# Patient Record
Sex: FEMALE | Race: WHITE | NOT HISPANIC OR LATINO
[De-identification: names, ages, dates, MRNs, and addresses within clinical notes are randomized per-mention and may not be internally consistent; named-entity substitution may affect disease eponyms.]

---

## 2017-07-07 PROBLEM — Z00.00 ENCOUNTER FOR PREVENTIVE HEALTH EXAMINATION: Status: ACTIVE | Noted: 2017-07-07

## 2017-07-10 ENCOUNTER — RX RENEWAL (OUTPATIENT)
Age: 67
End: 2017-07-10

## 2017-10-16 ENCOUNTER — RX RENEWAL (OUTPATIENT)
Age: 67
End: 2017-10-16

## 2017-10-18 ENCOUNTER — RX RENEWAL (OUTPATIENT)
Age: 67
End: 2017-10-18

## 2017-12-04 ENCOUNTER — APPOINTMENT (OUTPATIENT)
Dept: GASTROENTEROLOGY | Facility: CLINIC | Age: 67
End: 2017-12-04
Payer: MEDICARE

## 2017-12-04 VITALS
WEIGHT: 266 LBS | SYSTOLIC BLOOD PRESSURE: 110 MMHG | BODY MASS INDEX: 40.32 KG/M2 | DIASTOLIC BLOOD PRESSURE: 70 MMHG | HEIGHT: 68 IN | TEMPERATURE: 98.2 F

## 2017-12-04 DIAGNOSIS — Z86.79 PERSONAL HISTORY OF OTHER DISEASES OF THE CIRCULATORY SYSTEM: ICD-10-CM

## 2017-12-04 DIAGNOSIS — Z80.8 FAMILY HISTORY OF MALIGNANT NEOPLASM OF OTHER ORGANS OR SYSTEMS: ICD-10-CM

## 2017-12-04 DIAGNOSIS — Z82.49 FAMILY HISTORY OF ISCHEMIC HEART DISEASE AND OTHER DISEASES OF THE CIRCULATORY SYSTEM: ICD-10-CM

## 2017-12-04 DIAGNOSIS — Z80.0 FAMILY HISTORY OF MALIGNANT NEOPLASM OF DIGESTIVE ORGANS: ICD-10-CM

## 2017-12-04 DIAGNOSIS — Z86.39 PERSONAL HISTORY OF OTHER ENDOCRINE, NUTRITIONAL AND METABOLIC DISEASE: ICD-10-CM

## 2017-12-04 DIAGNOSIS — Z83.3 FAMILY HISTORY OF DIABETES MELLITUS: ICD-10-CM

## 2017-12-04 DIAGNOSIS — Z83.511 FAMILY HISTORY OF GLAUCOMA: ICD-10-CM

## 2017-12-04 DIAGNOSIS — Z87.891 PERSONAL HISTORY OF NICOTINE DEPENDENCE: ICD-10-CM

## 2017-12-04 DIAGNOSIS — R10.32 LEFT LOWER QUADRANT PAIN: ICD-10-CM

## 2017-12-04 PROCEDURE — 99214 OFFICE O/P EST MOD 30 MIN: CPT

## 2017-12-04 RX ORDER — DEXLANSOPRAZOLE 60 MG/1
60 CAPSULE, DELAYED RELEASE ORAL
Qty: 90 | Refills: 1 | Status: ACTIVE | COMMUNITY
Start: 2017-06-16

## 2017-12-04 RX ORDER — VIT A/VIT C/VIT E/ZINC/COPPER 4296-226
CAPSULE ORAL
Refills: 0 | Status: ACTIVE | COMMUNITY

## 2017-12-04 RX ORDER — LISINOPRIL 30 MG/1
30 TABLET ORAL
Qty: 90 | Refills: 0 | Status: ACTIVE | COMMUNITY
Start: 2017-08-14

## 2017-12-04 RX ORDER — DICLOFENAC POTASSIUM 50 MG/1
50 TABLET, COATED ORAL
Qty: 63 | Refills: 0 | Status: DISCONTINUED | COMMUNITY
Start: 2017-11-02 | End: 2017-12-04

## 2017-12-04 RX ORDER — TOLTERODINE TARTRATE 4 MG/1
4 CAPSULE, EXTENDED RELEASE ORAL
Qty: 90 | Refills: 0 | Status: ACTIVE | COMMUNITY
Start: 2017-07-18

## 2017-12-04 RX ORDER — HYDROCHLOROTHIAZIDE 12.5 MG/1
12.5 CAPSULE ORAL
Qty: 90 | Refills: 0 | Status: ACTIVE | COMMUNITY
Start: 2017-08-13

## 2017-12-04 RX ORDER — MULTIVIT-MIN/IRON/FOLIC ACID/K 18-600-40
CAPSULE ORAL
Refills: 0 | Status: ACTIVE | COMMUNITY

## 2017-12-04 RX ORDER — CALCIUM CARBONATE-CHOLECALCIFEROL TAB 250 MG-125 UNIT 250-125 MG-UNIT
TAB ORAL
Refills: 0 | Status: ACTIVE | COMMUNITY

## 2017-12-04 RX ORDER — CHLORDIAZEPOXIDE HYDROCHLORIDE AND CLIDINIUM BROMIDE 5; 2.5 MG/1; MG/1
5-2.5 CAPSULE, GELATIN COATED ORAL
Qty: 270 | Refills: 0 | Status: DISCONTINUED | COMMUNITY
Start: 2017-06-16 | End: 2017-12-04

## 2017-12-04 RX ORDER — PSYLLIUM HUSK 0.4 G
CAPSULE ORAL
Refills: 0 | Status: ACTIVE | COMMUNITY

## 2017-12-04 RX ORDER — LYSINE 600 MG
TABLET ORAL
Refills: 0 | Status: ACTIVE | COMMUNITY

## 2017-12-04 RX ORDER — TAPENTADOL HYDROCHLORIDE 75 MG/1
75 TABLET, FILM COATED ORAL
Qty: 90 | Refills: 0 | Status: DISCONTINUED | COMMUNITY
Start: 2017-09-21 | End: 2017-12-04

## 2017-12-06 ENCOUNTER — TRANSCRIPTION ENCOUNTER (OUTPATIENT)
Age: 67
End: 2017-12-06

## 2017-12-07 ENCOUNTER — MESSAGE (OUTPATIENT)
Age: 67
End: 2017-12-07

## 2017-12-10 ENCOUNTER — FORM ENCOUNTER (OUTPATIENT)
Age: 67
End: 2017-12-10

## 2017-12-11 ENCOUNTER — OUTPATIENT (OUTPATIENT)
Dept: OUTPATIENT SERVICES | Facility: HOSPITAL | Age: 67
LOS: 1 days | End: 2017-12-11
Payer: MEDICARE

## 2017-12-11 ENCOUNTER — APPOINTMENT (OUTPATIENT)
Dept: CT IMAGING | Facility: IMAGING CENTER | Age: 67
End: 2017-12-11
Payer: MEDICARE

## 2017-12-11 DIAGNOSIS — R10.32 LEFT LOWER QUADRANT PAIN: ICD-10-CM

## 2017-12-11 PROCEDURE — 74176 CT ABD & PELVIS W/O CONTRAST: CPT | Mod: 26

## 2017-12-11 PROCEDURE — 74176 CT ABD & PELVIS W/O CONTRAST: CPT

## 2018-01-15 ENCOUNTER — RX RENEWAL (OUTPATIENT)
Age: 68
End: 2018-01-15

## 2018-03-05 ENCOUNTER — APPOINTMENT (OUTPATIENT)
Dept: VASCULAR SURGERY | Facility: CLINIC | Age: 68
End: 2018-03-05
Payer: MEDICARE

## 2018-03-05 VITALS
HEART RATE: 82 BPM | BODY MASS INDEX: 40.16 KG/M2 | SYSTOLIC BLOOD PRESSURE: 136 MMHG | WEIGHT: 265 LBS | HEIGHT: 68 IN | TEMPERATURE: 98.7 F | DIASTOLIC BLOOD PRESSURE: 82 MMHG

## 2018-03-05 DIAGNOSIS — M79.604 PAIN IN RIGHT LEG: ICD-10-CM

## 2018-03-05 DIAGNOSIS — M54.5 LOW BACK PAIN: ICD-10-CM

## 2018-03-05 DIAGNOSIS — G89.29 LOW BACK PAIN: ICD-10-CM

## 2018-03-05 DIAGNOSIS — M79.605 PAIN IN RIGHT LEG: ICD-10-CM

## 2018-03-05 PROCEDURE — 93971 EXTREMITY STUDY: CPT

## 2018-03-05 PROCEDURE — 99204 OFFICE O/P NEW MOD 45 MIN: CPT

## 2018-03-06 PROBLEM — M54.5 CHRONIC BILATERAL LOW BACK PAIN, WITH SCIATICA PRESENCE UNSPECIFIED: Status: ACTIVE | Noted: 2018-03-06

## 2018-03-06 PROBLEM — M79.604 PAIN IN BOTH LOWER EXTREMITIES: Status: ACTIVE | Noted: 2018-03-06

## 2018-03-06 RX ORDER — GABAPENTIN 600 MG/1
600 TABLET, COATED ORAL
Qty: 90 | Refills: 0 | Status: ACTIVE | COMMUNITY
Start: 2017-12-28

## 2018-06-19 ENCOUNTER — APPOINTMENT (OUTPATIENT)
Dept: VASCULAR SURGERY | Facility: CLINIC | Age: 68
End: 2018-06-19
Payer: MEDICARE

## 2018-06-19 VITALS
DIASTOLIC BLOOD PRESSURE: 80 MMHG | BODY MASS INDEX: 40.16 KG/M2 | SYSTOLIC BLOOD PRESSURE: 153 MMHG | HEART RATE: 89 BPM | WEIGHT: 265 LBS | HEIGHT: 68 IN | TEMPERATURE: 98.7 F

## 2018-06-19 DIAGNOSIS — G57.91 UNSPECIFIED MONONEUROPATHY OF RIGHT LOWER LIMB: ICD-10-CM

## 2018-06-19 DIAGNOSIS — G57.92 UNSPECIFIED MONONEUROPATHY OF LEFT LOWER LIMB: ICD-10-CM

## 2018-06-19 PROCEDURE — 99214 OFFICE O/P EST MOD 30 MIN: CPT

## 2018-07-28 PROBLEM — Z80.8 FAMILY HISTORY OF SKIN CANCER: Status: ACTIVE | Noted: 2017-12-04

## 2018-07-28 PROBLEM — Z80.0 FAMILY HISTORY OF COLON CANCER: Status: ACTIVE | Noted: 2017-12-04

## 2018-08-05 ENCOUNTER — RX RENEWAL (OUTPATIENT)
Age: 68
End: 2018-08-05

## 2019-01-08 ENCOUNTER — APPOINTMENT (OUTPATIENT)
Dept: VASCULAR SURGERY | Facility: CLINIC | Age: 69
End: 2019-01-08
Payer: MEDICARE

## 2019-01-08 VITALS
BODY MASS INDEX: 40.16 KG/M2 | HEIGHT: 68 IN | DIASTOLIC BLOOD PRESSURE: 77 MMHG | HEART RATE: 85 BPM | SYSTOLIC BLOOD PRESSURE: 137 MMHG | TEMPERATURE: 98.1 F | WEIGHT: 265 LBS

## 2019-01-08 DIAGNOSIS — M54.30 SCIATICA, UNSPECIFIED SIDE: ICD-10-CM

## 2019-01-08 PROCEDURE — 99214 OFFICE O/P EST MOD 30 MIN: CPT

## 2019-01-08 NOTE — ASSESSMENT
[Arterial/Venous Disease] : arterial/venous disease [Other: _____] : [unfilled] [Foot care/Footwear] : foot care/footwear [FreeTextEntry1] : Impression le c/o from combo of neurogenic and venous insuff the neurogenic component if the majority contributor\par \par [facility] management   leg elevation, knee high comp stockings 20-30mm Hg\par ov 6-8 mo to re eval \par \par letter faxed to Dr JAEL Ryder DPM \par \par Varicose veins are enlarged, twisted veins. Varicose veins are caused by increased blood pressure in the veins.  The blood moves towards the heart by 1-way valves in the veins. When the valves become weakened or damaged, blood can collect in the veins and pool in your lower legs (ankles). This causes the veins to become enlarged and incompetent with reflux. Sitting or standing for long periods can cause blood to pool in the leg veins, increasing the pressure within the veins. \par Risk factors for varicose veins or venous disease may include:  obesity, older age, standing or sitting for prolonged periods of time for several years, being female, pregnancy, taking oral contraceptive pills or hormone replacement, being inactive, and/or smoking. \par The most common symptoms of varicose veins are sensations in the legs, such as a heavy feeling, burning, and/or aching. However, each individual may experience symptoms differently.  Other symptoms may include:  color changes in the skin, sores on the legs, or rash.  Severe varicose veins or venous disease may eventually produce long-term mild swelling that can result in more serious skin and tissue problems, such as ulcers and non-healing sores.\par Varicose veins and venous disease are diagnosed by a complete medical history, physical examination, and diagnostic studies for varicose veins including duplex ultrasound and color-flow imaging.  \par Medical treatment for varicose veins and venous disease include:  compression stockings, sclerotherapy, endovenous ablation and/or surgical treatment with microphlebectomy.  \par \par

## 2019-01-08 NOTE — DATA REVIEWED
[FreeTextEntry1] : 3/5/2018 venous Doppler  LLE no acute dvt svt,  LLE sig GSV insuff  in prox calf only

## 2019-01-08 NOTE — HISTORY OF PRESENT ILLNESS
[FreeTextEntry1] : pt c/o ongoing remi le  jesus left leg pain from lower back radiation  to the calf \par overall unchanged\par pt is compliant w comp stockings \par pt c/o mild le swelling

## 2019-01-08 NOTE — PHYSICAL EXAM
[Normal Breath Sounds] : Normal breath sounds [1+] : left 1+ [2+] : left 2+ [Ankle Swelling (On Exam)] : present [Ankle Swelling Bilaterally] : bilaterally  [Varicose Veins Of Lower Extremities] : bilaterally [] : bilaterally [Ankle Swelling On The Right] : mild [No HSM] : no hepatosplenomegaly [No Rash or Lesion] : No rash or lesion [Alert] : alert [Oriented to Person] : oriented to person [Oriented to Place] : oriented to place [Oriented to Time] : oriented to time [Calm] : calm [JVD] : no jugular venous distention  [Abdomen Masses] : No abdominal masses [Tender] : was nontender [Stool Sample Taken] : No stool obtained  on rectal exam [de-identified] : nad [de-identified] : wnl [FreeTextEntry1] : mild venous insuff  w mild st dermatitis [de-identified] : wnl [de-identified] : wnl [de-identified] : cn 2-12 remi grossly intact [de-identified] : cooperative

## 2019-03-04 ENCOUNTER — RX RENEWAL (OUTPATIENT)
Age: 69
End: 2019-03-04

## 2019-03-04 RX ORDER — LUBIPROSTONE 8 UG/1
8 CAPSULE, GELATIN COATED ORAL
Qty: 180 | Refills: 0 | Status: ACTIVE | COMMUNITY
Start: 2017-12-04 | End: 1900-01-01

## 2019-07-12 ENCOUNTER — RX RENEWAL (OUTPATIENT)
Age: 69
End: 2019-07-12

## 2019-07-12 RX ORDER — RANITIDINE 150 MG/1
150 TABLET ORAL DAILY
Qty: 90 | Refills: 0 | Status: ACTIVE | COMMUNITY
Start: 2017-10-18 | End: 1900-01-01

## 2019-08-05 PROBLEM — R10.32 LEFT LOWER QUADRANT PAIN: Status: ACTIVE | Noted: 2017-12-04

## 2019-09-10 ENCOUNTER — APPOINTMENT (OUTPATIENT)
Dept: VASCULAR SURGERY | Facility: CLINIC | Age: 69
End: 2019-09-10
Payer: MEDICARE

## 2019-09-10 VITALS
WEIGHT: 260 LBS | TEMPERATURE: 98.1 F | HEIGHT: 68 IN | DIASTOLIC BLOOD PRESSURE: 75 MMHG | SYSTOLIC BLOOD PRESSURE: 128 MMHG | BODY MASS INDEX: 39.4 KG/M2 | HEART RATE: 80 BPM

## 2019-09-10 DIAGNOSIS — I83.899 VARICOSE VEINS OF UNSPECIFIED LOWER EXTREMITY WITH OTHER COMPLICATIONS: ICD-10-CM

## 2019-09-10 PROCEDURE — 99214 OFFICE O/P EST MOD 30 MIN: CPT

## 2019-09-10 NOTE — HISTORY OF PRESENT ILLNESS
[FreeTextEntry1] : pt c/o ongoing remi le  jesus left leg pain from lower back radiation  to the calf \par overall unchanged\par pt is compliant w comp stockings \par pt c/o mild le swelling  [de-identified] : pt is compliant w comp stockings\par pt states less swelling and discomfort since last ov when she wears the comp stockings\par intensity mild \par

## 2019-09-10 NOTE — PHYSICAL EXAM
[Normal Breath Sounds] : Normal breath sounds [1+] : left 1+ [Ankle Swelling (On Exam)] : present [2+] : left 2+ [Ankle Swelling Bilaterally] : bilaterally  [Varicose Veins Of Lower Extremities] : bilaterally [] : bilaterally [Ankle Swelling On The Right] : mild [No HSM] : no hepatosplenomegaly [No Rash or Lesion] : No rash or lesion [Alert] : alert [Oriented to Person] : oriented to person [Oriented to Place] : oriented to place [Oriented to Time] : oriented to time [Calm] : calm [JVD] : no jugular venous distention  [Abdomen Masses] : No abdominal masses [Tender] : was nontender [Stool Sample Taken] : No stool obtained  on rectal exam [de-identified] : nad [de-identified] : wnl [FreeTextEntry1] : mild venous insuff  w mild st dermatitis\par mild to mod edema\par remi le multiple small v veins and spider v  remi ant thigh , calf and shins  [de-identified] : wnl [de-identified] : wnl [de-identified] : cn 2-12 remi grossly intact [de-identified] : cooperative

## 2019-09-10 NOTE — ASSESSMENT
[Arterial/Venous Disease] : arterial/venous disease [Other: _____] : [unfilled] [Foot care/Footwear] : foot care/footwear [FreeTextEntry1] : Impression le c/o from combo of neurogenic and venous insuff the neurogenic component if the majority contributor, venous insuff component clinically improving \par \par [facility] management   leg elevation, knee high comp stockings 20-30mm Hg, wt loss, diet control \par ov 7 mo to re eval  april 2020 \par rto if any changes \par \par letter faxed to Dr JAEL Ryder DPM \par \par Varicose veins are enlarged, twisted veins. Varicose veins are caused by increased blood pressure in the veins.  The blood moves towards the heart by 1-way valves in the veins. When the valves become weakened or damaged, blood can collect in the veins and pool in your lower legs (ankles). This causes the veins to become enlarged and incompetent with reflux. Sitting or standing for long periods can cause blood to pool in the leg veins, increasing the pressure within the veins. \par Risk factors for varicose veins or venous disease may include:  obesity, older age, standing or sitting for prolonged periods of time for several years, being female, pregnancy, taking oral contraceptive pills or hormone replacement, being inactive, and/or smoking. \par The most common symptoms of varicose veins are sensations in the legs, such as a heavy feeling, burning, and/or aching. However, each individual may experience symptoms differently.  Other symptoms may include:  color changes in the skin, sores on the legs, or rash.  Severe varicose veins or venous disease may eventually produce long-term mild swelling that can result in more serious skin and tissue problems, such as ulcers and non-healing sores.\par Varicose veins and venous disease are diagnosed by a complete medical history, physical examination, and diagnostic studies for varicose veins including duplex ultrasound and color-flow imaging.  \par Medical treatment for varicose veins and venous disease include:  compression stockings, sclerotherapy, endovenous ablation and/or surgical treatment with microphlebectomy.  \par \par

## 2019-10-07 ENCOUNTER — RX RENEWAL (OUTPATIENT)
Age: 69
End: 2019-10-07

## 2019-10-10 ENCOUNTER — APPOINTMENT (OUTPATIENT)
Dept: GASTROENTEROLOGY | Facility: CLINIC | Age: 69
End: 2019-10-10
Payer: MEDICARE

## 2019-10-10 VITALS
BODY MASS INDEX: 40.32 KG/M2 | TEMPERATURE: 98.3 F | DIASTOLIC BLOOD PRESSURE: 70 MMHG | WEIGHT: 266 LBS | HEIGHT: 68 IN | OXYGEN SATURATION: 97 % | SYSTOLIC BLOOD PRESSURE: 140 MMHG | HEART RATE: 83 BPM

## 2019-10-10 DIAGNOSIS — K31.84 GASTROPARESIS: ICD-10-CM

## 2019-10-10 DIAGNOSIS — K59.00 CONSTIPATION, UNSPECIFIED: ICD-10-CM

## 2019-10-10 DIAGNOSIS — K21.9 GASTRO-ESOPHAGEAL REFLUX DISEASE W/OUT ESOPHAGITIS: ICD-10-CM

## 2019-10-10 PROCEDURE — 99214 OFFICE O/P EST MOD 30 MIN: CPT

## 2019-10-10 RX ORDER — OSELTAMIVIR PHOSPHATE 75 MG/1
75 CAPSULE ORAL
Qty: 10 | Refills: 0 | Status: DISCONTINUED | COMMUNITY
Start: 2018-01-23 | End: 2019-10-10

## 2019-10-10 RX ORDER — LUBIPROSTONE 8 UG/1
8 CAPSULE, GELATIN COATED ORAL DAILY
Refills: 0 | Status: DISCONTINUED | COMMUNITY
End: 2019-10-10

## 2019-10-10 RX ORDER — PRAVASTATIN SODIUM 10 MG/1
10 TABLET ORAL
Qty: 90 | Refills: 0 | Status: DISCONTINUED | COMMUNITY
Start: 2017-07-07 | End: 2019-10-10

## 2019-10-10 RX ORDER — PRAVASTATIN SODIUM 20 MG/1
20 TABLET ORAL
Qty: 90 | Refills: 0 | Status: DISCONTINUED | COMMUNITY
Start: 2018-03-23 | End: 2019-10-10

## 2019-10-10 RX ORDER — METHYLSULFONYLMETHANE 900 MG
CAPSULE ORAL
Refills: 0 | Status: DISCONTINUED | COMMUNITY
End: 2019-10-10

## 2019-10-10 RX ORDER — GABAPENTIN 600 MG/1
600 TABLET, COATED ORAL
Qty: 90 | Refills: 0 | Status: DISCONTINUED | COMMUNITY
Start: 2017-12-28 | End: 2019-10-10

## 2019-10-10 RX ORDER — RANITIDINE HYDROCHLORIDE 150 MG/1
150 CAPSULE ORAL
Qty: 90 | Refills: 0 | Status: DISCONTINUED | COMMUNITY
Start: 2017-07-10 | End: 2019-10-10

## 2019-10-10 RX ORDER — CHLORDIAZEPOXIDE HYDROCHLORIDE AND CLIDINIUM BROMIDE 5; 2.5 MG/1; MG/1
5-2.5 CAPSULE, GELATIN COATED ORAL DAILY
Refills: 0 | Status: DISCONTINUED | COMMUNITY
End: 2019-10-10

## 2019-10-10 RX ORDER — AZITHROMYCIN 250 MG/1
250 TABLET, FILM COATED ORAL
Qty: 6 | Refills: 0 | Status: DISCONTINUED | COMMUNITY
Start: 2018-03-26 | End: 2019-10-10

## 2019-10-10 RX ORDER — GABAPENTIN 300 MG/1
300 CAPSULE ORAL
Qty: 90 | Refills: 0 | Status: DISCONTINUED | COMMUNITY
Start: 2017-11-30 | End: 2019-10-10

## 2019-10-10 RX ORDER — PRAVASTATIN SODIUM 40 MG/1
40 TABLET ORAL
Refills: 0 | Status: ACTIVE | COMMUNITY

## 2019-10-11 PROBLEM — K59.00 CONSTIPATION: Status: ACTIVE | Noted: 2017-12-04

## 2019-10-11 PROBLEM — K31.84 GASTROPARESIS: Status: ACTIVE | Noted: 2017-12-04

## 2019-10-11 PROBLEM — K21.9 GASTROESOPHAGEAL REFLUX DISEASE WITHOUT ESOPHAGITIS: Status: ACTIVE | Noted: 2017-07-10

## 2019-10-12 NOTE — ASSESSMENT
[FreeTextEntry1] : Patient with chronic constipation, reflux, and gastroparesis.  Her constipation is controlled with Amitiza 8 mcg once a day.  Her reflux is largely controlled with Dexilant 60 mg a day.  The H2 blocker in the evening did not seem to provide any additional benefit in fact she feels better since stopping it.\par \par Patient was advised to continue Amitiza and Dexilant.  She was advised to stop the H2 blocker as it is not helped her.\par \par \par Plan from 12/4/2017 - Patient with chronic constipation, gastroparesis, and reflux. She is doing relatively well on Amitiza 8 mcg q.d., Dexilant 60 mg q.d., and ranitidine 150 mg q.h.s. She does get intermittent left lower quadrant pain related to her known IBS.\par \par Patient was sent for a CT scan of the abdomen and pelvis to rule out any other etiology of her left lower quadrant pain including anything related to the postoperative seroma that had been previously seen.\par \par Patient was advised to continue her current medications. Additionally, the patient was counseled regarding the importance of weight loss.\par \par Patient will discuss with Dr. Ames about stopping Librax as a standing order and using it p.r.n.\par \par

## 2019-10-12 NOTE — PHYSICAL EXAM
[General Appearance - In No Acute Distress] : in no acute distress [General Appearance - Alert] : alert [Neck Appearance] : the appearance of the neck was normal [Neck Cervical Mass (___cm)] : no neck mass was observed [Jugular Venous Distention Increased] : there was no jugular-venous distention [Thyroid Nodule] : there were no palpable thyroid nodules [Thyroid Diffuse Enlargement] : the thyroid was not enlarged [Auscultation Breath Sounds / Voice Sounds] : lungs were clear to auscultation bilaterally [Heart Rate And Rhythm] : heart rate was normal and rhythm regular [Heart Sounds] : normal S1 and S2 [Heart Sounds Gallop] : no gallops [Murmurs] : no murmurs [Heart Sounds Pericardial Friction Rub] : no pericardial rub [Edema] : there was no peripheral edema [Bowel Sounds] : normal bowel sounds [Abdomen Soft] : soft [] : no hepato-splenomegaly [Abdomen Mass (___ Cm)] : no abdominal mass palpated [No CVA Tenderness] : no ~M costovertebral angle tenderness [No Spinal Tenderness] : no spinal tenderness [Oriented To Time, Place, And Person] : oriented to person, place, and time [Impaired Insight] : insight and judgment were intact [Affect] : the affect was normal [FreeTextEntry1] : obese, mild LLQ tenderness, no rebound/guarding

## 2019-10-12 NOTE — HISTORY OF PRESENT ILLNESS
[FreeTextEntry1] : The patient has chronic constipation for which she takes Amitiza 8 mcg once a day.  On the medication, she goes every other day.  She denies melena or bright red blood per rectum.  She has chronic reflux for which she takes Dexilant 60 mg in the morning and ranitidine 150 mg at bedtime which she stopped a couple of weeks ago due to the recent problems with ranitidine.  The patient had been having burning in the morning with nausea and vomiting although this stopped over the past 2 weeks since the patient has been off of ranitidine.  She denies any other heartburn or abdominal pain.  She denies dysphasia.  The patient's weight is stable.  Patient has a history of gastroparesis.  She undergoes colonoscopies by Dr. Ames.  She informs me that she has an abnormal stress test and catheterization is being considered although she is allergic to dye.  She has not been hospitalized in the past year.\par \par \par Note from 12/4/2017 - The patient has chronic constipation, gastroparesis, and reflux. She takes Amitiza 8 mcg once a day as we have found that this is the most effective dose. She is also on Dexilant in the morning and ranitidine 150 mg at bedtime. Previous CT scan has shown a postoperative seroma along the left pelvic sidewall related to her prior hysterectomy.\par \par The patient had vomiting over the summer but adjusted her diet with smaller more frequent meals. She now feels better. She gets intermittent left lower quadrant pain which occurs every couple of weeks and last one to 2 days. She has one solid bowel movement every other day which is sometimes soft. She denies melena or bright red blood per rectum. She gets occasional heartburn on the combination of Dexilant and Zantac. This occurs about one to 2 times a month. She denies vomiting or dysphagia. Her weight is stable.\par \par The patient undergoes colonoscopies by Dr. Ames.  The patient has not been hospitalized in the past year and denies any cardiac issues.

## 2019-10-12 NOTE — CONSULT LETTER
[FreeTextEntry1] : Dear Dr. Adam Salgado,\par \par I had the pleasure of seeing your patient TI RYAN in the office today.  My office note is attached.\par \par Thank you very much for allowing me to participate in the care of your patient.\par \par Sincerely,\par \par Alphonso Garcia M.D., FAC, FACP\par Director, Celiac Program at [facility]\par  of Medicine\par [employer] at [facility] Practice Director,\par [facility] - Gastroenterology/Internal Medicine at [place]\par [address]\[place] [address]\par Tel: [phone]\par Email: [email or web address] \par \par The attached note has been created using a voice recognition system (Dragon).  There may be some misspellings and typos.  Please call my office if you have any issues or questions. \par

## 2019-12-21 ENCOUNTER — INPATIENT (INPATIENT)
Facility: HOSPITAL | Age: 69
LOS: 2 days | Discharge: NURSING, REHAB OR LONG-TERM CARE | End: 2019-12-24
Attending: HOSPITALIST | Admitting: HOSPITALIST
Payer: MEDICARE

## 2019-12-21 VITALS
SYSTOLIC BLOOD PRESSURE: 138 MMHG | DIASTOLIC BLOOD PRESSURE: 69 MMHG | TEMPERATURE: 98 F | RESPIRATION RATE: 18 BRPM | OXYGEN SATURATION: 100 % | HEART RATE: 85 BPM

## 2019-12-21 DIAGNOSIS — Z02.9 ENCOUNTER FOR ADMINISTRATIVE EXAMINATIONS, UNSPECIFIED: ICD-10-CM

## 2019-12-21 DIAGNOSIS — Z90.49 ACQUIRED ABSENCE OF OTHER SPECIFIED PARTS OF DIGESTIVE TRACT: Chronic | ICD-10-CM

## 2019-12-21 DIAGNOSIS — Z29.9 ENCOUNTER FOR PROPHYLACTIC MEASURES, UNSPECIFIED: ICD-10-CM

## 2019-12-21 DIAGNOSIS — Z98.890 OTHER SPECIFIED POSTPROCEDURAL STATES: Chronic | ICD-10-CM

## 2019-12-21 DIAGNOSIS — E78.5 HYPERLIPIDEMIA, UNSPECIFIED: ICD-10-CM

## 2019-12-21 DIAGNOSIS — Z98.891 HISTORY OF UTERINE SCAR FROM PREVIOUS SURGERY: Chronic | ICD-10-CM

## 2019-12-21 DIAGNOSIS — I25.10 ATHEROSCLEROTIC HEART DISEASE OF NATIVE CORONARY ARTERY WITHOUT ANGINA PECTORIS: ICD-10-CM

## 2019-12-21 DIAGNOSIS — E11.42 TYPE 2 DIABETES MELLITUS WITH DIABETIC POLYNEUROPATHY: ICD-10-CM

## 2019-12-21 DIAGNOSIS — I10 ESSENTIAL (PRIMARY) HYPERTENSION: ICD-10-CM

## 2019-12-21 DIAGNOSIS — R32 UNSPECIFIED URINARY INCONTINENCE: ICD-10-CM

## 2019-12-21 DIAGNOSIS — R26.2 DIFFICULTY IN WALKING, NOT ELSEWHERE CLASSIFIED: ICD-10-CM

## 2019-12-21 DIAGNOSIS — M25.562 PAIN IN LEFT KNEE: ICD-10-CM

## 2019-12-21 LAB
ALBUMIN SERPL ELPH-MCNC: 4.1 G/DL — SIGNIFICANT CHANGE UP (ref 3.3–5)
ALP SERPL-CCNC: 55 U/L — SIGNIFICANT CHANGE UP (ref 40–120)
ALT FLD-CCNC: 18 U/L — SIGNIFICANT CHANGE UP (ref 4–33)
ANION GAP SERPL CALC-SCNC: 15 MMO/L — HIGH (ref 7–14)
AST SERPL-CCNC: 16 U/L — SIGNIFICANT CHANGE UP (ref 4–32)
BASOPHILS # BLD AUTO: 0.04 K/UL — SIGNIFICANT CHANGE UP (ref 0–0.2)
BASOPHILS NFR BLD AUTO: 0.5 % — SIGNIFICANT CHANGE UP (ref 0–2)
BILIRUB SERPL-MCNC: 0.3 MG/DL — SIGNIFICANT CHANGE UP (ref 0.2–1.2)
BUN SERPL-MCNC: 23 MG/DL — SIGNIFICANT CHANGE UP (ref 7–23)
CALCIUM SERPL-MCNC: 9.7 MG/DL — SIGNIFICANT CHANGE UP (ref 8.4–10.5)
CHLORIDE SERPL-SCNC: 102 MMOL/L — SIGNIFICANT CHANGE UP (ref 98–107)
CO2 SERPL-SCNC: 24 MMOL/L — SIGNIFICANT CHANGE UP (ref 22–31)
CREAT SERPL-MCNC: 0.91 MG/DL — SIGNIFICANT CHANGE UP (ref 0.5–1.3)
EOSINOPHIL # BLD AUTO: 0.15 K/UL — SIGNIFICANT CHANGE UP (ref 0–0.5)
EOSINOPHIL NFR BLD AUTO: 1.7 % — SIGNIFICANT CHANGE UP (ref 0–6)
GLUCOSE SERPL-MCNC: 169 MG/DL — HIGH (ref 70–99)
HBA1C BLD-MCNC: 9 % — HIGH (ref 4–5.6)
HCT VFR BLD CALC: 39.3 % — SIGNIFICANT CHANGE UP (ref 34.5–45)
HGB BLD-MCNC: 12.5 G/DL — SIGNIFICANT CHANGE UP (ref 11.5–15.5)
IMM GRANULOCYTES NFR BLD AUTO: 0.2 % — SIGNIFICANT CHANGE UP (ref 0–1.5)
LYMPHOCYTES # BLD AUTO: 2.27 K/UL — SIGNIFICANT CHANGE UP (ref 1–3.3)
LYMPHOCYTES # BLD AUTO: 25.7 % — SIGNIFICANT CHANGE UP (ref 13–44)
MCHC RBC-ENTMCNC: 30.8 PG — SIGNIFICANT CHANGE UP (ref 27–34)
MCHC RBC-ENTMCNC: 31.8 % — LOW (ref 32–36)
MCV RBC AUTO: 96.8 FL — SIGNIFICANT CHANGE UP (ref 80–100)
MONOCYTES # BLD AUTO: 0.58 K/UL — SIGNIFICANT CHANGE UP (ref 0–0.9)
MONOCYTES NFR BLD AUTO: 6.6 % — SIGNIFICANT CHANGE UP (ref 2–14)
NEUTROPHILS # BLD AUTO: 5.77 K/UL — SIGNIFICANT CHANGE UP (ref 1.8–7.4)
NEUTROPHILS NFR BLD AUTO: 65.3 % — SIGNIFICANT CHANGE UP (ref 43–77)
NRBC # FLD: 0 K/UL — SIGNIFICANT CHANGE UP (ref 0–0)
PLATELET # BLD AUTO: 296 K/UL — SIGNIFICANT CHANGE UP (ref 150–400)
PMV BLD: 10.1 FL — SIGNIFICANT CHANGE UP (ref 7–13)
POTASSIUM SERPL-MCNC: 4.3 MMOL/L — SIGNIFICANT CHANGE UP (ref 3.5–5.3)
POTASSIUM SERPL-SCNC: 4.3 MMOL/L — SIGNIFICANT CHANGE UP (ref 3.5–5.3)
PROT SERPL-MCNC: 6.9 G/DL — SIGNIFICANT CHANGE UP (ref 6–8.3)
RBC # BLD: 4.06 M/UL — SIGNIFICANT CHANGE UP (ref 3.8–5.2)
RBC # FLD: 12.7 % — SIGNIFICANT CHANGE UP (ref 10.3–14.5)
SODIUM SERPL-SCNC: 141 MMOL/L — SIGNIFICANT CHANGE UP (ref 135–145)
URATE SERPL-MCNC: 6.4 MG/DL — SIGNIFICANT CHANGE UP (ref 2.5–7)
WBC # BLD: 8.83 K/UL — SIGNIFICANT CHANGE UP (ref 3.8–10.5)
WBC # FLD AUTO: 8.83 K/UL — SIGNIFICANT CHANGE UP (ref 3.8–10.5)

## 2019-12-21 PROCEDURE — 73502 X-RAY EXAM HIP UNI 2-3 VIEWS: CPT | Mod: 26,LT

## 2019-12-21 PROCEDURE — 99223 1ST HOSP IP/OBS HIGH 75: CPT

## 2019-12-21 PROCEDURE — 93971 EXTREMITY STUDY: CPT | Mod: 26,LT

## 2019-12-21 PROCEDURE — 73562 X-RAY EXAM OF KNEE 3: CPT | Mod: 26,LT

## 2019-12-21 RX ORDER — DEXTROSE 50 % IN WATER 50 %
25 SYRINGE (ML) INTRAVENOUS ONCE
Refills: 0 | Status: DISCONTINUED | OUTPATIENT
Start: 2019-12-21 | End: 2019-12-24

## 2019-12-21 RX ORDER — INSULIN LISPRO 100/ML
VIAL (ML) SUBCUTANEOUS
Refills: 0 | Status: DISCONTINUED | OUTPATIENT
Start: 2019-12-21 | End: 2019-12-24

## 2019-12-21 RX ORDER — LIDOCAINE 4 G/100G
1 CREAM TOPICAL
Refills: 0 | Status: DISCONTINUED | OUTPATIENT
Start: 2019-12-21 | End: 2019-12-24

## 2019-12-21 RX ORDER — ASPIRIN/CALCIUM CARB/MAGNESIUM 324 MG
81 TABLET ORAL DAILY
Refills: 0 | Status: DISCONTINUED | OUTPATIENT
Start: 2019-12-21 | End: 2019-12-24

## 2019-12-21 RX ORDER — ATORVASTATIN CALCIUM 80 MG/1
10 TABLET, FILM COATED ORAL AT BEDTIME
Refills: 0 | Status: DISCONTINUED | OUTPATIENT
Start: 2019-12-21 | End: 2019-12-24

## 2019-12-21 RX ORDER — DEXTROSE 50 % IN WATER 50 %
12.5 SYRINGE (ML) INTRAVENOUS ONCE
Refills: 0 | Status: DISCONTINUED | OUTPATIENT
Start: 2019-12-21 | End: 2019-12-24

## 2019-12-21 RX ORDER — INSULIN LISPRO 100/ML
VIAL (ML) SUBCUTANEOUS AT BEDTIME
Refills: 0 | Status: DISCONTINUED | OUTPATIENT
Start: 2019-12-21 | End: 2019-12-24

## 2019-12-21 RX ORDER — SODIUM CHLORIDE 9 MG/ML
1000 INJECTION, SOLUTION INTRAVENOUS
Refills: 0 | Status: DISCONTINUED | OUTPATIENT
Start: 2019-12-21 | End: 2019-12-24

## 2019-12-21 RX ORDER — OXYBUTYNIN CHLORIDE 5 MG
10 TABLET ORAL
Refills: 0 | Status: DISCONTINUED | OUTPATIENT
Start: 2019-12-21 | End: 2019-12-24

## 2019-12-21 RX ORDER — OXYCODONE HYDROCHLORIDE 5 MG/1
5 TABLET ORAL EVERY 4 HOURS
Refills: 0 | Status: DISCONTINUED | OUTPATIENT
Start: 2019-12-21 | End: 2019-12-24

## 2019-12-21 RX ORDER — ACETAMINOPHEN 500 MG
650 TABLET ORAL EVERY 6 HOURS
Refills: 0 | Status: DISCONTINUED | OUTPATIENT
Start: 2019-12-21 | End: 2019-12-22

## 2019-12-21 RX ORDER — AMLODIPINE BESYLATE 2.5 MG/1
10 TABLET ORAL DAILY
Refills: 0 | Status: DISCONTINUED | OUTPATIENT
Start: 2019-12-21 | End: 2019-12-24

## 2019-12-21 RX ORDER — ASCORBIC ACID 60 MG
500 TABLET,CHEWABLE ORAL DAILY
Refills: 0 | Status: DISCONTINUED | OUTPATIENT
Start: 2019-12-21 | End: 2019-12-24

## 2019-12-21 RX ORDER — OXYCODONE AND ACETAMINOPHEN 5; 325 MG/1; MG/1
1 TABLET ORAL ONCE
Refills: 0 | Status: DISCONTINUED | OUTPATIENT
Start: 2019-12-21 | End: 2019-12-21

## 2019-12-21 RX ORDER — DEXTROSE 50 % IN WATER 50 %
15 SYRINGE (ML) INTRAVENOUS ONCE
Refills: 0 | Status: DISCONTINUED | OUTPATIENT
Start: 2019-12-21 | End: 2019-12-24

## 2019-12-21 RX ORDER — ENOXAPARIN SODIUM 100 MG/ML
40 INJECTION SUBCUTANEOUS DAILY
Refills: 0 | Status: DISCONTINUED | OUTPATIENT
Start: 2019-12-21 | End: 2019-12-24

## 2019-12-21 RX ORDER — CLOPIDOGREL BISULFATE 75 MG/1
75 TABLET, FILM COATED ORAL DAILY
Refills: 0 | Status: DISCONTINUED | OUTPATIENT
Start: 2019-12-21 | End: 2019-12-24

## 2019-12-21 RX ORDER — PANTOPRAZOLE SODIUM 20 MG/1
40 TABLET, DELAYED RELEASE ORAL
Refills: 0 | Status: DISCONTINUED | OUTPATIENT
Start: 2019-12-21 | End: 2019-12-24

## 2019-12-21 RX ORDER — ACETAMINOPHEN 500 MG
650 TABLET ORAL ONCE
Refills: 0 | Status: COMPLETED | OUTPATIENT
Start: 2019-12-21 | End: 2019-12-21

## 2019-12-21 RX ORDER — OMEGA-3 ACID ETHYL ESTERS 1 G
2 CAPSULE ORAL DAILY
Refills: 0 | Status: DISCONTINUED | OUTPATIENT
Start: 2019-12-21 | End: 2019-12-24

## 2019-12-21 RX ORDER — GABAPENTIN 400 MG/1
600 CAPSULE ORAL
Refills: 0 | Status: DISCONTINUED | OUTPATIENT
Start: 2019-12-21 | End: 2019-12-24

## 2019-12-21 RX ORDER — HYDROCHLOROTHIAZIDE 25 MG
12.5 TABLET ORAL DAILY
Refills: 0 | Status: DISCONTINUED | OUTPATIENT
Start: 2019-12-21 | End: 2019-12-24

## 2019-12-21 RX ORDER — GLUCAGON INJECTION, SOLUTION 0.5 MG/.1ML
1 INJECTION, SOLUTION SUBCUTANEOUS ONCE
Refills: 0 | Status: DISCONTINUED | OUTPATIENT
Start: 2019-12-21 | End: 2019-12-24

## 2019-12-21 RX ADMIN — OXYCODONE AND ACETAMINOPHEN 1 TABLET(S): 5; 325 TABLET ORAL at 18:11

## 2019-12-21 RX ADMIN — OXYCODONE AND ACETAMINOPHEN 1 TABLET(S): 5; 325 TABLET ORAL at 14:29

## 2019-12-21 RX ADMIN — ATORVASTATIN CALCIUM 10 MILLIGRAM(S): 80 TABLET, FILM COATED ORAL at 22:25

## 2019-12-21 RX ADMIN — Medication 650 MILLIGRAM(S): at 19:56

## 2019-12-21 RX ADMIN — Medication 650 MILLIGRAM(S): at 18:11

## 2019-12-21 RX ADMIN — GABAPENTIN 600 MILLIGRAM(S): 400 CAPSULE ORAL at 22:25

## 2019-12-21 NOTE — ED ADULT NURSE REASSESSMENT NOTE - NS ED NURSE REASSESS COMMENT FT1
pt alert,oriented x 3. states pain l knee since yesterday   awaits admission   as per pa. pt states unable to ambulate since yesterday. will continue to monitor

## 2019-12-21 NOTE — ED ADULT NURSE NOTE - NSFALLRSKHMRSKTYOTFT_ED_ALL_ED
Patient:   ELIDA GALVAN            MRN: [id number]            FIN: [id number]              Age:   87 years     Sex:  MALE     :  32   Associated Diagnoses:   None   Author:   DEVIN, MAO COLVIN     **Required when the History and Physical has been performed prior to Registration**  (within a 30 day period, if it's over 30 days then a new and complete History and Physicial needs to be completed)  **An update to the history and phsycial must be completed prior to the start of the surgery or procedure requiring anesthesia services.**    Prior to the procedure the History and Physicial from date 19 _ has been reviewed and I have examined the patient.  Based upon my physicial assessment and interview of the patient:    Check and/or complete:    [X] No significant changes have occurred in the patient's condition since the History and Physical was completed.      OR    [_] Changes to History and Physical (see below):     _   pt unable to ambulate

## 2019-12-21 NOTE — H&P ADULT - NSICDXPASTSURGICALHX_GEN_ALL_CORE_FT
PAST SURGICAL HISTORY:  H/O:      History of hysteroscopy     S/P appendectomy     S/P cholecystectomy     S/P rotator cuff surgery

## 2019-12-21 NOTE — H&P ADULT - NSICDXPASTMEDICALHX_GEN_ALL_CORE_FT
PAST MEDICAL HISTORY:  CAD S/P percutaneous coronary angioplasty     Diabetes     Hyperlipemia     Hypertension     Osteoarthritis     Urinary incontinence in female

## 2019-12-21 NOTE — H&P ADULT - NSHPLABSRESULTS_GEN_ALL_CORE
(12-21 @ 18:10)                      12.5  8.83 )-----------( 296                 39.3    Neutrophils = 5.77 (65.3%)  Lymphocytes = 2.27 (25.7%)  Eosinophils = 0.15 (1.7%)  Basophils = 0.04 (0.5%)  Monocytes = 0.58 (6.6%)  Bands = --%    12-21    141  |  102  |  23  ----------------------------<  169<H>  4.3   |  24  |  0.91    Ca    9.7      21 Dec 2019 18:10    TPro  6.9  /  Alb  4.1  /  TBili  0.3  /  DBili  x   /  AST  16  /  ALT  18  /  AlkPhos  55  12-21      Labs reviewed  Imaging reviewed    < from: Xray Hip 2-3 Views, Left (12.21.19 @ 15:25) >    INTERPRETATION:  no emergent findings.    follow up official report in AM    < end of copied text >    < from: Xray Knee w/Patella 3 View, Left (12.21.19 @ 15:25) >    INTERPRETATION:  severe tricompartmental knee arthrosis.     no acute fracture or dislocation. small joint effusion.     soft tissue swelling.       follow up official report in AM    < end of copied text >    < from: US Duplex Venous Lower Ext Ltd, Left (12.21.19 @ 16:19) >      IMPRESSION:     No evidence of left lower extremity deep venous thrombosis.        < end of copied text >

## 2019-12-21 NOTE — ED PROVIDER NOTE - OBJECTIVE STATEMENT
70 Y/O F PMH HTN HLD DM states that she has had pain behind the L knee when she got up yesterday. Pt states she has had this pain in the past but has it has improved with lidocaine in the past. Pt states the pain is worse when she tries to extend her knee. Pt denies back pain, numbness or weakness. Pt states the pain feels sharp. Pt states the pain is currently 7/10. Pt states she has been taking tylenol with little improvement, cannot take NSAIDS due to recently stent on ASA and Plavix. Denies leg swelling or redness, recent travel or H/O  blood clots personally or in the family. No CP, SOB ABD PN N V D DIzz or any other sx or complaints. PSH Hysterectomy appendectomy C section.Pt states she cannot bear weight from the pain, states she had to call an ambulance. 68 Y/O F PMH HTN HLD DM states that she has had pain behind the L knee when she got up yesterday. Pt states she has had this pain in the past but has it has improved with lidocaine in the past. Pt states the pain is worse when she tries to extend her knee. Pt denies back pain, numbness or weakness. Pt states the pain feels sharp. Pt states the pain is currently 7/10. Pt states she has been taking tylenol with little improvement, cannot take NSAIDS due to recently stent on ASA and Plavix. Denies leg swelling or redness, recent travel or H/O  blood clots personally or in the family. No CP, SOB ABD PN N V D DIzz or any other sx or complaints. PSH Hysterectomy appendectomy C section.Pt states she cannot bear weight from the pain, states she had to call an ambulance.    Attending;  68yo female presents with left knee pain since today.  pain when she bears weight on the leg.  no pain when she is not standing or putting weight on the knee.  no redness.

## 2019-12-21 NOTE — H&P ADULT - PROBLEM SELECTOR PLAN 1
-Pt presents with acute on chronic left knee pain. No recent trauma. No joint erythema on exam, point tenderness in posterior knee. No joint laxity. No signs of infection  -Xray reviewed. Small joint effusion, no bony pathology  -US results reviewed, negative for DVT  -Suspect acute pain likely 2/2 to baker's cysts vs tendon/ligament strain  -Pt's recent stent placement prohibits nsaid use. As such, continue with tylenol and oxycodone for pain control. Topical lidocaine  -If no improvement, consider repeat steroid injections   -PT eval

## 2019-12-21 NOTE — ED PROVIDER NOTE - PROGRESS NOTE DETAILS
SAM Linton: Pt reassessed remains unable to ambulate after Bulky Rodriguez dressing and cane. Additional tylenol ordered as pt took tylenol earlier today. Prednisone not a good option as pt is diabetic, cannot have NSAIDS due to recent stents limited improvement after Oxycodone. SAM Linton: Called Pt's PMD Dr. Adam Salgado , awaiting callback, discussed case with SW, pt will likely require admission due to inability to ambulate. Crutches are not an option as pt has back issues and has had multiple rotator cuff repairs. SAM Linton: Discussed case with pt's PMD Dr Salgado agrees with plan of admission, pt accepted to Dr. Mccall pending IV access, will accept with labs pending.

## 2019-12-21 NOTE — H&P ADULT - HISTORY OF PRESENT ILLNESS
70 Y/O F PMH CAD s/p stent 11/26, HTN, HLD, DM2 states p/w with L knee pain. Patient reports the pain started in the back of the knee acutely at 5:30pm yesterday while getting up from a chair. Denies recent trauma. Pt states she has had this pain in the past but has it has improved with lidocaine in the hyaluronic acid and hydrocortisone injections. Pt states the pain is worse when she tries to extend her knee. Pt denies knee being more swollen than usual, back pain, numbness or weakness. Pt states the pain feels sharp was 7/10. Pt states she has been taking tylenol with little improvement, cannot take NSAIDS due to recently stent on ASA and Plavix. Denies leg swelling or redness, recent travel or H/O  blood clots personally or in the family. No fevers, chills, CP, SOB ABD pain, N/V/D or any other sx or complaints. Pt states she cannot bear weight from the pain, states she had to call an ambulance.

## 2019-12-21 NOTE — ED PROVIDER NOTE - CLINICAL SUMMARY MEDICAL DECISION MAKING FREE TEXT BOX
70 Y/O F PMH HTN HLD DM states that she has had pain behind the L knee when she got up yesterday. Will U/S to R/O DVT and eval for baker's cyst. WIll X ray hip and knee to R/O FX. Pt with palpable dorsal pulses bilaterally. Will pain control with Percocet. Pt has not been able to ambulate due to pain, will reassess after oxycodone.

## 2019-12-21 NOTE — ED ADULT TRIAGE NOTE - CHIEF COMPLAINT QUOTE
Pt c/o atraumatic pain to behind Lt leg that started yesterday, pt denies sob, no swelling noted to Lt leg, pt on plavix and aspirin for hx of DVT in the past.

## 2019-12-21 NOTE — H&P ADULT - PROBLEM SELECTOR PLAN 3
-continue with low ISS  -pt due for bydureon injection which she will bring from home  -Consistent carbs diet  -c/w gabapentin  -f/u a1c

## 2019-12-21 NOTE — H&P ADULT - PROBLEM SELECTOR PLAN 8
1.  Name of PCP: Dr. Adam David  2.  PCP Contacted on Admission: [ ] Y    [ X] N    3.  PCP contacted at Discharge: [ ] Y    [ ] N    [ ] N/A  4.  Post-Discharge Appointment Date and Location:  5.  Summary of Handoff given to PCP:

## 2019-12-21 NOTE — H&P ADULT - NSHPREVIEWOFSYSTEMS_GEN_ALL_CORE
CONSTITUTIONAL:  No weight loss, fever, chills, weakness or fatigue.  HEENT:  Eyes:  No visual loss, blurred vision, double vision or yellow sclerae. Ears, Nose, Throat:  No hearing loss, sneezing, congestion, runny nose or sore throat.  SKIN:  No rash or itching.  CARDIOVASCULAR:  No chest pain, chest pressure or chest discomfort. No palpitations or edema.  RESPIRATORY:  No shortness of breath, cough or sputum.  GASTROINTESTINAL:  No anorexia, nausea, vomiting or diarrhea. No abdominal pain or blood.  GENITOURINARY:  Denies hematuria, dysuria.   NEUROLOGICAL:  No headache, dizziness, syncope, paralysis, ataxia, numbness or tingling in the extremities. No change in bowel or bladder control.  MUSCULOSKELETAL:  No muscle, back pain, joint pain or stiffness.  HEMATOLOGIC:  No anemia, bleeding or bruising.  LYMPHATICS:  No enlarged nodes. No history of splenectomy.  PSYCHIATRIC:  No history of depression or anxiety.  ENDOCRINOLOGIC:  No reports of sweating, cold or heat intolerance. No polyuria or polydipsia.  ALLERGIES:  No history of asthma, hives, eczema or rhinitis. CONSTITUTIONAL:  No weight loss, fever, chills, weakness or fatigue.  HEENT:  Eyes:  No visual loss, blurred vision, double vision or yellow sclerae. Ears, Nose, Throat:  No hearing loss, sneezing, congestion, runny nose or sore throat.  SKIN:  No rash or itching.  CARDIOVASCULAR:  No chest pain, chest pressure or chest discomfort. No palpitations or edema.  RESPIRATORY:  No shortness of breath, cough or sputum.  GASTROINTESTINAL:  No anorexia, nausea, vomiting or diarrhea. No abdominal pain or blood.  GENITOURINARY:  Denies hematuria, dysuria.   NEUROLOGICAL:  No headache, dizziness, syncope, paralysis, ataxia, numbness or tingling in the extremities. No change in bowel or bladder control.  MUSCULOSKELETAL:  +knee pain and stiffness with flexion and on ambulation No muscle, back pain  HEMATOLOGIC:  No anemia, bleeding or bruising.  LYMPHATICS:  No enlarged nodes. No history of splenectomy.  PSYCHIATRIC:  No history of depression or anxiety.  ENDOCRINOLOGIC:  No reports of sweating, cold or heat intolerance. No polyuria or polydipsia.  ALLERGIES:  No history of asthma, hives, eczema or rhinitis.

## 2019-12-21 NOTE — ED PROVIDER NOTE - PHYSICAL EXAMINATION
MSK: L knee with tenderness in posterior aspect. No joint effusion. ROM is limited by pain. No erythema or warmth, no palpable kimberly abnormality/contusion or traumatic abnormality. No calf tenderness bilaterally. No spinous process, SI or paraspinal muscle tenderness to palpation. Palpable dorsal pulses bilaterally.

## 2019-12-21 NOTE — H&P ADULT - ASSESSMENT
68 Y/O F PMH CAD s/p stent 11/26, HTN, HLD, DM2 states p/w with acute on chronic L knee pain likely 2/2 to baker's cyst vs osteoarthritis 68 Y/O F PMH CAD s/p stent 11/26, HTN, HLD, DM2 states p/w with acute on chronic L knee pain likely 2/2 to baker's cyst vs tendon/ligamental injury

## 2019-12-22 ENCOUNTER — TRANSCRIPTION ENCOUNTER (OUTPATIENT)
Age: 69
End: 2019-12-22

## 2019-12-22 PROCEDURE — 99232 SBSQ HOSP IP/OBS MODERATE 35: CPT

## 2019-12-22 RX ORDER — ACETAMINOPHEN 500 MG
1000 TABLET ORAL EVERY 8 HOURS
Refills: 0 | Status: DISCONTINUED | OUTPATIENT
Start: 2019-12-22 | End: 2019-12-24

## 2019-12-22 RX ORDER — LIDOCAINE 4 G/100G
1 CREAM TOPICAL DAILY
Refills: 0 | Status: DISCONTINUED | OUTPATIENT
Start: 2019-12-22 | End: 2019-12-24

## 2019-12-22 RX ADMIN — Medication 12.5 MILLIGRAM(S): at 05:21

## 2019-12-22 RX ADMIN — LIDOCAINE 1 PATCH: 4 CREAM TOPICAL at 19:43

## 2019-12-22 RX ADMIN — GABAPENTIN 600 MILLIGRAM(S): 400 CAPSULE ORAL at 05:21

## 2019-12-22 RX ADMIN — Medication 81 MILLIGRAM(S): at 11:43

## 2019-12-22 RX ADMIN — Medication 10 MILLIGRAM(S): at 17:34

## 2019-12-22 RX ADMIN — Medication 10 MILLIGRAM(S): at 05:21

## 2019-12-22 RX ADMIN — Medication 1000 MILLIGRAM(S): at 16:35

## 2019-12-22 RX ADMIN — LIDOCAINE 1 APPLICATION(S): 4 CREAM TOPICAL at 17:26

## 2019-12-22 RX ADMIN — GABAPENTIN 600 MILLIGRAM(S): 400 CAPSULE ORAL at 17:25

## 2019-12-22 RX ADMIN — AMLODIPINE BESYLATE 10 MILLIGRAM(S): 2.5 TABLET ORAL at 05:21

## 2019-12-22 RX ADMIN — ATORVASTATIN CALCIUM 10 MILLIGRAM(S): 80 TABLET, FILM COATED ORAL at 21:03

## 2019-12-22 RX ADMIN — Medication 500 MILLIGRAM(S): at 11:43

## 2019-12-22 RX ADMIN — Medication 1: at 08:29

## 2019-12-22 RX ADMIN — Medication 2: at 12:21

## 2019-12-22 RX ADMIN — OXYCODONE HYDROCHLORIDE 5 MILLIGRAM(S): 5 TABLET ORAL at 06:50

## 2019-12-22 RX ADMIN — LIDOCAINE 1 APPLICATION(S): 4 CREAM TOPICAL at 05:21

## 2019-12-22 RX ADMIN — OXYCODONE HYDROCHLORIDE 5 MILLIGRAM(S): 5 TABLET ORAL at 06:11

## 2019-12-22 RX ADMIN — LIDOCAINE 1 PATCH: 4 CREAM TOPICAL at 16:35

## 2019-12-22 RX ADMIN — Medication 1000 MILLIGRAM(S): at 21:03

## 2019-12-22 RX ADMIN — PANTOPRAZOLE SODIUM 40 MILLIGRAM(S): 20 TABLET, DELAYED RELEASE ORAL at 05:22

## 2019-12-22 RX ADMIN — CLOPIDOGREL BISULFATE 75 MILLIGRAM(S): 75 TABLET, FILM COATED ORAL at 11:44

## 2019-12-22 RX ADMIN — Medication 2 GRAM(S): at 11:44

## 2019-12-22 RX ADMIN — Medication 2: at 17:26

## 2019-12-22 NOTE — PHYSICAL THERAPY INITIAL EVALUATION ADULT - DIAGNOSIS, PT EVAL
Pt admitted for Left Knee pain; X-ray of Left knee and Left Hip (-)for fx or dislocation; pt presents with decreased strength, decreased balance, and antalgic gait. Pt admitted for Left Knee pain; X-ray of Left knee and Left Hip (-)for fx or dislocation; US duplex (-) for DVT; pt presents with decreased strength, decreased balance, and antalgic gait.

## 2019-12-22 NOTE — DISCHARGE NOTE PROVIDER - HOSPITAL COURSE
70 yo F with morbid obesity, HTN, HLD, DM2, CAD s/p stent 11/26 who reports acute on chronic L knee pain, found to have moderate L knee effusion in the setting of moderate osteoarthritis     No signs of infection. Due to underlying OA. X-ray showed Small joint effusion, no bony pathology. US results reviewed, negative for DVT. Recent stent placement prohibits NSAID use. therefore pain control with standing tylenol 1000mg q8h, oxycodone prn; cannot tolerate tramadol due to severe nausea/vomiting, trial of duloxetine 30mg daily which pt found helpful, If tolerable, recommend increasing to 60mg daily     CAD S/P percutaneous coronary angioplasty- c/w asa and plavix give PCI placement in Nov 2019    Type 2 diabetes mellitus with diabetic polyneuropathy, HbA1c 9.0%. Pt started on     - lantus 8 qhs and Sliding scale, Consistent carbs diet c/w gabapentin    Hypertension c/w amlodipine and HCTZ.         Hyperlipemia -c/w atorvastatin.         Urinary incontinence in female -c/w oxybutynin.

## 2019-12-22 NOTE — DISCHARGE NOTE PROVIDER - NSDCMRMEDTOKEN_GEN_ALL_CORE_FT
Amitiza 8 mcg oral capsule: 1 cap(s) orally once a day  amLODIPine 10 mg oral tablet: 1 tab(s) orally once a day  aspirin 81 mg oral tablet: 1 tab(s) orally once a day  Bydureon Kit 2 mg subcutaneous injection, extended release: 2 milligram(s) subcutaneous once a week (saturdays)  Detrol LA 4 mg oral capsule, extended release: 1 cap(s) orally once a day  Dexilant 60 mg oral delayed release capsule: 1 cap(s) orally once a day  Fish Oil oral capsule:   gabapentin 600 mg oral tablet: 1 tab(s) orally 2 times a day  hydroCHLOROthiazide 12.5 mg oral tablet: 1 tab(s) orally once a day  lysine 500 mg oral tablet: 1 tab(s) orally once a day  metFORMIN 1000 mg oral tablet: 1 tab(s) orally 2 times a day  Plavix 75 mg oral tablet: 1 tab(s) orally once a day  pravastatin 40 mg oral tablet: 1 tab(s) orally once a day  Vitamin C 500 mg oral tablet: 1 tab(s) orally once a day acetaminophen 500 mg oral tablet: 2 tab(s) orally every 8 hours  Amitiza 8 mcg oral capsule: 1 cap(s) orally once a day  amLODIPine 10 mg oral tablet: 1 tab(s) orally once a day  aspirin 81 mg oral tablet: 1 tab(s) orally once a day  Bydureon Kit 2 mg subcutaneous injection, extended release: 2 milligram(s) subcutaneous once a week (saturdays)  Detrol LA 4 mg oral capsule, extended release: 1 cap(s) orally once a day  DULoxetine 30 mg oral delayed release capsule: 1 cap(s) orally once a day  Fish Oil oral capsule:   gabapentin 600 mg oral tablet: 1 tab(s) orally 2 times a day  hydroCHLOROthiazide 12.5 mg oral tablet: 1 tab(s) orally once a day  insulin glargine: 8 unit(s) subcutaneous once a day (at bedtime)  insulin lispro (concentrated) 200 units/mL subcutaneous solution: 1 Unit(s) if Glucose 151 - 200  2 Unit(s) if Glucose 201 - 250  3 Unit(s) if Glucose 251 - 300  4 Unit(s) if Glucose 301 - 350  5 Unit(s) if Glucose 351 - 400  6 Unit(s) if Glucose Greater Than 400  lysine 500 mg oral tablet: 1 tab(s) orally once a day  metFORMIN 1000 mg oral tablet: 1 tab(s) orally 2 times a day  oxyCODONE 5 mg oral tablet: 1 tab(s) orally every 4 hours, As needed, Moderate Pain (4 - 6) and severe pain (7-10)  pantoprazole 40 mg oral delayed release tablet: 1 tab(s) orally once a day (before a meal)  Plavix 75 mg oral tablet: 1 tab(s) orally once a day  pravastatin 40 mg oral tablet: 1 tab(s) orally once a day  Vitamin C 500 mg oral tablet: 1 tab(s) orally once a day

## 2019-12-22 NOTE — PROGRESS NOTE ADULT - PROBLEM SELECTOR PLAN 1
-Pt presents with acute on chronic left knee pain. No recent trauma. No joint erythema on exam, point tenderness in posterior knee. No joint laxity. No signs of infection  -Xray reviewed. Small joint effusion, no bony pathology  -US results reviewed, negative for DVT  -Suspect acute pain likely 2/2 to baker's cysts vs tendon/ligament strain  -Pt's recent stent placement prohibits nsaid use. As such, continue with tylenol and oxycodone for pain control. Topical lidocaine  -If no improvement, consider repeat steroid injections   -PT eval -Pt presents with acute on chronic left knee pain. No recent trauma. No joint erythema on exam, point tenderness in posterior knee. No joint laxity. No signs of infection. Due to underlying OA. Xray reviewed. Small joint effusion, no bony pathology. US results reviewed, negative for DVT  -Pt's recent stent placement prohibits NSAID use. therefore pain control with standing tylenol 1000mg q8h  - d/c oxycodone since pt with gastroparesis at baseline and suffers from constipation. Pt with adverse GI tolerance for tramadol  - trial of duloxetine 30mg daily x7 days. If tolerable, recommend increasing to 60mg daily  -If no improvement, consider repeat steroid injections, which can be done as an outpatient  -PT eval complete --> pt deemed candidate for home PT. However, will need to be able to walk up stairs before d/c

## 2019-12-22 NOTE — PROGRESS NOTE ADULT - PROBLEM SELECTOR PLAN 3
-continue with low ISS  -pt due for bydureon injection which she will bring from home  -Consistent carbs diet  -c/w gabapentin  -f/u a1c HbA1c 9.0% indicating suboptimal glycemic control  -continue with low ISS  -pt due for bydureon injection which she will bring from home. Will need to verify med with pharmacy before administering  -Consistent carbs diet  -c/w gabapentin

## 2019-12-22 NOTE — PHYSICAL THERAPY INITIAL EVALUATION ADULT - PATIENT PROFILE REVIEW, REHAB EVAL
No Formal Activity Order in the Computer; spoke with RN Marco Bermudez prior to PT evaluation--> Pt OK for PT consult/OOB activity/yes

## 2019-12-22 NOTE — PHYSICAL THERAPY INITIAL EVALUATION ADULT - ADDITIONAL COMMENTS
Pt reports that she lives in a private house with her  and daughter with ~6 steps to enter; (+)bilateral handrails far apart; and a flight of stair to negotiate to bedroom; (+)1 handrail. There is also a stair lift inside for her . Prior to hospital admission pt was completely independent and used a quad cane PRN. Pt also has a rolling walker in her basement if she needs. Pt's last fall was >4 months ago.    Pt left comfortable in seated @ edge of bed, NAD, all lines intact, all precautions maintained, with call bell in reach, and RN aware of PT evaluation.

## 2019-12-22 NOTE — PROGRESS NOTE ADULT - PROBLEM SELECTOR PLAN 2
-continue with aspirin and plavix  -continue with statin -continue with aspirin and plavix given recent stent placement in Nov 2019  -continue with statin

## 2019-12-22 NOTE — DISCHARGE NOTE PROVIDER - NSDCCPCAREPLAN_GEN_ALL_CORE_FT
PRINCIPAL DISCHARGE DIAGNOSIS  Diagnosis: Inability to ambulate due to left knee  Assessment and Plan of Treatment: found to have moderate L knee effusion in the setting of moderate osteoarthritis. X-ray showed Small joint effusion, no bony pathology. Ultrasound of lower extremities were negative for DVT.   DO NOT TAKE NSAID such as Motrin/Advil or ALeve  continue w/ tylenol, oxycodone  as needed  Trial of duloxetine 30mg daily , disuss with a medical provider at rehab to consider increasing the dose if needed        SECONDARY DISCHARGE DIAGNOSES  Diagnosis: Type 2 diabetes mellitus with diabetic polyneuropathy, without long-term current use of insulin  Assessment and Plan of Treatment: HbA1c 9.0% goal below 7%  You were started on Pt lantus 8 Units at bed time, follow  Consistent carbs diet and use Novolog as per sliding scale   Continue  gabapentin      Diagnosis: Hyperlipemia  Assessment and Plan of Treatment: continue atorvastatin.    Diagnosis: CAD S/P percutaneous coronary angioplasty  Assessment and Plan of Treatment: Continue Aspirin and Plavix, follow up with your cardiologist as per routine scheduled       Diagnosis: Hypertension  Assessment and Plan of Treatment: Continue low salt diet, amlodipine and HCTZ.    Diagnosis: Arthritis  Assessment and Plan of Treatment: pain control

## 2019-12-22 NOTE — PROGRESS NOTE ADULT - SUBJECTIVE AND OBJECTIVE BOX
Patient is a 69y old  Female who presents with a chief complaint of knee pain (21 Dec 2019 21:58)        SUBJECTIVE / OVERNIGHT EVENTS:      MEDICATIONS  (STANDING):  amLODIPine   Tablet 10 milliGRAM(s) Oral daily  ascorbic acid 500 milliGRAM(s) Oral daily  aspirin  chewable 81 milliGRAM(s) Oral daily  atorvastatin 10 milliGRAM(s) Oral at bedtime  clopidogrel Tablet 75 milliGRAM(s) Oral daily  dextrose 5%. 1000 milliLiter(s) (50 mL/Hr) IV Continuous <Continuous>  dextrose 50% Injectable 12.5 Gram(s) IV Push once  dextrose 50% Injectable 25 Gram(s) IV Push once  dextrose 50% Injectable 25 Gram(s) IV Push once  enoxaparin Injectable 40 milliGRAM(s) SubCutaneous daily  gabapentin 600 milliGRAM(s) Oral two times a day  hydrochlorothiazide 12.5 milliGRAM(s) Oral daily  insulin lispro (HumaLOG) corrective regimen sliding scale   SubCutaneous three times a day before meals  insulin lispro (HumaLOG) corrective regimen sliding scale   SubCutaneous at bedtime  lidocaine 4% Topical Solution 1 Application(s) Topical two times a day  omega-3-Acid Ethyl Esters 2 Gram(s) Oral daily  oxybutynin 10 milliGRAM(s) Oral two times a day  pantoprazole    Tablet 40 milliGRAM(s) Oral before breakfast    MEDICATIONS  (PRN):  acetaminophen   Tablet .. 650 milliGRAM(s) Oral every 6 hours PRN Mild Pain (1 - 3)  dextrose 40% Gel 15 Gram(s) Oral once PRN Blood Glucose LESS THAN 70 milliGRAM(s)/deciliter  glucagon  Injectable 1 milliGRAM(s) IntraMuscular once PRN Glucose LESS THAN 70 milligrams/deciliter  oxyCODONE    IR 5 milliGRAM(s) Oral every 4 hours PRN Moderate Pain (4 - 6) and severe pain (7-10)      Vital Signs Last 24 Hrs  T(C): 36.6 (22 Dec 2019 05:19), Max: 36.8 (21 Dec 2019 15:49)  T(F): 97.9 (22 Dec 2019 05:19), Max: 98.3 (21 Dec 2019 15:49)  HR: 82 (22 Dec 2019 05:19) (81 - 90)  BP: 134/66 (22 Dec 2019 05:19) (132/51 - 162/74)  BP(mean): --  RR: 18 (22 Dec 2019 05:19) (15 - 19)  SpO2: 95% (22 Dec 2019 05:19) (95% - 100%)  CAPILLARY BLOOD GLUCOSE      POCT Blood Glucose.: 156 mg/dL (22 Dec 2019 08:24)  POCT Blood Glucose.: 162 mg/dL (21 Dec 2019 22:41)    I&O's Summary        PHYSICAL EXAM  GENERAL: NAD, well-developed  HEAD:  Atraumatic, Normocephalic  EYES: EOMI, PERRLA, conjunctiva and sclera clear  NECK: Supple, No JVD  CHEST/LUNG: Clear to auscultation bilaterally; No wheeze  HEART: Regular rate and rhythm; No murmurs, rubs, or gallops  ABDOMEN: Soft, Nontender, Nondistended; Bowel sounds present  EXTREMITIES:  2+ Peripheral Pulses, No clubbing, cyanosis, or edema  PSYCH: AAOx3  SKIN: No rashes or lesions    LABS:                        12.5   8.83  )-----------( 296      ( 21 Dec 2019 18:10 )             39.3     12-21    141  |  102  |  23  ----------------------------<  169<H>  4.3   |  24  |  0.91    Ca    9.7      21 Dec 2019 18:10    TPro  6.9  /  Alb  4.1  /  TBili  0.3  /  DBili  x   /  AST  16  /  ALT  18  /  AlkPhos  55  12-21              RADIOLOGY & ADDITIONAL TESTS:    Imaging Personally Reviewed:  Consultant(s) Notes Reviewed:    Care Discussed with Consultants/Other Providers: Patient is a 69y old  Female who presents with a chief complaint of knee pain (21 Dec 2019 21:58)    SUBJECTIVE / OVERNIGHT EVENTS:  Patient seen and examined in the afternoon. Patient with pain in left knee. She states oxycodone helped her pain a little but made her sleep. Patient having difficulty climbing up stairs and states she has 6 stairs to get to her front door. She has swelling in her knee but no focal LE weakness or numbness. Knee does not buckle. No fever, chills, SOB, n/v.    MEDICATIONS  (STANDING):  amLODIPine   Tablet 10 milliGRAM(s) Oral daily  ascorbic acid 500 milliGRAM(s) Oral daily  aspirin  chewable 81 milliGRAM(s) Oral daily  atorvastatin 10 milliGRAM(s) Oral at bedtime  clopidogrel Tablet 75 milliGRAM(s) Oral daily  dextrose 5%. 1000 milliLiter(s) (50 mL/Hr) IV Continuous <Continuous>  dextrose 50% Injectable 12.5 Gram(s) IV Push once  dextrose 50% Injectable 25 Gram(s) IV Push once  dextrose 50% Injectable 25 Gram(s) IV Push once  enoxaparin Injectable 40 milliGRAM(s) SubCutaneous daily  gabapentin 600 milliGRAM(s) Oral two times a day  hydrochlorothiazide 12.5 milliGRAM(s) Oral daily  insulin lispro (HumaLOG) corrective regimen sliding scale   SubCutaneous three times a day before meals  insulin lispro (HumaLOG) corrective regimen sliding scale   SubCutaneous at bedtime  lidocaine 4% Topical Solution 1 Application(s) Topical two times a day  omega-3-Acid Ethyl Esters 2 Gram(s) Oral daily  oxybutynin 10 milliGRAM(s) Oral two times a day  pantoprazole    Tablet 40 milliGRAM(s) Oral before breakfast    MEDICATIONS  (PRN):  acetaminophen   Tablet .. 650 milliGRAM(s) Oral every 6 hours PRN Mild Pain (1 - 3)  dextrose 40% Gel 15 Gram(s) Oral once PRN Blood Glucose LESS THAN 70 milliGRAM(s)/deciliter  glucagon  Injectable 1 milliGRAM(s) IntraMuscular once PRN Glucose LESS THAN 70 milligrams/deciliter  oxyCODONE    IR 5 milliGRAM(s) Oral every 4 hours PRN Moderate Pain (4 - 6) and severe pain (7-10)      Vital Signs Last 24 Hrs  T(C): 36.6 (22 Dec 2019 05:19), Max: 36.8 (21 Dec 2019 15:49)  T(F): 97.9 (22 Dec 2019 05:19), Max: 98.3 (21 Dec 2019 15:49)  HR: 82 (22 Dec 2019 05:19) (81 - 90)  BP: 134/66 (22 Dec 2019 05:19) (132/51 - 162/74)  BP(mean): --  RR: 18 (22 Dec 2019 05:19) (15 - 19)  SpO2: 95% (22 Dec 2019 05:19) (95% - 100%)  CAPILLARY BLOOD GLUCOSE      POCT Blood Glucose.: 156 mg/dL (22 Dec 2019 08:24)  POCT Blood Glucose.: 162 mg/dL (21 Dec 2019 22:41)    I&O's Summary        PHYSICAL EXAM  GENERAL: NAD, morbidly obese  CHEST/LUNG: Clear to auscultation bilaterally; No wheeze  HEART: Regular rate and rhythm; Decreased heart sounds due to body habitus  ABDOMEN: Soft, Nontender, Nondistended; Bowel sounds present  EXTREMITIES:  2+ Peripheral Pulses, No clubbing, cyanosis, or edema. Left knee with effusion but no erythema or warmth to touch.  PSYCH: AAOx3      LABS:                        12.5   8.83  )-----------( 296      ( 21 Dec 2019 18:10 )             39.3     12-21    141  |  102  |  23  ----------------------------<  169<H>  4.3   |  24  |  0.91    Ca    9.7      21 Dec 2019 18:10    TPro  6.9  /  Alb  4.1  /  TBili  0.3  /  DBili  x   /  AST  16  /  ALT  18  /  AlkPhos  55  12-21              RADIOLOGY & ADDITIONAL TESTS:    Imaging Personally Reviewed:  Consultant(s) Notes Reviewed:    Care Discussed with Consultants/Other Providers:

## 2019-12-22 NOTE — PHYSICAL THERAPY INITIAL EVALUATION ADULT - ACTIVE RANGE OF MOTION EXAMINATION, REHAB EVAL
bilateral upper extremity Active ROM was WFL (within functional limits)/bilateral  lower extremity Active ROM was WFL (within functional limits)/except left knee lacking ~5 degrees of extension

## 2019-12-22 NOTE — PHYSICAL THERAPY INITIAL EVALUATION ADULT - GAIT DEVIATIONS NOTED, PT EVAL
decreased stride length/pt unable to fully weight bear through left LE; pt performed toe touch weight bearing/decreased weight-shifting ability/decreased step length/decreased bean

## 2019-12-22 NOTE — PROGRESS NOTE ADULT - ASSESSMENT
Ms. Carter is a 68 yo woman with PMH notable for morbid obesity, CAD s/p stent 11/26, HTN, HLD, DM2 states p/w with acute on chronic L knee pain, found to have moderate R knee effusion in the setting of moderate osteoarthritis on X-ray imaging. Ms. Carter is a 68 yo woman with PMH notable for morbid obesity, CAD s/p stent 11/26, HTN, HLD, DM2 states p/w with acute on chronic L knee pain, found to have moderate R knee effusion in the setting of moderate osteoarthritis on X-ray imaging. Patient currently admitted for adequate pain control.

## 2019-12-22 NOTE — PHYSICAL THERAPY INITIAL EVALUATION ADULT - PERTINENT HX OF CURRENT PROBLEM, REHAB EVAL
68 Y/O F PMH CAD s/p stent 11/26, HTN, HLD, DM2 states p/w with acute on chronic Left knee pain likely 2/2 to baker's cyst vs tendon/ligamental injury 70 Y/O F PMH CAD s/p stent 11/26, HTN, HLD, DM2 states p/w with acute or chronic Left knee pain likely 2/2 to baker's cyst vs tendon/ligamental injury

## 2019-12-23 PROCEDURE — 99233 SBSQ HOSP IP/OBS HIGH 50: CPT

## 2019-12-23 RX ORDER — DULOXETINE HYDROCHLORIDE 30 MG/1
30 CAPSULE, DELAYED RELEASE ORAL DAILY
Refills: 0 | Status: DISCONTINUED | OUTPATIENT
Start: 2019-12-23 | End: 2019-12-24

## 2019-12-23 RX ORDER — INSULIN GLARGINE 100 [IU]/ML
8 INJECTION, SOLUTION SUBCUTANEOUS AT BEDTIME
Refills: 0 | Status: DISCONTINUED | OUTPATIENT
Start: 2019-12-23 | End: 2019-12-24

## 2019-12-23 RX ADMIN — LIDOCAINE 1 APPLICATION(S): 4 CREAM TOPICAL at 17:37

## 2019-12-23 RX ADMIN — Medication 10 MILLIGRAM(S): at 05:19

## 2019-12-23 RX ADMIN — LIDOCAINE 1 PATCH: 4 CREAM TOPICAL at 12:33

## 2019-12-23 RX ADMIN — LIDOCAINE 1 PATCH: 4 CREAM TOPICAL at 04:20

## 2019-12-23 RX ADMIN — PANTOPRAZOLE SODIUM 40 MILLIGRAM(S): 20 TABLET, DELAYED RELEASE ORAL at 05:20

## 2019-12-23 RX ADMIN — Medication 2: at 12:36

## 2019-12-23 RX ADMIN — ATORVASTATIN CALCIUM 10 MILLIGRAM(S): 80 TABLET, FILM COATED ORAL at 21:07

## 2019-12-23 RX ADMIN — Medication 500 MILLIGRAM(S): at 12:31

## 2019-12-23 RX ADMIN — GABAPENTIN 600 MILLIGRAM(S): 400 CAPSULE ORAL at 17:36

## 2019-12-23 RX ADMIN — INSULIN GLARGINE 8 UNIT(S): 100 INJECTION, SOLUTION SUBCUTANEOUS at 22:31

## 2019-12-23 RX ADMIN — CLOPIDOGREL BISULFATE 75 MILLIGRAM(S): 75 TABLET, FILM COATED ORAL at 12:33

## 2019-12-23 RX ADMIN — Medication 2 GRAM(S): at 12:32

## 2019-12-23 RX ADMIN — DULOXETINE HYDROCHLORIDE 30 MILLIGRAM(S): 30 CAPSULE, DELAYED RELEASE ORAL at 14:12

## 2019-12-23 RX ADMIN — LIDOCAINE 1 APPLICATION(S): 4 CREAM TOPICAL at 05:20

## 2019-12-23 RX ADMIN — AMLODIPINE BESYLATE 10 MILLIGRAM(S): 2.5 TABLET ORAL at 05:20

## 2019-12-23 RX ADMIN — Medication 1: at 17:35

## 2019-12-23 RX ADMIN — Medication 1000 MILLIGRAM(S): at 14:13

## 2019-12-23 RX ADMIN — Medication 1000 MILLIGRAM(S): at 21:08

## 2019-12-23 RX ADMIN — Medication 1000 MILLIGRAM(S): at 05:20

## 2019-12-23 RX ADMIN — Medication 12.5 MILLIGRAM(S): at 05:19

## 2019-12-23 RX ADMIN — Medication 10 MILLIGRAM(S): at 17:36

## 2019-12-23 RX ADMIN — LIDOCAINE 1 PATCH: 4 CREAM TOPICAL at 19:12

## 2019-12-23 RX ADMIN — Medication 1: at 08:37

## 2019-12-23 RX ADMIN — Medication 81 MILLIGRAM(S): at 12:32

## 2019-12-23 RX ADMIN — GABAPENTIN 600 MILLIGRAM(S): 400 CAPSULE ORAL at 05:19

## 2019-12-23 NOTE — PROGRESS NOTE ADULT - PROBLEM SELECTOR PLAN 1
Pt presents with acute on chronic left knee pain. No recent trauma. No joint erythema on exam, point tenderness in posterior knee. No joint laxity. No signs of infection. Due to underlying OA. Xray reviewed. Small joint effusion, no bony pathology. US results reviewed, negative for DVT  - recent stent placement prohibits NSAID use. therefore pain control with standing tylenol 1000mg q8h  - oxycodone prn; cannot tolerate tramadol due to severe nausea/vomiting  - trial of duloxetine 30mg daily x7 days. If tolerable, recommend increasing to 60mg daily  -If no improvement, consider repeat steroid injections, which can be done as an outpatient  -PT eval complete --> pt deemed candidate for home PT, unable to do stairs on 12/23, PT rec [facility]  - OP ortho f/u

## 2019-12-23 NOTE — PROGRESS NOTE ADULT - SUBJECTIVE AND OBJECTIVE BOX
Patient is a 69y old  Female who presents with a chief complaint of knee pain    SUBJECTIVE / OVERNIGHT EVENTS:    No CP, SOB, f/c/n/v  Reports unable to climb stairs and has 6 to get in, inside has a stair chair that is her husbands  reports knee issue is longstanding, reports has ortho for other reason but he doesn't do knees  reports cannot have knee surgery as recent stent and on plavix   reports severe nausea/vomiting with tramadol, doesn't want to take narcotics but only thing that takes the edge off  at home has lido patches and Voltaren gel     MEDICATIONS  (STANDING):  acetaminophen  Tablet 1000 milliGRAM(s) Oral every 8 hours  amLODIPine   Tablet 10 milliGRAM(s) Oral daily  ascorbic acid 500 milliGRAM(s) Oral daily  aspirin  chewable 81 milliGRAM(s) Oral daily  atorvastatin 10 milliGRAM(s) Oral at bedtime  clopidogrel Tablet 75 milliGRAM(s) Oral daily  DULoxetine 30 milliGRAM(s) Oral daily  enoxaparin Injectable 40 milliGRAM(s) SubCutaneous daily  gabapentin 600 milliGRAM(s) Oral two times a day  hydrochlorothiazide 12.5 milliGRAM(s) Oral daily  insulin lispro (HumaLOG) corrective regimen sliding scale   SubCutaneous three times a day before meals  insulin lispro (HumaLOG) corrective regimen sliding scale   SubCutaneous at bedtime  lidocaine   Patch 1 Patch Transdermal daily  lidocaine 4% Topical Solution 1 Application(s) Topical two times a day  omega-3-Acid Ethyl Esters 2 Gram(s) Oral daily  oxybutynin 10 milliGRAM(s) Oral two times a day  pantoprazole    Tablet 40 milliGRAM(s) Oral before breakfast    MEDICATIONS  (PRN):  dextrose 40% Gel 15 Gram(s) Oral once PRN Blood Glucose LESS THAN 70 milliGRAM(s)/deciliter  glucagon  Injectable 1 milliGRAM(s) IntraMuscular once PRN Glucose LESS THAN 70 milligrams/deciliter  oxyCODONE    IR 5 milliGRAM(s) Oral every 4 hours PRN Moderate Pain (4 - 6) and severe pain (7-10)    T(C): 36.6 (12-23-19 @ 13:22), Max: 36.9 (12-22-19 @ 21:49)  HR: 78 (12-23-19 @ 13:22) (72 - 78)  BP: 141/69 (12-23-19 @ 13:22) (133/66 - 150/58)  RR: 18 (12-23-19 @ 13:22) (17 - 18)  SpO2: 97% (12-23-19 @ 13:22) (96% - 98%)    CAPILLARY BLOOD GLUCOSE  POCT Blood Glucose.: 232 mg/dL (23 Dec 2019 11:58)  POCT Blood Glucose.: 195 mg/dL (23 Dec 2019 08:05)  POCT Blood Glucose.: 218 mg/dL (22 Dec 2019 22:06)  POCT Blood Glucose.: 223 mg/dL (22 Dec 2019 17:04)    PHYSICAL EXAM:  GENERAL: NAD, obese   CHEST/LUNG: Clear to auscultation bilaterally; No wheeze  HEART: Regular rate and rhythm; No murmurs, rubs, or gallops  ABDOMEN: Soft, Nontender, Nondistended; Bowel sounds present  EXTREMITIES:   warm and well perfused, No clubbing, cyanosis, or edema  PSYCH: AAOx3  NEUROLOGY: non-focal  SKIN: No rashes or lesions    LABS:                        12.5   8.83  )-----------( 296      ( 21 Dec 2019 18:10 )             39.3     12-21    141  |  102  |  23  ----------------------------<  169<H>  4.3   |  24  |  0.91    Ca    9.7      21 Dec 2019 18:10    TPro  6.9  /  Alb  4.1  /  TBili  0.3  /  DBili  x   /  AST  16  /  ALT  18  /  AlkPhos  55  12-21    Consultant(s) Notes Reviewed:    Care Discussed with Consultants/Other Providers:

## 2019-12-23 NOTE — PROGRESS NOTE ADULT - PROBLEM SELECTOR PLAN 3
HbA1c 9.0% indicating suboptimal glycemic control  - continue with low ISS  - pt due for bydureon injection which she will bring from home. Will need to verify med with pharmacy before administering  - lantus 8 qhs  - Consistent carbs diet  - c/w gabapentin  - nutrition eval  - needs OP f/u for medication management given poor control

## 2019-12-23 NOTE — PROGRESS NOTE ADULT - PROBLEM SELECTOR PLAN 8
1.  Name of PCP: Dr. Adam David  2.  PCP Contacted on Admission: [ ] Y    [ X] N    3.  PCP contacted at Discharge: [ X] Y    [ ] N    [ ] N/A called 12/23   4.  Post-Discharge Appointment Date and Location: possibly going to [facility]  5.  Summary of Handoff given to PCP: called 12/23 advised of reason for admission and possible dc to [facility]

## 2019-12-23 NOTE — PROGRESS NOTE ADULT - ASSESSMENT
68 yo F with morbid obesity, HTN, HLD, DM2, CAD s/p stent 11/26 who reports acute on chronic L knee pain, found to have moderate L knee effusion in the setting of moderate osteoarthritis admitted as cannot ambulate up her stairs due to pain.

## 2019-12-24 ENCOUNTER — TRANSCRIPTION ENCOUNTER (OUTPATIENT)
Age: 69
End: 2019-12-24

## 2019-12-24 VITALS
TEMPERATURE: 99 F | OXYGEN SATURATION: 95 % | RESPIRATION RATE: 18 BRPM | DIASTOLIC BLOOD PRESSURE: 71 MMHG | SYSTOLIC BLOOD PRESSURE: 146 MMHG | HEART RATE: 77 BPM

## 2019-12-24 DIAGNOSIS — M19.90 UNSPECIFIED OSTEOARTHRITIS, UNSPECIFIED SITE: ICD-10-CM

## 2019-12-24 PROBLEM — E11.9 TYPE 2 DIABETES MELLITUS WITHOUT COMPLICATIONS: Chronic | Status: ACTIVE | Noted: 2019-12-21

## 2019-12-24 PROBLEM — I25.10 ATHEROSCLEROTIC HEART DISEASE OF NATIVE CORONARY ARTERY WITHOUT ANGINA PECTORIS: Chronic | Status: ACTIVE | Noted: 2019-12-21

## 2019-12-24 PROBLEM — E78.5 HYPERLIPIDEMIA, UNSPECIFIED: Chronic | Status: ACTIVE | Noted: 2019-12-21

## 2019-12-24 PROBLEM — R32 UNSPECIFIED URINARY INCONTINENCE: Chronic | Status: ACTIVE | Noted: 2019-12-21

## 2019-12-24 PROBLEM — I10 ESSENTIAL (PRIMARY) HYPERTENSION: Chronic | Status: ACTIVE | Noted: 2019-12-21

## 2019-12-24 LAB
GLUCOSE BLDC GLUCOMTR-MCNC: 178 MG/DL — HIGH (ref 70–99)
GLUCOSE BLDC GLUCOMTR-MCNC: 203 MG/DL — HIGH (ref 70–99)

## 2019-12-24 PROCEDURE — 99239 HOSP IP/OBS DSCHRG MGMT >30: CPT

## 2019-12-24 RX ORDER — DULOXETINE HYDROCHLORIDE 30 MG/1
1 CAPSULE, DELAYED RELEASE ORAL
Qty: 0 | Refills: 0 | DISCHARGE
Start: 2019-12-24

## 2019-12-24 RX ORDER — DEXLANSOPRAZOLE 30 MG/1
1 CAPSULE, DELAYED RELEASE ORAL
Qty: 0 | Refills: 0 | DISCHARGE

## 2019-12-24 RX ORDER — INSULIN LISPRO 100/ML
1 VIAL (ML) SUBCUTANEOUS
Qty: 1 | Refills: 0
Start: 2019-12-24 | End: 2020-01-22

## 2019-12-24 RX ORDER — ACETAMINOPHEN 500 MG
2 TABLET ORAL
Qty: 0 | Refills: 0 | DISCHARGE
Start: 2019-12-24

## 2019-12-24 RX ORDER — PANTOPRAZOLE SODIUM 20 MG/1
1 TABLET, DELAYED RELEASE ORAL
Qty: 0 | Refills: 0 | DISCHARGE
Start: 2019-12-24

## 2019-12-24 RX ORDER — INSULIN GLARGINE 100 [IU]/ML
8 INJECTION, SOLUTION SUBCUTANEOUS
Qty: 0 | Refills: 0 | DISCHARGE
Start: 2019-12-24

## 2019-12-24 RX ORDER — OXYCODONE HYDROCHLORIDE 5 MG/1
1 TABLET ORAL
Qty: 0 | Refills: 0 | DISCHARGE
Start: 2019-12-24

## 2019-12-24 RX ADMIN — Medication 1000 MILLIGRAM(S): at 13:01

## 2019-12-24 RX ADMIN — Medication 81 MILLIGRAM(S): at 12:42

## 2019-12-24 RX ADMIN — CLOPIDOGREL BISULFATE 75 MILLIGRAM(S): 75 TABLET, FILM COATED ORAL at 12:41

## 2019-12-24 RX ADMIN — AMLODIPINE BESYLATE 10 MILLIGRAM(S): 2.5 TABLET ORAL at 05:42

## 2019-12-24 RX ADMIN — ENOXAPARIN SODIUM 40 MILLIGRAM(S): 100 INJECTION SUBCUTANEOUS at 12:42

## 2019-12-24 RX ADMIN — Medication 2: at 12:43

## 2019-12-24 RX ADMIN — PANTOPRAZOLE SODIUM 40 MILLIGRAM(S): 20 TABLET, DELAYED RELEASE ORAL at 05:42

## 2019-12-24 RX ADMIN — Medication 10 MILLIGRAM(S): at 05:42

## 2019-12-24 RX ADMIN — GABAPENTIN 600 MILLIGRAM(S): 400 CAPSULE ORAL at 05:42

## 2019-12-24 RX ADMIN — LIDOCAINE 1 PATCH: 4 CREAM TOPICAL at 00:21

## 2019-12-24 RX ADMIN — Medication 1: at 08:30

## 2019-12-24 RX ADMIN — DULOXETINE HYDROCHLORIDE 30 MILLIGRAM(S): 30 CAPSULE, DELAYED RELEASE ORAL at 12:42

## 2019-12-24 RX ADMIN — Medication 2 GRAM(S): at 12:41

## 2019-12-24 RX ADMIN — Medication 1000 MILLIGRAM(S): at 05:42

## 2019-12-24 RX ADMIN — Medication 12.5 MILLIGRAM(S): at 05:42

## 2019-12-24 RX ADMIN — Medication 1000 MILLIGRAM(S): at 12:40

## 2019-12-24 RX ADMIN — LIDOCAINE 1 APPLICATION(S): 4 CREAM TOPICAL at 05:42

## 2019-12-24 RX ADMIN — Medication 500 MILLIGRAM(S): at 12:41

## 2019-12-24 NOTE — PROGRESS NOTE ADULT - SUBJECTIVE AND OBJECTIVE BOX
Patient is a 69y old  Female who presents with a chief complaint of L knee pain, OA (23 Dec 2019 14:03)      SUBJECTIVE / OVERNIGHT EVENTS: patient seen and examined by bedside, knee pain controlled with current regimen , denies headache, dizziness, SOB, CP, Palpitations , N/V/D, abdominal pain        MEDICATIONS  (STANDING):  acetaminophen   Tablet .. 1000 milliGRAM(s) Oral every 8 hours  amLODIPine   Tablet 10 milliGRAM(s) Oral daily  ascorbic acid 500 milliGRAM(s) Oral daily  aspirin  chewable 81 milliGRAM(s) Oral daily  atorvastatin 10 milliGRAM(s) Oral at bedtime  clopidogrel Tablet 75 milliGRAM(s) Oral daily  dextrose 5%. 1000 milliLiter(s) (50 mL/Hr) IV Continuous <Continuous>  dextrose 50% Injectable 12.5 Gram(s) IV Push once  dextrose 50% Injectable 25 Gram(s) IV Push once  dextrose 50% Injectable 25 Gram(s) IV Push once  DULoxetine 30 milliGRAM(s) Oral daily  enoxaparin Injectable 40 milliGRAM(s) SubCutaneous daily  gabapentin 600 milliGRAM(s) Oral two times a day  hydrochlorothiazide 12.5 milliGRAM(s) Oral daily  insulin glargine Injectable (LANTUS) 8 Unit(s) SubCutaneous at bedtime  insulin lispro (HumaLOG) corrective regimen sliding scale   SubCutaneous three times a day before meals  insulin lispro (HumaLOG) corrective regimen sliding scale   SubCutaneous at bedtime  lidocaine   Patch 1 Patch Transdermal daily  lidocaine 4% Topical Solution 1 Application(s) Topical two times a day  omega-3-Acid Ethyl Esters 2 Gram(s) Oral daily  oxybutynin 10 milliGRAM(s) Oral two times a day  pantoprazole    Tablet 40 milliGRAM(s) Oral before breakfast    MEDICATIONS  (PRN):  dextrose 40% Gel 15 Gram(s) Oral once PRN Blood Glucose LESS THAN 70 milliGRAM(s)/deciliter  glucagon  Injectable 1 milliGRAM(s) IntraMuscular once PRN Glucose LESS THAN 70 milligrams/deciliter  oxyCODONE    IR 5 milliGRAM(s) Oral every 4 hours PRN Moderate Pain (4 - 6) and severe pain (7-10)      Vital Signs Last 24 Hrs  T(C): 37.2 (24 Dec 2019 12:58), Max: 37.2 (24 Dec 2019 12:58)  T(F): 98.9 (24 Dec 2019 12:58), Max: 98.9 (24 Dec 2019 12:58)  HR: 77 (24 Dec 2019 12:58) (71 - 78)  BP: 146/71 (24 Dec 2019 12:58) (128/57 - 147/65)  BP(mean): --  RR: 18 (24 Dec 2019 12:58) (17 - 19)  SpO2: 95% (24 Dec 2019 12:58) (95% - 97%)  CAPILLARY BLOOD GLUCOSE      POCT Blood Glucose.: 203 mg/dL (24 Dec 2019 12:19)  POCT Blood Glucose.: 178 mg/dL (24 Dec 2019 08:21)  POCT Blood Glucose.: 218 mg/dL (23 Dec 2019 22:21)  POCT Blood Glucose.: 183 mg/dL (23 Dec 2019 17:19)    I&O's Summary    PHYSICAL EXAM:  GENERAL: NAD, obese   CHEST/LUNG: Clear to auscultation bilaterally; No wheeze  HEART: Regular rate and rhythm; No murmurs, rubs, or gallops  ABDOMEN: Soft, Nontender, Nondistended; Bowel sounds present  EXTREMITIES:   warm and well perfused, No clubbing, cyanosis, or edema  PSYCH: AAOx3  NEUROLOGY: non-focal  SKIN: No rashes or lesions        LABS:        no new labs             RADIOLOGY & ADDITIONAL TESTS:    Imaging Personally Reviewed:    Consultant(s) Notes Reviewed:      Care Discussed with Consultants/Other Providers:

## 2019-12-24 NOTE — PROGRESS NOTE ADULT - PROBLEM SELECTOR PLAN 1
Pt presents with acute on chronic left knee pain. No recent trauma. No joint erythema on exam, point tenderness in posterior knee. No joint laxity. No signs of infection. Due to underlying OA. Xray reviewed. Small joint effusion, no bony pathology. US results reviewed, negative for DVT  - recent stent placement prohibits NSAID use. therefore pain control with standing tylenol 1000mg q8h  - oxycodone prn; cannot tolerate tramadol due to severe nausea/vomiting  - trial of duloxetine 30mg daily x7 days. If tolerable, recommend increasing to 60mg daily  -If no improvement, consider repeat steroid injections, which can be done as an outpatient  -PT eval complete --> pt deemed candidate for home PT but  unable to do stairs on 12/23, PT rec [facility]  - OP ortho f/u

## 2019-12-24 NOTE — PROGRESS NOTE ADULT - ASSESSMENT
70 yo F with morbid obesity, HTN, HLD, DM2, CAD s/p stent 11/26 who reports acute on chronic L knee pain, found to have moderate L knee effusion in the setting of moderate osteoarthritis admitted as cannot ambulate up her stairs due to pain.

## 2019-12-24 NOTE — PROGRESS NOTE ADULT - REASON FOR ADMISSION
Discharge: 
 
Patient will discharge home today (2/10/2019). Patient's home health will be restarted with [facility]. Patient's transport has been arranged through [facility] for a 4PM  time. There are no other care manager concerns regarding this discharge. This writer will continue to closely monitor for discharge planning until patient has officially discharged home from [facility]. Lamonte Garcia. MS Care Manager Pager#: [phone]
L knee pain, OA
knee pain
knee pain

## 2019-12-24 NOTE — DISCHARGE NOTE NURSING/CASE MANAGEMENT/SOCIAL WORK - PATIENT PORTAL LINK FT
You can access the FollowMyHealth Patient Portal offered by [facility] by registering at the following website: [email or web address]. By joining [facility]’s FollowMyHealth portal, you will also be able to view your health information using other applications (apps) compatible with our system.

## 2019-12-24 NOTE — PROGRESS NOTE ADULT - PROBLEM SELECTOR PLAN 8
1.  Name of PCP: Dr. Adam David  2.  PCP Contacted on Admission: [ ] Y    [ X] N    3.  PCP contacted at Discharge: [ X] Y    [ ] N    [ ] N/A called 12/23 .message left with office staff again on 12/24 with call back number   4.  Post-Discharge Appointment Date and Location:  going to [facility]  5.  Summary of Handoff given to PCP: called 12/23 advised of reason for admission and possible dc to [facility] 1.  Name of PCP: Dr. Adam David  2.  PCP Contacted on Admission: [ ] Y    [ X] N    3.  PCP contacted at Discharge: [ X] Y    [ ] N    [ ] N/A, spoke to PMD DR David   4.  Post-Discharge Appointment Date and Location:  going to [facility]  5.  Summary of Handoff given to PCP: called 12/24 advised of reason for admission and possible dc to [facility]

## 2020-11-10 NOTE — PATIENT PROFILE ADULT - LIVING ENVIRONMENT
Called patient no answer left voice mail to have patient return call to discuss test results and treatment plan.   no

## 2020-12-15 ENCOUNTER — APPOINTMENT (OUTPATIENT)
Dept: VASCULAR SURGERY | Facility: CLINIC | Age: 70
End: 2020-12-15
Payer: MEDICARE

## 2020-12-15 ENCOUNTER — APPOINTMENT (OUTPATIENT)
Dept: VASCULAR SURGERY | Facility: CLINIC | Age: 70
End: 2020-12-15

## 2020-12-15 VITALS
HEIGHT: 68 IN | BODY MASS INDEX: 38.65 KG/M2 | DIASTOLIC BLOOD PRESSURE: 83 MMHG | TEMPERATURE: 96.4 F | WEIGHT: 255 LBS | HEART RATE: 70 BPM | SYSTOLIC BLOOD PRESSURE: 142 MMHG

## 2020-12-15 DIAGNOSIS — I83.893 VARICOSE VEINS OF BILATERAL LOWER EXTREMITIES WITH OTHER COMPLICATIONS: ICD-10-CM

## 2020-12-15 DIAGNOSIS — I87.2 VENOUS INSUFFICIENCY (CHRONIC) (PERIPHERAL): ICD-10-CM

## 2020-12-15 PROCEDURE — 93970 EXTREMITY STUDY: CPT

## 2020-12-15 PROCEDURE — 99214 OFFICE O/P EST MOD 30 MIN: CPT

## 2020-12-15 NOTE — ASSESSMENT
[Arterial/Venous Disease] : arterial/venous disease [Other: _____] : [unfilled] [Foot care/Footwear] : foot care/footwear [FreeTextEntry1] : Impression venous insuff  w sx \par \par [facility] management   leg elevation, knee high comp stockings 20-30mm Hg, wt loss, diet control \par ov 12mo dec 2021 to re eval \par \par \par letter faxed to Dr JAEL Ryder DPM \par \par Varicose veins are enlarged, twisted veins. Varicose veins are caused by increased blood pressure in the veins.  The blood moves towards the heart by 1-way valves in the veins. When the valves become weakened or damaged, blood can collect in the veins and pool in your lower legs (ankles). This causes the veins to become enlarged and incompetent with reflux. Sitting or standing for long periods can cause blood to pool in the leg veins, increasing the pressure within the veins. \par Risk factors for varicose veins or venous disease may include:  obesity, older age, standing or sitting for prolonged periods of time for several years, being female, pregnancy, taking oral contraceptive pills or hormone replacement, being inactive, and/or smoking. \par The most common symptoms of varicose veins are sensations in the legs, such as a heavy feeling, burning, and/or aching. However, each individual may experience symptoms differently.  Other symptoms may include:  color changes in the skin, sores on the legs, or rash.  Severe varicose veins or venous disease may eventually produce long-term mild swelling that can result in more serious skin and tissue problems, such as ulcers and non-healing sores.\par Varicose veins and venous disease are diagnosed by a complete medical history, physical examination, and diagnostic studies for varicose veins including duplex ultrasound and color-flow imaging.  \par Medical treatment for varicose veins and venous disease include:  compression stockings, sclerotherapy, endovenous ablation and/or surgical treatment with microphlebectomy.  \par \par

## 2020-12-15 NOTE — PHYSICAL EXAM
[Normal Breath Sounds] : Normal breath sounds [1+] : left 1+ [2+] : left 2+ [Ankle Swelling (On Exam)] : present [Ankle Swelling Bilaterally] : bilaterally  [Varicose Veins Of Lower Extremities] : bilaterally [] : bilaterally [Ankle Swelling On The Right] : mild [No HSM] : no hepatosplenomegaly [No Rash or Lesion] : No rash or lesion [Alert] : alert [Oriented to Person] : oriented to person [Oriented to Place] : oriented to place [Oriented to Time] : oriented to time [Calm] : calm [Ankle Swelling On The Left] : moderate [JVD] : no jugular venous distention  [Abdomen Masses] : No abdominal masses [Tender] : was nontender [Stool Sample Taken] : No stool obtained  on rectal exam [de-identified] : nad [de-identified] : wnl [FreeTextEntry1] : mild venous insuff  w mild st dermatitis\par mod edema\par remi le multiple small v veins and spider v  remi ant thigh , calf and shins 1-2mm  [de-identified] : wnl [de-identified] : wnl [de-identified] : cn 2-12 remi grossly intact [de-identified] : cooperative

## 2020-12-15 NOTE — DATA REVIEWED
[FreeTextEntry1] : 3/5/2018 venous Doppler  LLE no acute dvt svt,  LLE sig GSV insuff  in prox calf only \par \par 12/15/2020 Venous Doppler Cl LE  no acute dvt svt \par                            RLE  no sono evidence of reflux\par                            LLE GSV insuff proximal calf level only w insuff collaterals\par \par

## 2021-09-15 ENCOUNTER — APPOINTMENT (OUTPATIENT)
Dept: ORTHOPEDIC SURGERY | Facility: CLINIC | Age: 71
End: 2021-09-15
Payer: MEDICARE

## 2021-09-15 VITALS — HEIGHT: 68 IN | BODY MASS INDEX: 36.37 KG/M2 | WEIGHT: 240 LBS

## 2021-09-15 DIAGNOSIS — M47.817 SPONDYLOSIS W/OUT MYELOPATHY OR RADICULOPATHY, LUMBOSACRAL REGION: ICD-10-CM

## 2021-09-15 DIAGNOSIS — M51.36 OTHER INTERVERTEBRAL DISC DEGENERATION, LUMBAR REGION: ICD-10-CM

## 2021-09-15 DIAGNOSIS — M43.16 SPONDYLOLISTHESIS, LUMBAR REGION: ICD-10-CM

## 2021-09-15 PROCEDURE — 99204 OFFICE O/P NEW MOD 45 MIN: CPT

## 2021-09-15 PROCEDURE — 72100 X-RAY EXAM L-S SPINE 2/3 VWS: CPT

## 2021-12-21 ENCOUNTER — APPOINTMENT (OUTPATIENT)
Dept: VASCULAR SURGERY | Facility: CLINIC | Age: 71
End: 2021-12-21

## 2022-02-08 NOTE — PATIENT PROFILE ADULT - DO YOU FEEL LIKE HURTING YOURSELF OR OTHERS?
CM reviewed clinical record. Pt to be discharged to [facility] 2/9 if medically stable. CM will continue to follow to assist and implement identified discharge needs. no

## 2023-06-04 ENCOUNTER — INPATIENT (INPATIENT)
Facility: HOSPITAL | Age: 73
LOS: 2 days | Discharge: HOME | End: 2023-06-07
Attending: INTERNAL MEDICINE | Admitting: INTERNAL MEDICINE
Payer: MEDICARE

## 2023-06-04 VITALS
DIASTOLIC BLOOD PRESSURE: 72 MMHG | TEMPERATURE: 98 F | HEART RATE: 94 BPM | OXYGEN SATURATION: 100 % | SYSTOLIC BLOOD PRESSURE: 156 MMHG | RESPIRATION RATE: 20 BRPM

## 2023-06-04 DIAGNOSIS — Z90.49 ACQUIRED ABSENCE OF OTHER SPECIFIED PARTS OF DIGESTIVE TRACT: Chronic | ICD-10-CM

## 2023-06-04 DIAGNOSIS — Z98.890 OTHER SPECIFIED POSTPROCEDURAL STATES: Chronic | ICD-10-CM

## 2023-06-04 DIAGNOSIS — Z98.891 HISTORY OF UTERINE SCAR FROM PREVIOUS SURGERY: Chronic | ICD-10-CM

## 2023-06-04 LAB
ALBUMIN SERPL ELPH-MCNC: 4.2 G/DL — SIGNIFICANT CHANGE UP (ref 3.3–5)
ALP SERPL-CCNC: 102 U/L — SIGNIFICANT CHANGE UP (ref 40–120)
ALT FLD-CCNC: 24 U/L — SIGNIFICANT CHANGE UP (ref 4–33)
ANION GAP SERPL CALC-SCNC: 16 MMOL/L — HIGH (ref 7–14)
APPEARANCE UR: CLEAR — SIGNIFICANT CHANGE UP
APTT BLD: 27.2 SEC — SIGNIFICANT CHANGE UP (ref 27–36.3)
APTT BLD: >200 SEC — CRITICAL HIGH (ref 27–36.3)
AST SERPL-CCNC: 24 U/L — SIGNIFICANT CHANGE UP (ref 4–32)
B PERT DNA SPEC QL NAA+PROBE: SIGNIFICANT CHANGE UP
B PERT+PARAPERT DNA PNL SPEC NAA+PROBE: SIGNIFICANT CHANGE UP
B-OH-BUTYR SERPL-SCNC: 0.3 MMOL/L — SIGNIFICANT CHANGE UP (ref 0–0.4)
BACTERIA # UR AUTO: NEGATIVE — SIGNIFICANT CHANGE UP
BASE EXCESS BLDV CALC-SCNC: 3.9 MMOL/L — HIGH (ref -2–3)
BASOPHILS # BLD AUTO: 0.05 K/UL — SIGNIFICANT CHANGE UP (ref 0–0.2)
BASOPHILS NFR BLD AUTO: 0.5 % — SIGNIFICANT CHANGE UP (ref 0–2)
BILIRUB SERPL-MCNC: 0.4 MG/DL — SIGNIFICANT CHANGE UP (ref 0.2–1.2)
BILIRUB UR-MCNC: NEGATIVE — SIGNIFICANT CHANGE UP
BLOOD GAS VENOUS COMPREHENSIVE RESULT: SIGNIFICANT CHANGE UP
BORDETELLA PARAPERTUSSIS (RAPRVP): SIGNIFICANT CHANGE UP
BUN SERPL-MCNC: 23 MG/DL — SIGNIFICANT CHANGE UP (ref 7–23)
C PNEUM DNA SPEC QL NAA+PROBE: SIGNIFICANT CHANGE UP
CALCIUM SERPL-MCNC: 9.8 MG/DL — SIGNIFICANT CHANGE UP (ref 8.4–10.5)
CHLORIDE BLDV-SCNC: 90 MMOL/L — LOW (ref 96–108)
CHLORIDE SERPL-SCNC: 87 MMOL/L — LOW (ref 98–107)
CO2 BLDV-SCNC: 30.6 MMOL/L — HIGH (ref 22–26)
CO2 SERPL-SCNC: 26 MMOL/L — SIGNIFICANT CHANGE UP (ref 22–31)
COLOR SPEC: SIGNIFICANT CHANGE UP
CREAT SERPL-MCNC: 1.04 MG/DL — SIGNIFICANT CHANGE UP (ref 0.5–1.3)
DIFF PNL FLD: NEGATIVE — SIGNIFICANT CHANGE UP
EGFR: 57 ML/MIN/1.73M2 — LOW
EOSINOPHIL # BLD AUTO: 0.16 K/UL — SIGNIFICANT CHANGE UP (ref 0–0.5)
EOSINOPHIL NFR BLD AUTO: 1.7 % — SIGNIFICANT CHANGE UP (ref 0–6)
EPI CELLS # UR: 1 /HPF — SIGNIFICANT CHANGE UP (ref 0–5)
FLUAV SUBTYP SPEC NAA+PROBE: SIGNIFICANT CHANGE UP
FLUBV RNA SPEC QL NAA+PROBE: SIGNIFICANT CHANGE UP
GAS PNL BLDV: 126 MMOL/L — LOW (ref 136–145)
GAS PNL BLDV: SIGNIFICANT CHANGE UP
GLUCOSE BLDV-MCNC: 568 MG/DL — CRITICAL HIGH (ref 70–99)
GLUCOSE SERPL-MCNC: 574 MG/DL — CRITICAL HIGH (ref 70–99)
GLUCOSE UR QL: ABNORMAL
HADV DNA SPEC QL NAA+PROBE: SIGNIFICANT CHANGE UP
HCO3 BLDV-SCNC: 29 MMOL/L — SIGNIFICANT CHANGE UP (ref 22–29)
HCOV 229E RNA SPEC QL NAA+PROBE: SIGNIFICANT CHANGE UP
HCOV HKU1 RNA SPEC QL NAA+PROBE: SIGNIFICANT CHANGE UP
HCOV NL63 RNA SPEC QL NAA+PROBE: SIGNIFICANT CHANGE UP
HCOV OC43 RNA SPEC QL NAA+PROBE: SIGNIFICANT CHANGE UP
HCT VFR BLD CALC: 43.7 % — SIGNIFICANT CHANGE UP (ref 34.5–45)
HCT VFR BLDA CALC: 45 % — SIGNIFICANT CHANGE UP (ref 34.5–46.5)
HGB BLD CALC-MCNC: 15.1 G/DL — SIGNIFICANT CHANGE UP (ref 11.7–16.1)
HGB BLD-MCNC: 14.7 G/DL — SIGNIFICANT CHANGE UP (ref 11.5–15.5)
HMPV RNA SPEC QL NAA+PROBE: SIGNIFICANT CHANGE UP
HPIV1 RNA SPEC QL NAA+PROBE: SIGNIFICANT CHANGE UP
HPIV2 RNA SPEC QL NAA+PROBE: SIGNIFICANT CHANGE UP
HPIV3 RNA SPEC QL NAA+PROBE: SIGNIFICANT CHANGE UP
HPIV4 RNA SPEC QL NAA+PROBE: SIGNIFICANT CHANGE UP
HYALINE CASTS # UR AUTO: 0 /LPF — SIGNIFICANT CHANGE UP (ref 0–7)
IANC: 8.38 K/UL — HIGH (ref 1.8–7.4)
IMM GRANULOCYTES NFR BLD AUTO: 0.5 % — SIGNIFICANT CHANGE UP (ref 0–0.9)
INR BLD: 0.98 RATIO — SIGNIFICANT CHANGE UP (ref 0.88–1.16)
INR BLD: 1.47 RATIO — HIGH (ref 0.88–1.16)
KETONES UR-MCNC: ABNORMAL
LACTATE BLDV-MCNC: 2.8 MMOL/L — HIGH (ref 0.5–2)
LACTATE SERPL-SCNC: 1.4 MMOL/L — SIGNIFICANT CHANGE UP (ref 0.5–2)
LACTATE SERPL-SCNC: 2.2 MMOL/L — HIGH (ref 0.5–2)
LEUKOCYTE ESTERASE UR-ACNC: NEGATIVE — SIGNIFICANT CHANGE UP
LYMPHOCYTES # BLD AUTO: 0.51 K/UL — LOW (ref 1–3.3)
LYMPHOCYTES # BLD AUTO: 5.4 % — LOW (ref 13–44)
M PNEUMO DNA SPEC QL NAA+PROBE: SIGNIFICANT CHANGE UP
MAGNESIUM SERPL-MCNC: 1.7 MG/DL — SIGNIFICANT CHANGE UP (ref 1.6–2.6)
MCHC RBC-ENTMCNC: 29.2 PG — SIGNIFICANT CHANGE UP (ref 27–34)
MCHC RBC-ENTMCNC: 33.6 GM/DL — SIGNIFICANT CHANGE UP (ref 32–36)
MCV RBC AUTO: 86.7 FL — SIGNIFICANT CHANGE UP (ref 80–100)
MONOCYTES # BLD AUTO: 0.37 K/UL — SIGNIFICANT CHANGE UP (ref 0–0.9)
MONOCYTES NFR BLD AUTO: 3.9 % — SIGNIFICANT CHANGE UP (ref 2–14)
NEUTROPHILS # BLD AUTO: 8.38 K/UL — HIGH (ref 1.8–7.4)
NEUTROPHILS NFR BLD AUTO: 88 % — HIGH (ref 43–77)
NITRITE UR-MCNC: NEGATIVE — SIGNIFICANT CHANGE UP
NRBC # BLD: 0 /100 WBCS — SIGNIFICANT CHANGE UP (ref 0–0)
NRBC # FLD: 0 K/UL — SIGNIFICANT CHANGE UP (ref 0–0)
PCO2 BLDV: 45 MMHG — SIGNIFICANT CHANGE UP (ref 39–52)
PH BLDV: 7.42 — SIGNIFICANT CHANGE UP (ref 7.32–7.43)
PH UR: 6 — SIGNIFICANT CHANGE UP (ref 5–8)
PLATELET # BLD AUTO: 220 K/UL — SIGNIFICANT CHANGE UP (ref 150–400)
PO2 BLDV: 32 MMHG — SIGNIFICANT CHANGE UP (ref 25–45)
POTASSIUM BLDV-SCNC: 4.2 MMOL/L — SIGNIFICANT CHANGE UP (ref 3.5–5.1)
POTASSIUM SERPL-MCNC: 4.2 MMOL/L — SIGNIFICANT CHANGE UP (ref 3.5–5.3)
POTASSIUM SERPL-SCNC: 4.2 MMOL/L — SIGNIFICANT CHANGE UP (ref 3.5–5.3)
PROCALCITONIN SERPL-MCNC: 0.35 NG/ML — HIGH (ref 0.02–0.1)
PROT SERPL-MCNC: 7.2 G/DL — SIGNIFICANT CHANGE UP (ref 6–8.3)
PROT UR-MCNC: ABNORMAL
PROTHROM AB SERPL-ACNC: 11.4 SEC — SIGNIFICANT CHANGE UP (ref 10.5–13.4)
PROTHROM AB SERPL-ACNC: 17.1 SEC — HIGH (ref 10.5–13.4)
RAPID RVP RESULT: SIGNIFICANT CHANGE UP
RBC # BLD: 5.04 M/UL — SIGNIFICANT CHANGE UP (ref 3.8–5.2)
RBC # FLD: 12.9 % — SIGNIFICANT CHANGE UP (ref 10.3–14.5)
RBC CASTS # UR COMP ASSIST: 2 /HPF — SIGNIFICANT CHANGE UP (ref 0–4)
RSV RNA SPEC QL NAA+PROBE: SIGNIFICANT CHANGE UP
RV+EV RNA SPEC QL NAA+PROBE: SIGNIFICANT CHANGE UP
SAO2 % BLDV: 54.4 % — LOW (ref 67–88)
SARS-COV-2 RNA SPEC QL NAA+PROBE: SIGNIFICANT CHANGE UP
SODIUM SERPL-SCNC: 129 MMOL/L — LOW (ref 135–145)
SP GR SPEC: 1.04 — SIGNIFICANT CHANGE UP (ref 1.01–1.05)
UROBILINOGEN FLD QL: SIGNIFICANT CHANGE UP
WBC # BLD: 9.52 K/UL — SIGNIFICANT CHANGE UP (ref 3.8–10.5)
WBC # FLD AUTO: 9.52 K/UL — SIGNIFICANT CHANGE UP (ref 3.8–10.5)
WBC UR QL: 9 /HPF — HIGH (ref 0–5)

## 2023-06-04 PROCEDURE — 62270 DX LMBR SPI PNXR: CPT

## 2023-06-04 PROCEDURE — 71045 X-RAY EXAM CHEST 1 VIEW: CPT | Mod: 26

## 2023-06-04 PROCEDURE — 99285 EMERGENCY DEPT VISIT HI MDM: CPT | Mod: 25,GC

## 2023-06-04 PROCEDURE — 70450 CT HEAD/BRAIN W/O DYE: CPT | Mod: 26,MA

## 2023-06-04 RX ORDER — INSULIN LISPRO 100/ML
5 VIAL (ML) SUBCUTANEOUS ONCE
Refills: 0 | Status: COMPLETED | OUTPATIENT
Start: 2023-06-04 | End: 2023-06-04

## 2023-06-04 RX ORDER — SODIUM CHLORIDE 9 MG/ML
1000 INJECTION, SOLUTION INTRAVENOUS ONCE
Refills: 0 | Status: COMPLETED | OUTPATIENT
Start: 2023-06-04 | End: 2023-06-04

## 2023-06-04 RX ORDER — ACETAMINOPHEN 500 MG
1000 TABLET ORAL ONCE
Refills: 0 | Status: COMPLETED | OUTPATIENT
Start: 2023-06-04 | End: 2023-06-04

## 2023-06-04 RX ADMIN — Medication 5 UNIT(S): at 21:26

## 2023-06-04 RX ADMIN — SODIUM CHLORIDE 1000 MILLILITER(S): 9 INJECTION, SOLUTION INTRAVENOUS at 18:26

## 2023-06-04 RX ADMIN — Medication 400 MILLIGRAM(S): at 22:02

## 2023-06-04 RX ADMIN — Medication 1000 MILLIGRAM(S): at 22:13

## 2023-06-04 RX ADMIN — SODIUM CHLORIDE 1000 MILLILITER(S): 9 INJECTION, SOLUTION INTRAVENOUS at 16:23

## 2023-06-04 NOTE — ED PROVIDER NOTE - CLINICAL SUMMARY MEDICAL DECISION MAKING FREE TEXT BOX
71 yo F PMHx of HTN, HLD, DM, coronary artery disease with stents, presenting to the emergency department for altered mental status since 9:30 AM today.  Daughter at bedside who lives with patient.  States that overnight patient was becoming increasingly confused.  Patient recently evaluated by her primary care doctor and found to have abnormal blood work per daughter.  After that visit patient was started on Macrobid for potential UTI.  Patient took 2 doses yesterday.  Patient currently alternating only able to provide her own name reliably.  She follows commands appropriately.  Daughter says that at baseline patient is able to ambulate without assistance and do not is alert and oriented x3.  Patient currently without complaints and daughter states that patient has not been complaining of anything at home.  Daughter states that patient has been incontinent of urine.  No changes in bowel movements, no nausea or vomiting, no fevers, cough or congestion, no chest pain or shortness of breath, no swelling in legs, skin.  Daughter denies history of similar episodes.  Daughter unable to provide patient's current home medications, and past medical history is limited. Exam as above. Presentation largely consistent with metabolic encephalopathy given lack of neurological deficits.  Consider electrolyte abnormality, hyperglycemia, HHS, hypovolemia/dehydration, JHON, occult infection, UTI, pyelo, pna, viral illness, medication side effect, anemia. Labs, imaging, IVF, will reassess. Will require admission for ongoing management.

## 2023-06-04 NOTE — ED ADULT TRIAGE NOTE - CHIEF COMPLAINT QUOTE
Pt arrives from home. Pt with altered mental status today. Pt recently started on Macrobid yesterday for a UTI. Pt also had her diabetes medication readjusted recently. Pt arrives from home. Pt with altered mental status today. Pt recently started on Macrobid yesterday for a UTI. Pt also had her diabetes medication readjusted recently. FS in the 500's.

## 2023-06-04 NOTE — ED PROVIDER NOTE - OBJECTIVE STATEMENT
71 yo F PMHx of HTN, HLD, DM, coronary artery disease with stents, presenting to the emergency department for altered mental status since 9:30 AM today.  Daughter at bedside who lives with patient.  States that overnight patient was becoming increasingly confused.  Patient recently evaluated by her primary care doctor and found to have abnormal blood work per daughter.  After that visit patient was started on Macrobid for potential UTI.  Patient took 2 doses yesterday.  Patient currently alternating only able to provide her own name reliably.  She follows commands appropriately.  Daughter says that at baseline patient is able to ambulate without assistance and do not is alert and oriented x3.  Patient currently without complaints and daughter states that patient has not been complaining of anything at home.  Daughter states that patient has been incontinent of urine.  No changes in bowel movements, no nausea or vomiting, no fevers, cough or congestion, no chest pain or shortness of breath, no swelling in legs, skin.  Daughter denies history of similar episodes.  Daughter unable to provide patient's current home medications, and past medical history is limited.

## 2023-06-04 NOTE — ED PROVIDER NOTE - PROGRESS NOTE DETAILS
HANSEN:  CT and infectious work-up negative including UA.  Patient hyperglycemic but not consistent with DKA, given subcutaneous insulin.  Patient rectally febrile.  Remains alert but confused, oriented to name only.  We will pursue lumbar puncture to evaluate for meningeal encephalitis.  Informed consent discussed with adult daughter at bedside who identifies as healthcare proxy. Jeramie, PGY3: LP unsuccessful likely from habitus. Will treat empirically for meningitis. Discussed w/ hospitalist; will admit to medicine

## 2023-06-04 NOTE — ED PROVIDER NOTE - PHYSICAL EXAMINATION
Gen: Alert. Ox1. NAD.   HEENT: Atraumatic. Mucous membranes dry.   CV: RRR. No significant LE edema.   Resp: Unlabored-respirations. CTAB.  GI: Abdomen non tender to palpation, soft.  Skin/MSK: No open wounds appreciated. No meningismus.   Neuro: EOMI. Pupils ERRL. Following commands, although slow to respond. Moving extremities x 4 spontaneously and sensation intact throughout.   Psych: Confused.

## 2023-06-04 NOTE — ED ADULT NURSE NOTE - OBJECTIVE STATEMENT
AMS that began this morning with lethargy. PT unable to take part in assessment due to AMS. Daughter at bedside to provide Hx. Breathing equal and unlabored. IV to left a/c#20, labs sent

## 2023-06-04 NOTE — ED ADULT NURSE NOTE - CHIEF COMPLAINT QUOTE
Pt arrives from home. Pt with altered mental status today. Pt recently started on Macrobid yesterday for a UTI. Pt also had her diabetes medication readjusted recently. FS in the 500's.

## 2023-06-04 NOTE — ED PROVIDER NOTE - ATTENDING CONTRIBUTION TO CARE
Brief HPI:  72-year-old female past medical history of hypertension, diabetes, hyperlipidemia, CAD with stents presents for altered mental status since 930 today.  Patient provides limited history, adult daughter at bedside provided significant history.  Per daughter, patient was recently having urinary frequency, she saw her PCP 1 day ago who prescribed her Macrobid for presumed UTI however no testing was performed.  The patient's baseline mental status is ANO x3 and she is usually able to ambulate without assistance, however currently she is more confused and having decreased alertness.  Daughter denies patient having fever, chills, nausea, vomiting, headache, abdominal pain, trauma.  Denies alcohol or illicit drug use.    Vitals:   Reviewed    Exam:    GEN:  Decreased alertness, speaking short sentences, responsive to verbal stimuli  HEENT:  NCAT, neck supple, EOMI, PERRLA, sclera anicteric, no conjunctival pallor or injection, no stridor, normal voice, no tonsillar exudate, uvula midline  CV:  regular rhythm and rate, s1/s2 audible, no murmurs, rubs or gallops, peripheral pulses 2+ and symmetric  PULM:  non-labored respirations, lungs clear to auscultation bilaterally, no wheezes, crackles or rales  ABD:  non distended, non-tender, no rebound, no guarding, negative Schmidt's sign, bowel sounds normal, no cvat  MSK:  no gross deformity, non-tender extremities and joints, range of motion grossly normal appearing, no extremity edema, extremities warm and well perfused   NEURO:  CN II-XII intact, motor 5/5 in upper and lower extremities bilaterally, sensation grossly intact in extremities and trunk, finger to nose testing wnl, no nystagmus, negative Romberg, no pronator drift  SKIN:  warm, dry, no rash or vesicles     A/P:  72-year-old female past medical history of hypertension, diabetes, hyperlipidemia, CAD with stents presents for altered mental status since 930 today.  Hyperglycemic.  Vital signs stable, afebrile.  Exam with decreased alertness but patient is following commands and protecting airway.  No focal findings of bacterial infection on exam.  Neuro exam is nonfocal.  Possible metabolic derangement, DKA, UTI.  Will send labs, head CT, supportive care.  Disposition pending.

## 2023-06-05 DIAGNOSIS — Z29.9 ENCOUNTER FOR PROPHYLACTIC MEASURES, UNSPECIFIED: ICD-10-CM

## 2023-06-05 DIAGNOSIS — G93.41 METABOLIC ENCEPHALOPATHY: ICD-10-CM

## 2023-06-05 DIAGNOSIS — R41.82 ALTERED MENTAL STATUS, UNSPECIFIED: ICD-10-CM

## 2023-06-05 DIAGNOSIS — E78.5 HYPERLIPIDEMIA, UNSPECIFIED: ICD-10-CM

## 2023-06-05 DIAGNOSIS — E11.9 TYPE 2 DIABETES MELLITUS WITHOUT COMPLICATIONS: ICD-10-CM

## 2023-06-05 DIAGNOSIS — I10 ESSENTIAL (PRIMARY) HYPERTENSION: ICD-10-CM

## 2023-06-05 LAB
A1C WITH ESTIMATED AVERAGE GLUCOSE RESULT: 13.7 % — HIGH (ref 4–5.6)
ALBUMIN SERPL ELPH-MCNC: 3.5 G/DL — SIGNIFICANT CHANGE UP (ref 3.3–5)
ALP SERPL-CCNC: 82 U/L — SIGNIFICANT CHANGE UP (ref 40–120)
ALT FLD-CCNC: 26 U/L — SIGNIFICANT CHANGE UP (ref 4–33)
AMPHET UR-MCNC: NEGATIVE — SIGNIFICANT CHANGE UP
ANION GAP SERPL CALC-SCNC: 13 MMOL/L — SIGNIFICANT CHANGE UP (ref 7–14)
AST SERPL-CCNC: 44 U/L — HIGH (ref 4–32)
BARBITURATES UR SCN-MCNC: NEGATIVE — SIGNIFICANT CHANGE UP
BASOPHILS # BLD AUTO: 0.05 K/UL — SIGNIFICANT CHANGE UP (ref 0–0.2)
BASOPHILS NFR BLD AUTO: 0.7 % — SIGNIFICANT CHANGE UP (ref 0–2)
BENZODIAZ UR-MCNC: NEGATIVE — SIGNIFICANT CHANGE UP
BILIRUB SERPL-MCNC: 0.2 MG/DL — SIGNIFICANT CHANGE UP (ref 0.2–1.2)
BUN SERPL-MCNC: 20 MG/DL — SIGNIFICANT CHANGE UP (ref 7–23)
CALCIUM SERPL-MCNC: 8.6 MG/DL — SIGNIFICANT CHANGE UP (ref 8.4–10.5)
CHLORIDE SERPL-SCNC: 95 MMOL/L — LOW (ref 98–107)
CHOLEST SERPL-MCNC: 133 MG/DL — SIGNIFICANT CHANGE UP
CK MB BLD-MCNC: 0.9 % — SIGNIFICANT CHANGE UP (ref 0–2.5)
CK MB CFR SERPL CALC: 9.5 NG/ML — HIGH
CK SERPL-CCNC: 1032 U/L — HIGH (ref 25–170)
CO2 SERPL-SCNC: 26 MMOL/L — SIGNIFICANT CHANGE UP (ref 22–31)
COCAINE METAB.OTHER UR-MCNC: NEGATIVE — SIGNIFICANT CHANGE UP
CREAT SERPL-MCNC: 0.98 MG/DL — SIGNIFICANT CHANGE UP (ref 0.5–1.3)
CREATININE URINE RESULT, DAU: 51 MG/DL — SIGNIFICANT CHANGE UP
EGFR: 61 ML/MIN/1.73M2 — SIGNIFICANT CHANGE UP
EOSINOPHIL # BLD AUTO: 0.1 K/UL — SIGNIFICANT CHANGE UP (ref 0–0.5)
EOSINOPHIL NFR BLD AUTO: 1.3 % — SIGNIFICANT CHANGE UP (ref 0–6)
ESTIMATED AVERAGE GLUCOSE: 346 — SIGNIFICANT CHANGE UP
GLUCOSE BLDC GLUCOMTR-MCNC: 285 MG/DL — HIGH (ref 70–99)
GLUCOSE BLDC GLUCOMTR-MCNC: 305 MG/DL — HIGH (ref 70–99)
GLUCOSE BLDC GLUCOMTR-MCNC: 383 MG/DL — HIGH (ref 70–99)
GLUCOSE BLDC GLUCOMTR-MCNC: 399 MG/DL — HIGH (ref 70–99)
GLUCOSE BLDC GLUCOMTR-MCNC: 410 MG/DL — HIGH (ref 70–99)
GLUCOSE BLDC GLUCOMTR-MCNC: 444 MG/DL — HIGH (ref 70–99)
GLUCOSE BLDC GLUCOMTR-MCNC: 465 MG/DL — CRITICAL HIGH (ref 70–99)
GLUCOSE SERPL-MCNC: 406 MG/DL — HIGH (ref 70–99)
HCT VFR BLD CALC: 40.4 % — SIGNIFICANT CHANGE UP (ref 34.5–45)
HDLC SERPL-MCNC: 31 MG/DL — LOW
HGB BLD-MCNC: 13.6 G/DL — SIGNIFICANT CHANGE UP (ref 11.5–15.5)
IANC: 5.92 K/UL — SIGNIFICANT CHANGE UP (ref 1.8–7.4)
IMM GRANULOCYTES NFR BLD AUTO: 0.8 % — SIGNIFICANT CHANGE UP (ref 0–0.9)
LIPID PNL WITH DIRECT LDL SERPL: 48 MG/DL — SIGNIFICANT CHANGE UP
LYMPHOCYTES # BLD AUTO: 0.78 K/UL — LOW (ref 1–3.3)
LYMPHOCYTES # BLD AUTO: 10.4 % — LOW (ref 13–44)
MAGNESIUM SERPL-MCNC: 1.6 MG/DL — SIGNIFICANT CHANGE UP (ref 1.6–2.6)
MAGNESIUM SERPL-MCNC: 1.6 MG/DL — SIGNIFICANT CHANGE UP (ref 1.6–2.6)
MCHC RBC-ENTMCNC: 29.1 PG — SIGNIFICANT CHANGE UP (ref 27–34)
MCHC RBC-ENTMCNC: 33.7 GM/DL — SIGNIFICANT CHANGE UP (ref 32–36)
MCV RBC AUTO: 86.5 FL — SIGNIFICANT CHANGE UP (ref 80–100)
METHADONE UR-MCNC: NEGATIVE — SIGNIFICANT CHANGE UP
MONOCYTES # BLD AUTO: 0.6 K/UL — SIGNIFICANT CHANGE UP (ref 0–0.9)
MONOCYTES NFR BLD AUTO: 8 % — SIGNIFICANT CHANGE UP (ref 2–14)
NEUTROPHILS # BLD AUTO: 5.92 K/UL — SIGNIFICANT CHANGE UP (ref 1.8–7.4)
NEUTROPHILS NFR BLD AUTO: 78.8 % — HIGH (ref 43–77)
NON HDL CHOLESTEROL: 102 MG/DL — SIGNIFICANT CHANGE UP
NRBC # BLD: 0 /100 WBCS — SIGNIFICANT CHANGE UP (ref 0–0)
NRBC # FLD: 0 K/UL — SIGNIFICANT CHANGE UP (ref 0–0)
OPIATES UR-MCNC: NEGATIVE — SIGNIFICANT CHANGE UP
OXYCODONE UR-MCNC: NEGATIVE — SIGNIFICANT CHANGE UP
PCP SPEC-MCNC: SIGNIFICANT CHANGE UP
PCP UR-MCNC: NEGATIVE — SIGNIFICANT CHANGE UP
PHOSPHATE SERPL-MCNC: 2.7 MG/DL — SIGNIFICANT CHANGE UP (ref 2.5–4.5)
PHOSPHATE SERPL-MCNC: 2.9 MG/DL — SIGNIFICANT CHANGE UP (ref 2.5–4.5)
PLATELET # BLD AUTO: 193 K/UL — SIGNIFICANT CHANGE UP (ref 150–400)
POTASSIUM SERPL-MCNC: 3.5 MMOL/L — SIGNIFICANT CHANGE UP (ref 3.5–5.3)
POTASSIUM SERPL-SCNC: 3.5 MMOL/L — SIGNIFICANT CHANGE UP (ref 3.5–5.3)
PROCALCITONIN SERPL-MCNC: 0.36 NG/ML — HIGH (ref 0.02–0.1)
PROT SERPL-MCNC: 6 G/DL — SIGNIFICANT CHANGE UP (ref 6–8.3)
RBC # BLD: 4.67 M/UL — SIGNIFICANT CHANGE UP (ref 3.8–5.2)
RBC # FLD: 13.1 % — SIGNIFICANT CHANGE UP (ref 10.3–14.5)
SODIUM SERPL-SCNC: 134 MMOL/L — LOW (ref 135–145)
THC UR QL: NEGATIVE — SIGNIFICANT CHANGE UP
TRIGL SERPL-MCNC: 270 MG/DL — HIGH
TROPONIN T, HIGH SENSITIVITY RESULT: 15 NG/L — SIGNIFICANT CHANGE UP
WBC # BLD: 7.51 K/UL — SIGNIFICANT CHANGE UP (ref 3.8–10.5)
WBC # FLD AUTO: 7.51 K/UL — SIGNIFICANT CHANGE UP (ref 3.8–10.5)

## 2023-06-05 PROCEDURE — 99223 1ST HOSP IP/OBS HIGH 75: CPT

## 2023-06-05 PROCEDURE — 12345: CPT | Mod: NC,GC

## 2023-06-05 PROCEDURE — 74176 CT ABD & PELVIS W/O CONTRAST: CPT | Mod: 26

## 2023-06-05 RX ORDER — GLUCAGON INJECTION, SOLUTION 0.5 MG/.1ML
1 INJECTION, SOLUTION SUBCUTANEOUS ONCE
Refills: 0 | Status: DISCONTINUED | OUTPATIENT
Start: 2023-06-05 | End: 2023-06-07

## 2023-06-05 RX ORDER — SODIUM CHLORIDE 9 MG/ML
1000 INJECTION, SOLUTION INTRAVENOUS
Refills: 0 | Status: DISCONTINUED | OUTPATIENT
Start: 2023-06-05 | End: 2023-06-06

## 2023-06-05 RX ORDER — ACETAMINOPHEN 500 MG
650 TABLET ORAL EVERY 6 HOURS
Refills: 0 | Status: DISCONTINUED | OUTPATIENT
Start: 2023-06-05 | End: 2023-06-07

## 2023-06-05 RX ORDER — GLUCAGON INJECTION, SOLUTION 0.5 MG/.1ML
1 INJECTION, SOLUTION SUBCUTANEOUS ONCE
Refills: 0 | Status: DISCONTINUED | OUTPATIENT
Start: 2023-06-05 | End: 2023-06-06

## 2023-06-05 RX ORDER — DULOXETINE HYDROCHLORIDE 30 MG/1
30 CAPSULE, DELAYED RELEASE ORAL DAILY
Refills: 0 | Status: DISCONTINUED | OUTPATIENT
Start: 2023-06-05 | End: 2023-06-07

## 2023-06-05 RX ORDER — DEXTROSE 50 % IN WATER 50 %
25 SYRINGE (ML) INTRAVENOUS ONCE
Refills: 0 | Status: DISCONTINUED | OUTPATIENT
Start: 2023-06-05 | End: 2023-06-07

## 2023-06-05 RX ORDER — ATORVASTATIN CALCIUM 80 MG/1
10 TABLET, FILM COATED ORAL AT BEDTIME
Refills: 0 | Status: DISCONTINUED | OUTPATIENT
Start: 2023-06-05 | End: 2023-06-07

## 2023-06-05 RX ORDER — HYDROXYZINE HCL 10 MG
10 TABLET ORAL ONCE
Refills: 0 | Status: DISCONTINUED | OUTPATIENT
Start: 2023-06-05 | End: 2023-06-07

## 2023-06-05 RX ORDER — SODIUM CHLORIDE 9 MG/ML
1000 INJECTION, SOLUTION INTRAVENOUS
Refills: 0 | Status: DISCONTINUED | OUTPATIENT
Start: 2023-06-05 | End: 2023-06-07

## 2023-06-05 RX ORDER — AMPICILLIN TRIHYDRATE 250 MG
CAPSULE ORAL
Refills: 0 | Status: DISCONTINUED | OUTPATIENT
Start: 2023-06-05 | End: 2023-06-05

## 2023-06-05 RX ORDER — GABAPENTIN 400 MG/1
600 CAPSULE ORAL
Refills: 0 | Status: DISCONTINUED | OUTPATIENT
Start: 2023-06-05 | End: 2023-06-07

## 2023-06-05 RX ORDER — HEPARIN SODIUM 5000 [USP'U]/ML
5000 INJECTION INTRAVENOUS; SUBCUTANEOUS EVERY 12 HOURS
Refills: 0 | Status: DISCONTINUED | OUTPATIENT
Start: 2023-06-05 | End: 2023-06-05

## 2023-06-05 RX ORDER — DEXTROSE 50 % IN WATER 50 %
25 SYRINGE (ML) INTRAVENOUS ONCE
Refills: 0 | Status: DISCONTINUED | OUTPATIENT
Start: 2023-06-05 | End: 2023-06-06

## 2023-06-05 RX ORDER — ASPIRIN/CALCIUM CARB/MAGNESIUM 324 MG
81 TABLET ORAL DAILY
Refills: 0 | Status: DISCONTINUED | OUTPATIENT
Start: 2023-06-05 | End: 2023-06-07

## 2023-06-05 RX ORDER — INSULIN LISPRO 100/ML
VIAL (ML) SUBCUTANEOUS
Refills: 0 | Status: DISCONTINUED | OUTPATIENT
Start: 2023-06-05 | End: 2023-06-07

## 2023-06-05 RX ORDER — INSULIN LISPRO 100/ML
VIAL (ML) SUBCUTANEOUS
Refills: 0 | Status: DISCONTINUED | OUTPATIENT
Start: 2023-06-05 | End: 2023-06-05

## 2023-06-05 RX ORDER — INSULIN GLARGINE 100 [IU]/ML
20 INJECTION, SOLUTION SUBCUTANEOUS AT BEDTIME
Refills: 0 | Status: DISCONTINUED | OUTPATIENT
Start: 2023-06-05 | End: 2023-06-07

## 2023-06-05 RX ORDER — EXENATIDE 250 UG/ML
2 INJECTION SUBCUTANEOUS
Qty: 0 | Refills: 0 | DISCHARGE

## 2023-06-05 RX ORDER — LUBIPROSTONE 24 UG/1
1 CAPSULE, GELATIN COATED ORAL
Qty: 0 | Refills: 0 | DISCHARGE

## 2023-06-05 RX ORDER — TOLTERODINE TARTRATE 1 MG/1
1 TABLET, FILM COATED ORAL
Qty: 0 | Refills: 0 | DISCHARGE

## 2023-06-05 RX ORDER — DEXTROSE 50 % IN WATER 50 %
15 SYRINGE (ML) INTRAVENOUS ONCE
Refills: 0 | Status: DISCONTINUED | OUTPATIENT
Start: 2023-06-05 | End: 2023-06-06

## 2023-06-05 RX ORDER — INSULIN LISPRO 100/ML
VIAL (ML) SUBCUTANEOUS AT BEDTIME
Refills: 0 | Status: DISCONTINUED | OUTPATIENT
Start: 2023-06-05 | End: 2023-06-07

## 2023-06-05 RX ORDER — DEXTROSE 50 % IN WATER 50 %
15 SYRINGE (ML) INTRAVENOUS ONCE
Refills: 0 | Status: DISCONTINUED | OUTPATIENT
Start: 2023-06-05 | End: 2023-06-07

## 2023-06-05 RX ORDER — AMPICILLIN TRIHYDRATE 250 MG
2 CAPSULE ORAL ONCE
Refills: 0 | Status: COMPLETED | OUTPATIENT
Start: 2023-06-05 | End: 2023-06-05

## 2023-06-05 RX ORDER — CEFTRIAXONE 500 MG/1
2000 INJECTION, POWDER, FOR SOLUTION INTRAMUSCULAR; INTRAVENOUS ONCE
Refills: 0 | Status: COMPLETED | OUTPATIENT
Start: 2023-06-05 | End: 2023-06-05

## 2023-06-05 RX ORDER — CEFTRIAXONE 500 MG/1
2000 INJECTION, POWDER, FOR SOLUTION INTRAMUSCULAR; INTRAVENOUS EVERY 12 HOURS
Refills: 0 | Status: DISCONTINUED | OUTPATIENT
Start: 2023-06-05 | End: 2023-06-06

## 2023-06-05 RX ORDER — CLOPIDOGREL BISULFATE 75 MG/1
1 TABLET, FILM COATED ORAL
Qty: 0 | Refills: 0 | DISCHARGE

## 2023-06-05 RX ORDER — OXYBUTYNIN CHLORIDE 5 MG
5 TABLET ORAL
Refills: 0 | Status: DISCONTINUED | OUTPATIENT
Start: 2023-06-05 | End: 2023-06-07

## 2023-06-05 RX ORDER — INSULIN GLARGINE 100 [IU]/ML
10 INJECTION, SOLUTION SUBCUTANEOUS ONCE
Refills: 0 | Status: COMPLETED | OUTPATIENT
Start: 2023-06-05 | End: 2023-06-05

## 2023-06-05 RX ORDER — ASCORBIC ACID 60 MG
1 TABLET,CHEWABLE ORAL
Qty: 0 | Refills: 0 | DISCHARGE

## 2023-06-05 RX ORDER — INSULIN GLARGINE 100 [IU]/ML
10 INJECTION, SOLUTION SUBCUTANEOUS AT BEDTIME
Refills: 0 | Status: DISCONTINUED | OUTPATIENT
Start: 2023-06-05 | End: 2023-06-05

## 2023-06-05 RX ORDER — DEXTROSE 50 % IN WATER 50 %
12.5 SYRINGE (ML) INTRAVENOUS ONCE
Refills: 0 | Status: DISCONTINUED | OUTPATIENT
Start: 2023-06-05 | End: 2023-06-06

## 2023-06-05 RX ORDER — VANCOMYCIN HCL 1 G
1000 VIAL (EA) INTRAVENOUS ONCE
Refills: 0 | Status: COMPLETED | OUTPATIENT
Start: 2023-06-05 | End: 2023-06-05

## 2023-06-05 RX ORDER — ACYCLOVIR SODIUM 500 MG
1000 VIAL (EA) INTRAVENOUS ONCE
Refills: 0 | Status: COMPLETED | OUTPATIENT
Start: 2023-06-05 | End: 2023-06-05

## 2023-06-05 RX ORDER — DEXTROSE 50 % IN WATER 50 %
12.5 SYRINGE (ML) INTRAVENOUS ONCE
Refills: 0 | Status: DISCONTINUED | OUTPATIENT
Start: 2023-06-05 | End: 2023-06-07

## 2023-06-05 RX ORDER — PANTOPRAZOLE SODIUM 20 MG/1
40 TABLET, DELAYED RELEASE ORAL
Refills: 0 | Status: DISCONTINUED | OUTPATIENT
Start: 2023-06-05 | End: 2023-06-07

## 2023-06-05 RX ORDER — POTASSIUM CHLORIDE 20 MEQ
10 PACKET (EA) ORAL
Refills: 0 | Status: COMPLETED | OUTPATIENT
Start: 2023-06-05 | End: 2023-06-05

## 2023-06-05 RX ORDER — OMEGA-3 ACID ETHYL ESTERS 1 G
0 CAPSULE ORAL
Qty: 0 | Refills: 0 | DISCHARGE

## 2023-06-05 RX ORDER — INSULIN LISPRO 100/ML
VIAL (ML) SUBCUTANEOUS AT BEDTIME
Refills: 0 | Status: DISCONTINUED | OUTPATIENT
Start: 2023-06-05 | End: 2023-06-05

## 2023-06-05 RX ORDER — HYDROCHLOROTHIAZIDE 25 MG
1 TABLET ORAL
Refills: 0 | DISCHARGE

## 2023-06-05 RX ORDER — AMPICILLIN TRIHYDRATE 250 MG
2 CAPSULE ORAL EVERY 6 HOURS
Refills: 0 | Status: DISCONTINUED | OUTPATIENT
Start: 2023-06-05 | End: 2023-06-05

## 2023-06-05 RX ORDER — VANCOMYCIN HCL 1 G
1000 VIAL (EA) INTRAVENOUS EVERY 12 HOURS
Refills: 0 | Status: DISCONTINUED | OUTPATIENT
Start: 2023-06-05 | End: 2023-06-05

## 2023-06-05 RX ORDER — AMLODIPINE BESYLATE 2.5 MG/1
10 TABLET ORAL DAILY
Refills: 0 | Status: DISCONTINUED | OUTPATIENT
Start: 2023-06-05 | End: 2023-06-07

## 2023-06-05 RX ADMIN — GABAPENTIN 600 MILLIGRAM(S): 400 CAPSULE ORAL at 17:51

## 2023-06-05 RX ADMIN — Medication 5: at 11:50

## 2023-06-05 RX ADMIN — Medication 6: at 08:01

## 2023-06-05 RX ADMIN — CEFTRIAXONE 100 MILLIGRAM(S): 500 INJECTION, POWDER, FOR SOLUTION INTRAMUSCULAR; INTRAVENOUS at 04:29

## 2023-06-05 RX ADMIN — INSULIN GLARGINE 20 UNIT(S): 100 INJECTION, SOLUTION SUBCUTANEOUS at 22:04

## 2023-06-05 RX ADMIN — SODIUM CHLORIDE 150 MILLILITER(S): 9 INJECTION, SOLUTION INTRAVENOUS at 08:47

## 2023-06-05 RX ADMIN — Medication 1: at 22:08

## 2023-06-05 RX ADMIN — Medication 6: at 18:44

## 2023-06-05 RX ADMIN — Medication 5 MILLIGRAM(S): at 17:51

## 2023-06-05 RX ADMIN — CEFTRIAXONE 100 MILLIGRAM(S): 500 INJECTION, POWDER, FOR SOLUTION INTRAMUSCULAR; INTRAVENOUS at 15:26

## 2023-06-05 RX ADMIN — Medication 200 GRAM(S): at 05:43

## 2023-06-05 RX ADMIN — ATORVASTATIN CALCIUM 10 MILLIGRAM(S): 80 TABLET, FILM COATED ORAL at 21:52

## 2023-06-05 RX ADMIN — Medication 100 MILLIEQUIVALENT(S): at 11:09

## 2023-06-05 RX ADMIN — Medication 100 MILLIEQUIVALENT(S): at 09:52

## 2023-06-05 RX ADMIN — INSULIN GLARGINE 10 UNIT(S): 100 INJECTION, SOLUTION SUBCUTANEOUS at 15:45

## 2023-06-05 RX ADMIN — Medication 270 MILLIGRAM(S): at 05:51

## 2023-06-05 RX ADMIN — Medication 250 MILLIGRAM(S): at 02:30

## 2023-06-05 RX ADMIN — Medication 200 GRAM(S): at 13:29

## 2023-06-05 RX ADMIN — Medication 100 MILLIEQUIVALENT(S): at 08:47

## 2023-06-05 NOTE — H&P ADULT - PROBLEM SELECTOR PLAN 2
- Med recc complete  - A1C  - r/o HHS: pH 7.42, , bHb 0.3, +AMS    = serum osm    = fluids: d5 1/2 NS at 150cc/hr     = insulin:     = K+ repletion: 40meq / L fluids  - BMP q4h

## 2023-06-05 NOTE — PATIENT PROFILE ADULT - FALL HARM RISK - HARM RISK INTERVENTIONS
Assistance with ambulation/Assistance OOB with selected safe patient handling equipment/Communicate Risk of Fall with Harm to all staff/Discuss with provider need for PT consult/Monitor gait and stability/Provide patient with walking aids - walker, cane, crutches/Reinforce activity limits and safety measures with patient and family/Sit up slowly, dangle for a short time, stand at bedside before walking/Tailored Fall Risk Interventions/Visual Cue: Yellow wristband and red socks/Bed in lowest position, wheels locked, appropriate side rails in place/Call bell, personal items and telephone in reach/Instruct patient to call for assistance before getting out of bed or chair/Non-slip footwear when patient is out of bed/[place] to call system/Physically safe environment - no spills, clutter or unnecessary equipment/Purposeful Proactive Rounding/Room/bathroom lighting operational, light cord in reach

## 2023-06-05 NOTE — H&P ADULT - NSHPPHYSICALEXAM_GEN_ALL_CORE
VITALS:   T(C): 36.8 (06-05-23 @ 00:37), Max: 39.2 (06-04-23 @ 21:18)  HR: 85 (06-05-23 @ 00:37) (85 - 95)  BP: 122/67 (06-05-23 @ 00:37) (122/67 - 168/85)  RR: 18 (06-05-23 @ 00:37) (17 - 20)  SpO2: 96% (06-05-23 @ 00:37) (96% - 100%)    GENERAL: NAD, lying in bed but uncomfortable - moving constantly; A&Ox3, following instructions  HEAD:  Atraumatic, Normocephalic  EYES: EOMI, PERRLA, conjunctiva and sclera clear  ENT: Moist mucous membranes  NECK: Supple, No JVD  CHEST/LUNG: Clear to auscultation bilaterally; No rales, rhonchi, wheezing, or rubs. Unlabored respirations  HEART: Regular rate and rhythm; No murmurs, rubs, or gallops  ABDOMEN: BSx4; Soft, nontender; obese abdomen  EXTREMITIES:  2+ Peripheral Pulses, brisk capillary refill. No clubbing, cyanosis, or edema  NERVOUS SYSTEM:  A&Ox3, no focal deficits - moving all extremities  SKIN: No rashes or lesions  Psych: Normal speech, but uncomfortable - constantly moving, yawning

## 2023-06-05 NOTE — PROGRESS NOTE ADULT - PROBLEM SELECTOR PLAN 5
- DVT PPx: HSQ  - Diet: NPO >> advance when mentation improves    Full Code. - DVT PPx: HSQ  - Diet: CC  - Dispo: pending clinical improvement  Full Code.

## 2023-06-05 NOTE — ED ADULT NURSE REASSESSMENT NOTE - NS ED NURSE REASSESS COMMENT FT1
Pt in room 3. A&Ox1 current mental status. Vitally stable except for elevated oral temp admitting doctor aware and at bedside. Safety maintained. Due to acuity of illness pt unable to state any change in symptoms. Per admitting team orders, do not run K runs until morning labs are back. If pt morning labs show hypokalemia then to start K runs. Pt currently receiving IV abx at this time. Safety maintained.
received report from day shift rn. Pt A&Ox1 to name only, unable to answer other orientation questions. vitally stable. pt does not appear in any distress, but is unable to answer any questions relating to symptoms of illness. family at bedside and states this has been her orientation status since 930am this morning 6/4. Safety maintained

## 2023-06-05 NOTE — PROGRESS NOTE ADULT - PROBLEM SELECTOR PLAN 2
- Med recc complete  - A1C  - r/o HHS: pH 7.42, , bHb 0.3, +AMS    = serum osm    = fluids: d5 1/2 NS at 150cc/hr     = insulin:     = K+ repletion: 40meq / L fluids  - BMP q4h home meds: basaglar 20, metformin 1000 bid, januvia 100  - A1C: 13.7  - VBG: pH 7.42, , bHb 0.3, +AMS  - fluids: d5 1/2 NS at 150cc/hr   - insulin 20 units   - K+ repletion: 40meq / L fluids  - BMP q4h

## 2023-06-05 NOTE — PROGRESS NOTE ADULT - SUBJECTIVE AND OBJECTIVE BOX
INTERVAL HPI/OVERNIGHT EVENTS:  Pt seen and examined at bedside. No acute overnight events or complaints.    VITAL SIGNS:  T(F): 99.5 (23 @ 05:51)  HR: 82 (23 @ 05:51)  BP: 146/60 (23 @ 05:51)  RR: 18 (23 @ 05:51)  SpO2: 98% (23 @ 05:51)  Wt(kg): --    PHYSICAL EXAM:    GENERAL: NAD, lying in bed but uncomfortable  HEAD:  Atraumatic, Normocephalic  EYES: EOMI, PERRLA, conjunctiva and sclera clear  ENT: Moist mucous membranes  NECK: Supple, No JVD  CHEST/LUNG: Clear to auscultation bilaterally; No rales, rhonchi, wheezing, or rubs. Unlabored respirations  HEART: Regular rate and rhythm; No murmurs, rubs, or gallops  ABDOMEN: BSx4; Soft, nontender; obese abdomen  EXTREMITIES:  2+ Peripheral Pulses, brisk capillary refill. No clubbing, cyanosis, or edema  NERVOUS SYSTEM:  A&Ox3, no focal deficits - moving all extremities, following instructions  SKIN: No rashes or lesions  Psych: Normal speech, but uncomfortable - constantly moving, yawning    MEDICATIONS  (STANDING):  ampicillin  IVPB 2 Gram(s) IV Intermittent every 6 hours  ampicillin  IVPB      cefTRIAXone   IVPB 2000 milliGRAM(s) IV Intermittent every 12 hours  dextrose 5%. 1000 milliLiter(s) (100 mL/Hr) IV Continuous <Continuous>  dextrose 5%. 1000 milliLiter(s) (50 mL/Hr) IV Continuous <Continuous>  dextrose 50% Injectable 25 Gram(s) IV Push once  dextrose 50% Injectable 25 Gram(s) IV Push once  dextrose 50% Injectable 12.5 Gram(s) IV Push once  glucagon  Injectable 1 milliGRAM(s) IntraMuscular once  heparin   Injectable 5000 Unit(s) SubCutaneous every 12 hours  insulin glargine Injectable (LANTUS) 10 Unit(s) SubCutaneous at bedtime  insulin lispro (ADMELOG) corrective regimen sliding scale   SubCutaneous three times a day before meals  insulin lispro (ADMELOG) corrective regimen sliding scale   SubCutaneous at bedtime  lactated ringers. 1000 milliLiter(s) (150 mL/Hr) IV Continuous <Continuous>  potassium chloride  10 mEq/100 mL IVPB 10 milliEquivalent(s) IV Intermittent every 1 hour  vancomycin  IVPB 1000 milliGRAM(s) IV Intermittent every 12 hours    MEDICATIONS  (PRN):  acetaminophen     Tablet .. 650 milliGRAM(s) Oral every 6 hours PRN Temp greater or equal to 38C (100.4F), Mild Pain (1 - 3)  dextrose Oral Gel 15 Gram(s) Oral once PRN Blood Glucose LESS THAN 70 milliGRAM(s)/deciliter      Allergies    No Known Allergies    Intolerances        LABS:                        13.6   7.51  )-----------( 193      ( 2023 02:40 )             40.4     06-04    129<L>  |  87<L>  |  23  ----------------------------<  574<HH>  4.2   |  26  |  1.04    Ca    9.8      2023 16:16  Phos  2.9     06-05  Mg     1.60     06-05    TPro  7.2  /  Alb  4.2  /  TBili  0.4  /  DBili  x   /  AST  24  /  ALT  24  /  AlkPhos  102  06-04    PT/INR - ( 2023 17:50 )   PT: 11.4 sec;   INR: 0.98 ratio         PTT - ( 2023 17:50 )  PTT:27.2 sec  Urinalysis Basic - ( 2023 19:15 )    Color: Light Yellow / Appearance: Clear / S.036 / pH: x  Gluc: x / Ketone: Trace  / Bili: Negative / Urobili: <2 mg/dL   Blood: x / Protein: 30 mg/dL / Nitrite: Negative   Leuk Esterase: Negative / RBC: 2 /HPF / WBC 9 /HPF   Sq Epi: x / Non Sq Epi: x / Bacteria: Negative        RADIOLOGY & ADDITIONAL TESTS:  Reviewed      ******************  Authored By: Marixa Del Rio MD PGY1  Internal Medicine  Pager: [phone] [facility]// [id number] [facility]  MS Teams Preferred  ******************   INTERVAL HPI/OVERNIGHT EVENTS:  Pt seen and examined at bedside. No acute overnight events or complaints.    VITAL SIGNS:  T(F): 99.5 (23 @ 05:51)  HR: 82 (23 @ 05:51)  BP: 146/60 (23 @ 05:51)  RR: 18 (23 @ 05:51)  SpO2: 98% (23 @ 05:51)  Wt(kg): --    PHYSICAL EXAM:    GENERAL: NAD, lying in bed   HEAD:  Atraumatic, Normocephalic  EYES: EOMI, PERRLA, conjunctiva and sclera clear  ENT: Moist mucous membranes  NECK: Supple, No JVD  CHEST/LUNG: Clear to auscultation bilaterally; No rales, rhonchi, wheezing, or rubs. Unlabored respirations  HEART: Regular rate and rhythm; No murmurs, rubs, or gallops  ABDOMEN: BSx4; Soft, nontender; obese abdomen  EXTREMITIES:  2+ Peripheral Pulses, brisk capillary refill. No clubbing, cyanosis, or edema  NERVOUS SYSTEM:  A&Ox3, no focal deficits - moving all extremities, following instructions  SKIN: No rashes or lesions  Psych: Normal speech, but uncomfortable - constantly moving, yawning    MEDICATIONS  (STANDING):  ampicillin  IVPB 2 Gram(s) IV Intermittent every 6 hours  ampicillin  IVPB      cefTRIAXone   IVPB 2000 milliGRAM(s) IV Intermittent every 12 hours  dextrose 5%. 1000 milliLiter(s) (100 mL/Hr) IV Continuous <Continuous>  dextrose 5%. 1000 milliLiter(s) (50 mL/Hr) IV Continuous <Continuous>  dextrose 50% Injectable 25 Gram(s) IV Push once  dextrose 50% Injectable 25 Gram(s) IV Push once  dextrose 50% Injectable 12.5 Gram(s) IV Push once  glucagon  Injectable 1 milliGRAM(s) IntraMuscular once  heparin   Injectable 5000 Unit(s) SubCutaneous every 12 hours  insulin glargine Injectable (LANTUS) 10 Unit(s) SubCutaneous at bedtime  insulin lispro (ADMELOG) corrective regimen sliding scale   SubCutaneous three times a day before meals  insulin lispro (ADMELOG) corrective regimen sliding scale   SubCutaneous at bedtime  lactated ringers. 1000 milliLiter(s) (150 mL/Hr) IV Continuous <Continuous>  potassium chloride  10 mEq/100 mL IVPB 10 milliEquivalent(s) IV Intermittent every 1 hour  vancomycin  IVPB 1000 milliGRAM(s) IV Intermittent every 12 hours    MEDICATIONS  (PRN):  acetaminophen     Tablet .. 650 milliGRAM(s) Oral every 6 hours PRN Temp greater or equal to 38C (100.4F), Mild Pain (1 - 3)  dextrose Oral Gel 15 Gram(s) Oral once PRN Blood Glucose LESS THAN 70 milliGRAM(s)/deciliter      Allergies    No Known Allergies    Intolerances        LABS:                        13.6   7.51  )-----------( 193      ( 2023 02:40 )             40.4     06-04    129<L>  |  87<L>  |  23  ----------------------------<  574<HH>  4.2   |  26  |  1.04    Ca    9.8      2023 16:16  Phos  2.9     06-05  Mg     1.60     06-05    TPro  7.2  /  Alb  4.2  /  TBili  0.4  /  DBili  x   /  AST  24  /  ALT  24  /  AlkPhos  102  06-04    PT/INR - ( 2023 17:50 )   PT: 11.4 sec;   INR: 0.98 ratio         PTT - ( 2023 17:50 )  PTT:27.2 sec  Urinalysis Basic - ( 2023 19:15 )    Color: Light Yellow / Appearance: Clear / S.036 / pH: x  Gluc: x / Ketone: Trace  / Bili: Negative / Urobili: <2 mg/dL   Blood: x / Protein: 30 mg/dL / Nitrite: Negative   Leuk Esterase: Negative / RBC: 2 /HPF / WBC 9 /HPF   Sq Epi: x / Non Sq Epi: x / Bacteria: Negative        RADIOLOGY & ADDITIONAL TESTS:  Reviewed      ******************  Authored By: Marixa Del Rio MD PGY1  Internal Medicine  Pager: [phone] [facility]// [id number] [facility]  MS Teams Preferred  ******************

## 2023-06-05 NOTE — ED PROCEDURE NOTE - ATTENDING CONTRIBUTION TO CARE
Patient positioned seated upright.  Attempts made without successful drainage of CSF.  Further attempts aborted.  Will empirically treat and admit for interventional radiology guided drainage.

## 2023-06-05 NOTE — H&P ADULT - PROBLEM SELECTOR PLAN 1
- LP attempted in ED, unsuccessful  - CT-head, CXR wnl  - Meets SIRS criteria with 102.5 fever + HR 90s + ?source + lactate 2.8  - UA, COVID/RVP negative; WBC wnl  - BCx, UCx, procal, tox screen  - Abx - empirically: vanc + ctx + ampicillin + acyclovir started in ED  - r/o HHS: serum osm, fluids, insulin, K+ repletion  - CT-A/P non-contrast

## 2023-06-05 NOTE — H&P ADULT - ASSESSMENT
72F PMH HTN, HLD, DM2, CAD (PCI) presents from home with daughter for AMS (last known normal 9:30AM 6/4/23). Febrile 102.5 72F PMH HTN, HLD, DM2, CAD (PCI) presents from home with daughter for AMS (last known normal 9:30AM 6/4/23). Febrile 102.5, , AG 16, lactate 2.8. Admitted for acute metabolic encephalopathy 2/2 suspected infectious etiology (?meningitis) vs. [facility].

## 2023-06-05 NOTE — PROGRESS NOTE ADULT - PROBLEM SELECTOR PLAN 1
- LP attempted in ED, unsuccessful  - CT-head, CXR wnl  - Meets SIRS criteria with 102.5 fever + HR 90s + ?source + lactate 2.8  - UA, COVID/RVP negative; WBC wnl  - BCx, UCx, procal, tox screen  - Abx - empirically: vanc + ctx + ampicillin + acyclovir started in ED  - r/o HHS: serum osm, fluids, insulin, K+ repletion  - CT-A/P non-contrast - LP attempted in ED, unsuccessful  - CT-head, CXR wnl  - Meets SIRS criteria with 102.5 fever + HR 90s + ?source + lactate 2.8  - UA, COVID/RVP negative; WBC wnl  - BCx, UCx, procal, tox screen  - Abx - empirically: vanc + ctx + ampicillin + acyclovir started in ED  - d/c vanc, ampicillin, and acyclovir  - r/o HHS: serum osm, fluids, insulin, K+ repletion

## 2023-06-05 NOTE — H&P ADULT - HISTORY OF PRESENT ILLNESS
72F PMH HTN, HLD, DM2, CAD (PCI) presents from home with daughter for AMS (last known normal 9:30AM 6/4/23). Per daughter, patient became increasingly confused throughout the day - while pt is A&Ox4 and ambulates without assistance at baseline. Pt was recently treated for a possible UTI with macrobid by PCP - 2 doses taken yesterday. Pt hasn't been complaining of anything recently at home, daughter hasn't noted any fevers/chills, n/v, cough/congestion, CP/SOB, new swelling or skin changes, or prior history of a similar episode.    In ED:  - Febrile (rectal) to 102.5, WBC 9.5, procal 0.35, lactate 2.8. Glucose 574, AG 16, bHb 0.3.  - CXR and CT-head wnl  - LP unsuccessful >> empiric tx for meningitis started (vanc, ctx, amp, acyclovir)  - Tylenol 1g, LR 2L, 5U admelog

## 2023-06-05 NOTE — H&P ADULT - NSHPLABSRESULTS_GEN_ALL_CORE
LABS:                          13.6   7.51  )-----------( 193      ( 2023 02:40 )             40.4     06-04    129<L>  |  87<L>  |  23  ----------------------------<  574<HH>  4.2   |  26  |  1.04    Ca    9.8      2023 16:16  Phos  2.9     06-05  Mg     1.60     06-05    TPro  7.2  /  Alb  4.2  /  TBili  0.4  /  DBili  x   /  AST  24  /  ALT  24  /  AlkPhos  102  06-04    LIVER FUNCTIONS - ( 2023 16:16 )  Alb: 4.2 g/dL / Pro: 7.2 g/dL / ALK PHOS: 102 U/L / ALT: 24 U/L / AST: 24 U/L / GGT: x           PT/INR - ( 2023 17:50 )   PT: 11.4 sec;   INR: 0.98 ratio         PTT - ( 2023 17:50 )  PTT:27.2 sec          Urinalysis Basic - ( 2023 19:15 )    Color: Light Yellow / Appearance: Clear / S.036 / pH: x  Gluc: x / Ketone: Trace  / Bili: Negative / Urobili: <2 mg/dL   Blood: x / Protein: 30 mg/dL / Nitrite: Negative   Leuk Esterase: Negative / RBC: 2 /HPF / WBC 9 /HPF   Sq Epi: x / Non Sq Epi: x / Bacteria: Negative        Xray Chest 1 View- PORTABLE-Urgent (23 @ 16:31)    IMPRESSION:  Clear lungs.          CT Head No Cont (23 @ 18:34)    IMPRESSION:  No evidence of acute transcortical infarct, acute intracranial   hemorrhage, or mass effect. LABS:                          13.6   7.51  )-----------( 193      ( 2023 02:40 )             40.4     06-04    129<L>  |  87<L>  |  23  ----------------------------<  574<HH>  4.2   |  26  |  1.04    Ca    9.8      2023 16:16  Phos  2.9     06-05  Mg     1.60     06-05    TPro  7.2  /  Alb  4.2  /  TBili  0.4  /  DBili  x   /  AST  24  /  ALT  24  /  AlkPhos  102  06-04    LIVER FUNCTIONS - ( 2023 16:16 )  Alb: 4.2 g/dL / Pro: 7.2 g/dL / ALK PHOS: 102 U/L / ALT: 24 U/L / AST: 24 U/L / GGT: x           PT/INR - ( 2023 17:50 )   PT: 11.4 sec;   INR: 0.98 ratio         PTT - ( 2023 17:50 )  PTT:27.2 sec          Urinalysis Basic - ( 2023 19:15 )    Color: Light Yellow / Appearance: Clear / S.036 / pH: x  Gluc: x / Ketone: Trace  / Bili: Negative / Urobili: <2 mg/dL   Blood: x / Protein: 30 mg/dL / Nitrite: Negative   Leuk Esterase: Negative / RBC: 2 /HPF / WBC 9 /HPF   Sq Epi: x / Non Sq Epi: x / Bacteria: Negative    EKG: NSR, no acute ischemic changes, prolonged qt    Xray Chest 1 View- PORTABLE-Urgent (23 @ 16:31)    IMPRESSION:  Clear lungs.          CT Head No Cont (23 @ 18:34)    IMPRESSION:  No evidence of acute transcortical infarct, acute intracranial   hemorrhage, or mass effect.

## 2023-06-05 NOTE — PROGRESS NOTE ADULT - PROBLEM SELECTOR PLAN 3
- -160s on admission  - Med recc  - Hold home anti-HTN iso sirs/sepsis home med: amlodipine 10   - -160s on admission  - Hold home anti-HTN iso sirs/sepsis

## 2023-06-05 NOTE — H&P ADULT - ATTENDING COMMENTS
Pt is a 72F PMH HTN, HLD, DM2, CAD (PCI) presents from home with daughter for AMS x1 day. Pt found to be febrile to 102.5 and pt admitted for acute metabolic encephalopathy possibly in setting of infection. U/A negative, CXR w/o evidence of pna, CTH no acute pathology.  Concern for possible meningitis in ED. LP attempted in ED however unsuccessful, started on empiric tx for meninigitis. Pt complains of headache however states her "whole" body hurts. Unclear source of fever, likely infectious etiology. Will c/w empiric tx for meningitis with ceftriaxone, vanc, ampicillin and acyclovir. Attempt another LP in AM. Obtain CT a/p to r/o possible intrabd source.  Glucose elevated in setting of poor controlled DM, possibly HHS however glucose improving, F/u A1C, start ISS and basal insulin. Pt is a 72F PMH HTN, HLD, DM2, CAD (PCI) presents from home with daughter for AMS x1 day. Pt found to be febrile to 102.5 and pt admitted for acute metabolic encephalopathy possibly in setting of infection. U/A negative, CXR w/o evidence of pna, CTH no acute pathology.  Concern for possible meningitis in ED. LP attempted in ED however unsuccessful, started on empiric tx for meninigitis. Pt complains of headache however states her "whole" body hurts, On exam, pt A&Ox2-3. Unclear source of fever/AMS, likely infectious etiology. Will c/w empiric tx for meningitis with ceftriaxone, vanc, ampicillin and acyclovir. Attempt another LP in AM. Obtain CT a/p to r/o possible intrabd source.  Glucose elevated in setting of poor controlled DM, possibly HHS however glucose improving, not in DKA, F/u A1C, start ISS and basal insulin. Pt is a 72F PMH HTN, HLD, DM2, CAD (PCI) presents from home with daughter for AMS x1 day. Pt found to be febrile to 102.5 and pt admitted for acute metabolic encephalopathy possibly in setting of infection. U/A negative, CXR w/o evidence of pna, CTH no acute pathology.  Concern for possible meningitis in ED. LP attempted in ED however unsuccessful, started on empiric tx for meninigitis. Pt complains of headache however states her "whole" body hurts, On exam, pt A&Ox2-3. Unclear source of fever/AMS, likely infectious etiology. Will c/w empiric tx for meningitis with ceftriaxone, vanc, ampicillin and acyclovir. Procedure team vs IR consult for LP in AM. Obtain CT a/p to r/o possible intrabd source.  Glucose elevated in setting of poorly controlled DM, possible HHS however glucose improving, not in DKA, F/u A1C, start ISS and basal insulin.

## 2023-06-06 LAB
A1C WITH ESTIMATED AVERAGE GLUCOSE RESULT: 14 % — HIGH (ref 4–5.6)
ALBUMIN SERPL ELPH-MCNC: 3 G/DL — LOW (ref 3.3–5)
ALP SERPL-CCNC: 98 U/L — SIGNIFICANT CHANGE UP (ref 40–120)
ALT FLD-CCNC: 37 U/L — HIGH (ref 4–33)
ANION GAP SERPL CALC-SCNC: 12 MMOL/L — SIGNIFICANT CHANGE UP (ref 7–14)
AST SERPL-CCNC: 59 U/L — HIGH (ref 4–32)
BILIRUB SERPL-MCNC: <0.2 MG/DL — SIGNIFICANT CHANGE UP (ref 0.2–1.2)
BUN SERPL-MCNC: 17 MG/DL — SIGNIFICANT CHANGE UP (ref 7–23)
CALCIUM SERPL-MCNC: 8.5 MG/DL — SIGNIFICANT CHANGE UP (ref 8.4–10.5)
CHLORIDE SERPL-SCNC: 97 MMOL/L — LOW (ref 98–107)
CO2 SERPL-SCNC: 25 MMOL/L — SIGNIFICANT CHANGE UP (ref 22–31)
CREAT SERPL-MCNC: 0.94 MG/DL — SIGNIFICANT CHANGE UP (ref 0.5–1.3)
CULTURE RESULTS: SIGNIFICANT CHANGE UP
EGFR: 64 ML/MIN/1.73M2 — SIGNIFICANT CHANGE UP
ESTIMATED AVERAGE GLUCOSE: 355 — SIGNIFICANT CHANGE UP
GLUCOSE BLDC GLUCOMTR-MCNC: 238 MG/DL — HIGH (ref 70–99)
GLUCOSE BLDC GLUCOMTR-MCNC: 293 MG/DL — HIGH (ref 70–99)
GLUCOSE BLDC GLUCOMTR-MCNC: 363 MG/DL — HIGH (ref 70–99)
GLUCOSE BLDC GLUCOMTR-MCNC: 366 MG/DL — HIGH (ref 70–99)
GLUCOSE SERPL-MCNC: 254 MG/DL — HIGH (ref 70–99)
HCT VFR BLD CALC: 41.1 % — SIGNIFICANT CHANGE UP (ref 34.5–45)
HCV AB S/CO SERPL IA: 0.1 S/CO — SIGNIFICANT CHANGE UP (ref 0–0.99)
HCV AB SERPL-IMP: SIGNIFICANT CHANGE UP
HGB BLD-MCNC: 13.2 G/DL — SIGNIFICANT CHANGE UP (ref 11.5–15.5)
MAGNESIUM SERPL-MCNC: 1.7 MG/DL — SIGNIFICANT CHANGE UP (ref 1.6–2.6)
MCHC RBC-ENTMCNC: 28.2 PG — SIGNIFICANT CHANGE UP (ref 27–34)
MCHC RBC-ENTMCNC: 32.1 GM/DL — SIGNIFICANT CHANGE UP (ref 32–36)
MCV RBC AUTO: 87.8 FL — SIGNIFICANT CHANGE UP (ref 80–100)
NRBC # BLD: 0 /100 WBCS — SIGNIFICANT CHANGE UP (ref 0–0)
NRBC # FLD: 0 K/UL — SIGNIFICANT CHANGE UP (ref 0–0)
PHOSPHATE SERPL-MCNC: 2.9 MG/DL — SIGNIFICANT CHANGE UP (ref 2.5–4.5)
PLATELET # BLD AUTO: 187 K/UL — SIGNIFICANT CHANGE UP (ref 150–400)
POTASSIUM SERPL-MCNC: 3.5 MMOL/L — SIGNIFICANT CHANGE UP (ref 3.5–5.3)
POTASSIUM SERPL-SCNC: 3.5 MMOL/L — SIGNIFICANT CHANGE UP (ref 3.5–5.3)
PROT SERPL-MCNC: 6.1 G/DL — SIGNIFICANT CHANGE UP (ref 6–8.3)
RBC # BLD: 4.68 M/UL — SIGNIFICANT CHANGE UP (ref 3.8–5.2)
RBC # FLD: 13.3 % — SIGNIFICANT CHANGE UP (ref 10.3–14.5)
SODIUM SERPL-SCNC: 134 MMOL/L — LOW (ref 135–145)
SPECIMEN SOURCE: SIGNIFICANT CHANGE UP
WBC # BLD: 6.75 K/UL — SIGNIFICANT CHANGE UP (ref 3.8–10.5)
WBC # FLD AUTO: 6.75 K/UL — SIGNIFICANT CHANGE UP (ref 3.8–10.5)

## 2023-06-06 PROCEDURE — 99232 SBSQ HOSP IP/OBS MODERATE 35: CPT | Mod: GC

## 2023-06-06 RX ADMIN — Medication 2: at 08:38

## 2023-06-06 RX ADMIN — GABAPENTIN 600 MILLIGRAM(S): 400 CAPSULE ORAL at 05:29

## 2023-06-06 RX ADMIN — CEFTRIAXONE 100 MILLIGRAM(S): 500 INJECTION, POWDER, FOR SOLUTION INTRAMUSCULAR; INTRAVENOUS at 04:53

## 2023-06-06 RX ADMIN — INSULIN GLARGINE 20 UNIT(S): 100 INJECTION, SOLUTION SUBCUTANEOUS at 21:23

## 2023-06-06 RX ADMIN — PANTOPRAZOLE SODIUM 40 MILLIGRAM(S): 20 TABLET, DELAYED RELEASE ORAL at 06:27

## 2023-06-06 RX ADMIN — DULOXETINE HYDROCHLORIDE 30 MILLIGRAM(S): 30 CAPSULE, DELAYED RELEASE ORAL at 12:25

## 2023-06-06 RX ADMIN — Medication 5: at 18:04

## 2023-06-06 RX ADMIN — Medication 5 MILLIGRAM(S): at 18:04

## 2023-06-06 RX ADMIN — Medication 5 MILLIGRAM(S): at 05:29

## 2023-06-06 RX ADMIN — GABAPENTIN 600 MILLIGRAM(S): 400 CAPSULE ORAL at 18:03

## 2023-06-06 RX ADMIN — AMLODIPINE BESYLATE 10 MILLIGRAM(S): 2.5 TABLET ORAL at 05:29

## 2023-06-06 RX ADMIN — ATORVASTATIN CALCIUM 10 MILLIGRAM(S): 80 TABLET, FILM COATED ORAL at 21:24

## 2023-06-06 RX ADMIN — Medication 5: at 12:26

## 2023-06-06 RX ADMIN — Medication 81 MILLIGRAM(S): at 12:26

## 2023-06-06 RX ADMIN — Medication 1: at 21:23

## 2023-06-06 NOTE — PROGRESS NOTE ADULT - PROBLEM SELECTOR PLAN 2
home meds: basaglar 20, metformin 1000 bid, januvia 100  - A1C: 13.7  - VBG: pH 7.42, , bHb 0.3, +AMS  - fluids: d5 1/2 NS at 150cc/hr   - insulin 20 units   - K+ repletion: 40meq / L fluids home meds: basaglar 20, metformin 1000 bid, januvia 100  - A1C: 13.7  - VBG: pH 7.42, , bHb 0.3, +AMS  - fluids: d5 1/2 NS at 150cc/hr   - insulin 20 units

## 2023-06-06 NOTE — PROGRESS NOTE ADULT - PROBLEM SELECTOR PLAN 1
- LP attempted in ED, unsuccessful  - CT-head, CXR wnl  - Meets SIRS criteria with 102.5 fever + HR 90s + ?source + lactate 2.8  - UA, COVID/RVP negative; WBC wnl  - BCx, UCx, procal, tox screen  - Abx - empirically: vanc + ctx + ampicillin + acyclovir started in ED  - d/c vanc, ampicillin, and acyclovir  -c/w ceftriaxone for treatment of UTI (partially treated prior to admission)  - r/o HHS: serum osm, fluids, insulin, K+ repletion - LP attempted in ED, unsuccessful  - CT-head, CXR wnl  - Meets SIRS criteria with 102.5 fever + HR 90s + ?source + lactate 2.8  - UA, COVID/RVP negative; WBC wnl  - BCx, UCx, procal, tox screen  - Abx - empirically: vanc + ctx + ampicillin + acyclovir started in ED  - d/c vanc, ampicillin, and acyclovir  -c/w ceftriaxone for treatment of UTI (partially treated prior to admission)

## 2023-06-06 NOTE — PROGRESS NOTE ADULT - SUBJECTIVE AND OBJECTIVE BOX
INTERVAL HPI/OVERNIGHT EVENTS:  Pt seen and examined at bedside. No acute overnight events or complaints.    VITAL SIGNS:  T(F): 98.4 (23 @ 05:04)  HR: 85 (23 @ 05:04)  BP: 143/73 (23 @ 05:04)  RR: 17 (23 @ 05:04)  SpO2: 96% (23 @ 05:04)  Wt(kg): --    PHYSICAL EXAM:    GENERAL: NAD, lying in bed   HEAD:  Atraumatic, Normocephalic  EYES: EOMI, PERRLA, conjunctiva and sclera clear  ENT: Moist mucous membranes  NECK: Supple, No JVD  CHEST/LUNG: Clear to auscultation bilaterally; No rales, rhonchi, wheezing, or rubs. Unlabored respirations  HEART: Regular rate and rhythm; No murmurs, rubs, or gallops  ABDOMEN: BSx4; Soft, nontender; obese abdomen  EXTREMITIES:  2+ Peripheral Pulses, brisk capillary refill. No clubbing, cyanosis, or edema  NERVOUS SYSTEM:  A&Ox3, no focal deficits - moving all extremities, following instructions  SKIN: No rashes or lesions  Psych: Normal speech    MEDICATIONS  (STANDING):  amLODIPine   Tablet 10 milliGRAM(s) Oral daily  aspirin  chewable 81 milliGRAM(s) Oral daily  atorvastatin 10 milliGRAM(s) Oral at bedtime  cefTRIAXone   IVPB 2000 milliGRAM(s) IV Intermittent every 12 hours  dextrose 5%. 1000 milliLiter(s) (50 mL/Hr) IV Continuous <Continuous>  dextrose 5%. 1000 milliLiter(s) (50 mL/Hr) IV Continuous <Continuous>  dextrose 5%. 1000 milliLiter(s) (100 mL/Hr) IV Continuous <Continuous>  dextrose 5%. 1000 milliLiter(s) (100 mL/Hr) IV Continuous <Continuous>  dextrose 50% Injectable 25 Gram(s) IV Push once  dextrose 50% Injectable 25 Gram(s) IV Push once  dextrose 50% Injectable 12.5 Gram(s) IV Push once  dextrose 50% Injectable 25 Gram(s) IV Push once  dextrose 50% Injectable 25 Gram(s) IV Push once  dextrose 50% Injectable 12.5 Gram(s) IV Push once  DULoxetine 30 milliGRAM(s) Oral daily  gabapentin 600 milliGRAM(s) Oral two times a day  glucagon  Injectable 1 milliGRAM(s) IntraMuscular once  glucagon  Injectable 1 milliGRAM(s) IntraMuscular once  hydrochlorothiazide 12.5 milliGRAM(s) Oral daily  hydrOXYzine hydrochloride Syrup 10 milliGRAM(s) Oral once  insulin glargine Injectable (LANTUS) 20 Unit(s) SubCutaneous at bedtime  insulin lispro (ADMELOG) corrective regimen sliding scale   SubCutaneous at bedtime  insulin lispro (ADMELOG) corrective regimen sliding scale   SubCutaneous three times a day before meals  lactated ringers. 1000 milliLiter(s) (150 mL/Hr) IV Continuous <Continuous>  oxybutynin 5 milliGRAM(s) Oral two times a day  pantoprazole    Tablet 40 milliGRAM(s) Oral before breakfast    MEDICATIONS  (PRN):  acetaminophen     Tablet .. 650 milliGRAM(s) Oral every 6 hours PRN Temp greater or equal to 38C (100.4F), Mild Pain (1 - 3)  dextrose Oral Gel 15 Gram(s) Oral once PRN Blood Glucose LESS THAN 70 milliGRAM(s)/deciliter  dextrose Oral Gel 15 Gram(s) Oral once PRN Blood Glucose LESS THAN 70 milliGRAM(s)/deciliter      Allergies    No Known Allergies    Intolerances        LABS:                        13.6   7.51  )-----------( 193      ( 2023 02:40 )             40.4     06-05    134<L>  |  95<L>  |  20  ----------------------------<  406<H>  3.5   |  26  |  0.98    Ca    8.6      2023 05:47  Phos  2.7     06-05  Mg     1.60     06-05    TPro  6.0  /  Alb  3.5  /  TBili  0.2  /  DBili  x   /  AST  44<H>  /  ALT  26  /  AlkPhos  82  06-05    PT/INR - ( 2023 17:50 )   PT: 11.4 sec;   INR: 0.98 ratio         PTT - ( 2023 17:50 )  PTT:27.2 sec  Urinalysis Basic - ( 2023 19:15 )    Color: Light Yellow / Appearance: Clear / S.036 / pH: x  Gluc: x / Ketone: Trace  / Bili: Negative / Urobili: <2 mg/dL   Blood: x / Protein: 30 mg/dL / Nitrite: Negative   Leuk Esterase: Negative / RBC: 2 /HPF / WBC 9 /HPF   Sq Epi: x / Non Sq Epi: x / Bacteria: Negative        RADIOLOGY & ADDITIONAL TESTS:  Reviewed      ******************  Authored By: Marixa Del Rio MD PGY1  Internal Medicine  Pager: [phone] [facility]// [id number] [facility]  MS Teams Preferred  ******************

## 2023-06-07 ENCOUNTER — TRANSCRIPTION ENCOUNTER (OUTPATIENT)
Age: 73
End: 2023-06-07

## 2023-06-07 VITALS
OXYGEN SATURATION: 97 % | TEMPERATURE: 98 F | RESPIRATION RATE: 17 BRPM | SYSTOLIC BLOOD PRESSURE: 141 MMHG | DIASTOLIC BLOOD PRESSURE: 74 MMHG | HEART RATE: 69 BPM

## 2023-06-07 LAB
ALBUMIN SERPL ELPH-MCNC: 3 G/DL — LOW (ref 3.3–5)
ALP SERPL-CCNC: 110 U/L — SIGNIFICANT CHANGE UP (ref 40–120)
ALT FLD-CCNC: 41 U/L — HIGH (ref 4–33)
ANION GAP SERPL CALC-SCNC: 13 MMOL/L — SIGNIFICANT CHANGE UP (ref 7–14)
AST SERPL-CCNC: 55 U/L — HIGH (ref 4–32)
BILIRUB SERPL-MCNC: 0.2 MG/DL — SIGNIFICANT CHANGE UP (ref 0.2–1.2)
BUN SERPL-MCNC: 21 MG/DL — SIGNIFICANT CHANGE UP (ref 7–23)
CALCIUM SERPL-MCNC: 8.4 MG/DL — SIGNIFICANT CHANGE UP (ref 8.4–10.5)
CHLORIDE SERPL-SCNC: 96 MMOL/L — LOW (ref 98–107)
CO2 SERPL-SCNC: 25 MMOL/L — SIGNIFICANT CHANGE UP (ref 22–31)
CREAT SERPL-MCNC: 1.01 MG/DL — SIGNIFICANT CHANGE UP (ref 0.5–1.3)
EGFR: 59 ML/MIN/1.73M2 — LOW
GAD65 AB SER-MCNC: 0.01 NMOL/L — SIGNIFICANT CHANGE UP
GLUCOSE BLDC GLUCOMTR-MCNC: 292 MG/DL — HIGH (ref 70–99)
GLUCOSE BLDC GLUCOMTR-MCNC: 318 MG/DL — HIGH (ref 70–99)
GLUCOSE SERPL-MCNC: 287 MG/DL — HIGH (ref 70–99)
HCT VFR BLD CALC: 39.3 % — SIGNIFICANT CHANGE UP (ref 34.5–45)
HGB BLD-MCNC: 12.8 G/DL — SIGNIFICANT CHANGE UP (ref 11.5–15.5)
MAGNESIUM SERPL-MCNC: 1.7 MG/DL — SIGNIFICANT CHANGE UP (ref 1.6–2.6)
MCHC RBC-ENTMCNC: 29.2 PG — SIGNIFICANT CHANGE UP (ref 27–34)
MCHC RBC-ENTMCNC: 32.6 GM/DL — SIGNIFICANT CHANGE UP (ref 32–36)
MCV RBC AUTO: 89.5 FL — SIGNIFICANT CHANGE UP (ref 80–100)
NRBC # BLD: 0 /100 WBCS — SIGNIFICANT CHANGE UP (ref 0–0)
NRBC # FLD: 0 K/UL — SIGNIFICANT CHANGE UP (ref 0–0)
PHOSPHATE SERPL-MCNC: 3.4 MG/DL — SIGNIFICANT CHANGE UP (ref 2.5–4.5)
PLATELET # BLD AUTO: 174 K/UL — SIGNIFICANT CHANGE UP (ref 150–400)
POTASSIUM SERPL-MCNC: 3.4 MMOL/L — LOW (ref 3.5–5.3)
POTASSIUM SERPL-SCNC: 3.4 MMOL/L — LOW (ref 3.5–5.3)
PROT SERPL-MCNC: 5.7 G/DL — LOW (ref 6–8.3)
RBC # BLD: 4.39 M/UL — SIGNIFICANT CHANGE UP (ref 3.8–5.2)
RBC # FLD: 13.2 % — SIGNIFICANT CHANGE UP (ref 10.3–14.5)
SODIUM SERPL-SCNC: 134 MMOL/L — LOW (ref 135–145)
WBC # BLD: 5.16 K/UL — SIGNIFICANT CHANGE UP (ref 3.8–10.5)
WBC # FLD AUTO: 5.16 K/UL — SIGNIFICANT CHANGE UP (ref 3.8–10.5)

## 2023-06-07 PROCEDURE — 99239 HOSP IP/OBS DSCHRG MGMT >30: CPT

## 2023-06-07 RX ORDER — POTASSIUM CHLORIDE 20 MEQ
20 PACKET (EA) ORAL ONCE
Refills: 0 | Status: COMPLETED | OUTPATIENT
Start: 2023-06-07 | End: 2023-06-07

## 2023-06-07 RX ADMIN — Medication 4: at 12:19

## 2023-06-07 RX ADMIN — AMLODIPINE BESYLATE 10 MILLIGRAM(S): 2.5 TABLET ORAL at 05:21

## 2023-06-07 RX ADMIN — Medication 3: at 08:47

## 2023-06-07 RX ADMIN — DULOXETINE HYDROCHLORIDE 30 MILLIGRAM(S): 30 CAPSULE, DELAYED RELEASE ORAL at 12:20

## 2023-06-07 RX ADMIN — GABAPENTIN 600 MILLIGRAM(S): 400 CAPSULE ORAL at 05:16

## 2023-06-07 RX ADMIN — PANTOPRAZOLE SODIUM 40 MILLIGRAM(S): 20 TABLET, DELAYED RELEASE ORAL at 06:26

## 2023-06-07 RX ADMIN — Medication 20 MILLIEQUIVALENT(S): at 08:49

## 2023-06-07 RX ADMIN — Medication 81 MILLIGRAM(S): at 12:20

## 2023-06-07 RX ADMIN — Medication 5 MILLIGRAM(S): at 05:16

## 2023-06-07 NOTE — PROGRESS NOTE ADULT - PROBLEM SELECTOR PLAN 1
- LP attempted in ED, unsuccessful  - CT-head, CXR wnl  - Meets SIRS criteria with 102.5 fever + HR 90s + ?source + lactate 2.8  - UA, COVID/RVP negative; WBC wnl  - BCx, UCx, procal, tox screen  - Abx - empirically: vanc + ctx + ampicillin + acyclovir started in ED  - d/c vanc, ampicillin, and acyclovir  -c/w ceftriaxone for treatment of UTI (partially treated prior to admission) 6/5-6/7 - LP attempted in ED, unsuccessful  - CT-head, CXR wnl  - Meets SIRS criteria with 102.5 fever + HR 90s + ?source + lactate 2.8  - UA, COVID/RVP negative; WBC wnl  - BCx, UCx, procal, tox screen  - Abx - empirically: vanc + ctx + ampicillin + acyclovir started in ED  - d/c vanc, ampicillin, and acyclovir bc decreased concerns for meningitis  - s/p ceftriaxone for treatment of UTI (partially treated prior to admission) 6/5-6/7

## 2023-06-07 NOTE — PROGRESS NOTE ADULT - ASSESSMENT
72F PMH HTN, HLD, DM2, CAD (PCI) presents from home with daughter for AMS (last known normal 9:30AM 6/4/23). Febrile 102.5, , AG 16, lactate 2.8. Admitted for acute metabolic encephalopathy 2/2 suspected infectious etiology (?meningitis) vs. [facility].
72F PMH HTN, HLD, DM2, CAD (PCI) presents from home with daughter for AMS (last known normal 9:30AM 6/4/23). Febrile 102.5, , AG 16, lactate 2.8. Admitted for acute metabolic encephalopathy 2/2 suspected infectious etiology 
72F PMH HTN, HLD, DM2, CAD (PCI) presents from home with daughter for AMS (last known normal 9:30AM 6/4/23). Febrile 102.5, , AG 16, lactate 2.8. Admitted for acute metabolic encephalopathy 2/2 suspected infectious etiology, likely undertreated UTI.

## 2023-06-07 NOTE — PROGRESS NOTE ADULT - SUBJECTIVE AND OBJECTIVE BOX
INTERVAL HPI/OVERNIGHT EVENTS:  Pt seen and examined at bedside. No acute overnight events or complaints.    VITAL SIGNS:  T(F): 97.8 (06-07-23 @ 05:02)  HR: 68 (06-07-23 @ 05:02)  BP: 117/60 (06-07-23 @ 05:02)  RR: 17 (06-07-23 @ 05:02)  SpO2: 97% (06-07-23 @ 05:02)  Wt(kg): --    PHYSICAL EXAM:    Constitutional: NAD  HEENT: PERRL, EOMI, sclera non-icteric, neck supple, trachea midline, no masses, no JVD, MMM, good dentition  Respiratory: CTA b/l, good air entry b/l, no wheezing, no rhonchi, no rales, without accessory muscle use and no intercostal retractions  Cardiovascular: RRR, normal S1S2, no M/R/G  Gastrointestinal: soft, NTND, no masses palpable, BS normal  Extremities: Warm, well perfused, pulses equal bilateral upper and lower extremities, no edema, no clubbing. Capillary refill <2 sec  Neurological: AAOx3, CN Grossly intact  Skin: Normal temperature, warm, dry    MEDICATIONS  (STANDING):  amLODIPine   Tablet 10 milliGRAM(s) Oral daily  aspirin  chewable 81 milliGRAM(s) Oral daily  atorvastatin 10 milliGRAM(s) Oral at bedtime  dextrose 5%. 1000 milliLiter(s) (50 mL/Hr) IV Continuous <Continuous>  dextrose 5%. 1000 milliLiter(s) (100 mL/Hr) IV Continuous <Continuous>  dextrose 50% Injectable 12.5 Gram(s) IV Push once  dextrose 50% Injectable 25 Gram(s) IV Push once  dextrose 50% Injectable 25 Gram(s) IV Push once  DULoxetine 30 milliGRAM(s) Oral daily  gabapentin 600 milliGRAM(s) Oral two times a day  glucagon  Injectable 1 milliGRAM(s) IntraMuscular once  hydrochlorothiazide 12.5 milliGRAM(s) Oral daily  hydrOXYzine hydrochloride Syrup 10 milliGRAM(s) Oral once  insulin glargine Injectable (LANTUS) 20 Unit(s) SubCutaneous at bedtime  insulin lispro (ADMELOG) corrective regimen sliding scale   SubCutaneous three times a day before meals  insulin lispro (ADMELOG) corrective regimen sliding scale   SubCutaneous at bedtime  lactated ringers. 1000 milliLiter(s) (150 mL/Hr) IV Continuous <Continuous>  oxybutynin 5 milliGRAM(s) Oral two times a day  pantoprazole    Tablet 40 milliGRAM(s) Oral before breakfast    MEDICATIONS  (PRN):  acetaminophen     Tablet .. 650 milliGRAM(s) Oral every 6 hours PRN Temp greater or equal to 38C (100.4F), Mild Pain (1 - 3)  dextrose Oral Gel 15 Gram(s) Oral once PRN Blood Glucose LESS THAN 70 milliGRAM(s)/deciliter      Allergies    No Known Allergies    Intolerances        LABS:                        13.2   6.75  )-----------( 187      ( 06 Jun 2023 07:26 )             41.1     06-06    134<L>  |  97<L>  |  17  ----------------------------<  254<H>  3.5   |  25  |  0.94    Ca    8.5      06 Jun 2023 07:26  Phos  2.9     06-06  Mg     1.70     06-06    TPro  6.1  /  Alb  3.0<L>  /  TBili  <0.2  /  DBili  x   /  AST  59<H>  /  ALT  37<H>  /  AlkPhos  98  06-06          RADIOLOGY & ADDITIONAL TESTS:  Reviewed      ******************  Authored By: Marixa Del Rio MD PGY1  Internal Medicine  Pager: [phone] [facility]// [id number] LESLY  MS Teams Preferred  ******************

## 2023-06-07 NOTE — PHARMACOTHERAPY INTERVENTION NOTE - COMMENTS
Patient Emma is a 72-year-old female with PMH of HTN, HLD, DM2 with A1c 14%, and CAD s/p PCI. She presents to the [facility] due to UTI. Although the patient has had a history of UTIs before, this is her first admission to the hospital for this condition. The patient reports adhering to all her diabetes medications, including metformin, Januvia, and insulin Basaglar. She mentions that her insulin dose was recently increased from 10 units to 20 units at night. Typically, she takes her insulin at nighttime around 11 pm and wakes up at approximately 11 am. Her first meal of the day is around 1 pm, which she usually has with her daughter. Dinner is consumed around 7 pm. Neither the patient nor her daughter cooks at home; they frequently eat out (salads and soups). Patient was suggested to eat small snacks in the morning when wakeing up. Patient verbally agree to try it. The patient reports not engaging in regular exercise due to knee pain. She also mentions being unable to receive injections due to her blood glucose levels not being within the target range. Additionally, her primary care doctor has prescribed her Rybelsus, but its approval is pending from the insurance provider. Regarding her glucose levels, the patient reports readings between 250 and 300 and denies experiencing any hypoglycemic events. She does not check her glucose levels regularly due to pain, although she mentions having a CGM device at home (unknown model). The patient was verbally counseled on how to apply the CGM device, she was also instructed to call [email or web address] service for any questions regarding the CGM. She received education on A1c levels, insulin pen administration, hypoglycemia treatment, healthy meal planning, exercise, and when to check blood glucose levels. During the counseling session, the patient successfully demonstrated the insulin injection technique. She was counseled to rotate injection sites due to the presence of lipodystrophy , to hold the injection for 10 seconds to ensure the complete delivery of insulin into the body, and proper dispose used needle to a sealed plastic container after injection. The patient showed understanding and performed a satisfactory return demonstration. She reports regular follow-up visits with an ophthalmologist and a podiatrist. Patient was hesitant to start premeal insulin, but it was recommended this is the best to control her blood glucose at this time. Patients' discharged date unknown and encouraged to schedule an appointment with an outpatient doctor, and she agreed to send her blood glucose log to the provider. Her daughter works at patient's PCP office which will help facilitate with the outpatient appointment. All questions were answered during the session.

## 2023-06-07 NOTE — DISCHARGE NOTE PROVIDER - NSDCCPCAREPLAN_GEN_ALL_CORE_FT
PRINCIPAL DISCHARGE DIAGNOSIS  Diagnosis: Altered mental status  Assessment and Plan of Treatment: You came to the hospital because you were found by your daughter to be more confused than usual. You had head imaging with a CT scan that was negative for any injury or stroke. We suspect the reason for your symptoms was due to high glucose levels that were likely due to an incompletely treated urinary tract infection. You were given 3 days of intravenous antibiotics (ceftriaxone) and your mental status continued to improve back to its baseline.      SECONDARY DISCHARGE DIAGNOSES  Diagnosis: Hyperglycemia  Assessment and Plan of Treatment: You were found to have elevated blood sugar levels in the hospital.  Take your prescribed medications as directed by your healthcare provider, including insulin or oral medications. Your nightly insulin was increased to 25 units every night and you will resume your metformin and januvia. We recommend monitoring your blood glucose levels regularly and keep a record of the readings. Please follow up with your primary care physician for further optimization of your diabetes medications.

## 2023-06-07 NOTE — PROGRESS NOTE ADULT - ATTENDING COMMENTS
72W w/ HTN, DM2 (A1c 13.7), CAD s/p PCI presenting with fever and acute encephalopathy x1 day in context of recent urinary symptoms incompletely treated with nitrofurantoin - now receiving CTX for UTI and back to baseline. Course also notable for severe hyperglycemia with BG in the 500s, now improving.     UCx returned with mixed aimee - likely low yield given patient had received abx as outpatient prior to admission  Complete 3 days of CTX for UTI  BG improving, but will above goal - will uptitrate insulin
72W w/ HTN, DM2 (A1c 13.7), CAD s/p PCI presenting with fever and acute encephalopathy x1 day in context of recent urinary symptoms incompletely treated with nitrofurantoin - now receiving CTX for UTI and back to baseline. Course also notable for severe hyperglycemia with BG in the 500s, now improving.     UCx returned with mixed aimee - likely low yield given patient had received abx as outpatient prior to admission  Completed 3 days of CTX for UTI  BG improving, but will above goal - discharge on lantus 25 units  met with patient and daughter at bedside prior to discharge - all questions answered.
72W w/ HTN, DM2 (A1c 13.7), CAD s/p PCI presenting with fever and acute encephalopathy x1 day --- seen at bedside with patient's daughter. Patient and daughter both report that she is now feeling back to baseline mental status. Patient's daughter describe dysuria and urinary urgency pre-dating the mental status change, and patient took 2 doses of macrobid prior to coming to the ED. No headaches or neck stiffness. Equivocal UTI might be explained by antibiotics (macrobid) prior to collection. Overall presentation most consistent with incompletely treated UTI leading to fever and septic encephalopathy. Will d/c empiric meningitis treatment, continue ceftriaxone to treat possible UTI.   Increase insulin to treat hyperglycemia.

## 2023-06-07 NOTE — DISCHARGE NOTE NURSING/CASE MANAGEMENT/SOCIAL WORK - PATIENT PORTAL LINK FT
You can access the FollowMyHealth Patient Portal offered by [facility] by registering at the following website: [email or web address]. By joining [employer]’s FollowMyHealth portal, you will also be able to view your health information using other applications (apps) compatible with our system.

## 2023-06-07 NOTE — DISCHARGE NOTE PROVIDER - NSDCCPTREATMENT_GEN_ALL_CORE_FT
PRINCIPAL PROCEDURE  Procedure: CT head  Findings and Treatment: FINDINGS:  There is no CT evidence of acute transcortical infarct. Age-related   involutional changes and chronic microvascular ischemic changes.  There is no hydrocephalus, mass effect, or acute intracranial hemorrhage.   No extra-axial collection. Basal cisterns are patent.  The visualized paranasal sinuses and mastoid air cells are clear.  The calvarium is intact.  IMPRESSION:  No evidence of acute transcortical infarct, acute intracranial   hemorrhage, or mass effect.

## 2023-06-07 NOTE — PROGRESS NOTE ADULT - PROBLEM SELECTOR PLAN 2
home meds: basaglar 20, metformin 1000 bid, januvia 100  - A1C: 13.7  - VBG: pH 7.42, , bHb 0.3, +AMS  - fluids: d5 1/2 NS at 150cc/hr   - insulin 20 units home meds: basaglar 20, metformin 1000 bid, januvia 100  - A1C: 13.7  - VBG: pH 7.42, , bHb 0.3, +AMS  - fluids: d5 1/2 NS at 150cc/hr   - increased insulin to 25 units

## 2023-06-07 NOTE — DISCHARGE NOTE PROVIDER - HOSPITAL COURSE
72F PMH HTN, HLD, DM2, CAD (PCI) presents from home with daughter for AMS (last known normal 9:30AM 6/4/23). Per daughter, patient became increasingly confused throughout the day - while pt is A&Ox4 and ambulates without assistance at baseline. Pt was recently treated for a possible UTI with macrobid by PCP - 2 doses taken yesterday. Pt hasn't been complaining of anything recently at home, daughter hasn't noted any fevers/chills, n/v, cough/congestion, CP/SOB, new swelling or skin changes, or prior history of a similar episode.    In ED:  - Febrile (rectal) to 102.5, WBC 9.5, procal 0.35, lactate 2.8. Glucose 574, AG 16, bHb 0.3.  - CXR and CT-head wnl  - LP unsuccessful >> empiric tx for meningitis started (vanc, ctx, amp, acyclovir)  - Tylenol 1g, LR 2L, 5U admelog.    Patient was admitted to medicine. Given patient's lack of leukocytosis and fever as well as improving mental status and lack of neck stiffness, patient was considered less likely to have meningitis and vancomycin/ampicillin/acyclovir were discontinued. Patient was started on basal insulin with sliding scale which improved her glucose levels. She was thought to have altered mental status due to her hyperglycemic state i/s/o incompletely treated UTI and was continued on ceftriaxone for 3 day course. Mental status continued to improve back to baseline by day 3 of ceftriaxone and patient was deemed stable for discharge home. 72F PMH HTN, HLD, DM2, CAD (PCI) presents from home with daughter for AMS (last known normal 9:30AM 6/4/23). Per daughter, patient became increasingly confused throughout the day - while pt is A&Ox4 and ambulates without assistance at baseline. Pt was recently treated for a possible UTI with macrobid by PCP - 2 doses taken yesterday. Pt hasn't been complaining of anything recently at home, daughter hasn't noted any fevers/chills, n/v, cough/congestion, CP/SOB, new swelling or skin changes, or prior history of a similar episode.    In ED:  - Febrile (rectal) to 102.5, WBC 9.5, procal 0.35, lactate 2.8. Glucose 574, AG 16, bHb 0.3.  - CXR and CT-head wnl  - LP unsuccessful >> empiric tx for meningitis started (vanc, ctx, amp, acyclovir)  - Tylenol 1g, LR 2L, 5U admelog.    Patient was admitted to medicine. Given patient's lack of leukocytosis and fever as well as improving mental status and lack of neck stiffness, patient was considered less likely to have meningitis and vancomycin/ampicillin/acyclovir were discontinued. Patient and daugther shared that she had dysuria and urinary urgency prior to admission and had taken 2 doses of macrobid prior ot admission - possibly accounting for negative UA and UCx with mixed aimee. Patient was started on basal insulin with sliding scale which improved her glucose levels. HgbA1c 13.7% on admission. Lantus was increased from 20U daily (recently increased by PCP from 10U - 20U) to 24 units on discharge. She was thought to have altered mental status due to her hyperglycemic state i/s/o incompletely treated UTI and was continued on ceftriaxone for 3 day course. Mental status continued to improve back to baseline by day 3 of ceftriaxone and patient was deemed stable for discharge home.

## 2023-06-07 NOTE — DISCHARGE NOTE PROVIDER - CARE PROVIDER_API CALL
Adam Salgado  Internal Medicine  [address] [place] [address]  Phone: [phone]  Fax: [phone]  Follow Up Time:

## 2023-06-07 NOTE — DISCHARGE NOTE PROVIDER - NSFOLLOWUPCLINICS_GEN_ALL_ED_FT
[facility]  Endocrinology  [address] [place] [address]  Phone: [phone]  Fax:

## 2023-06-07 NOTE — DISCHARGE NOTE NURSING/CASE MANAGEMENT/SOCIAL WORK - NSDCPEFALRISK_GEN_ALL_CORE
For information on Fall & Injury Prevention, visit: [email or web address] OR  [email or web address] OR  https://www.cdc.gov/steadi/patient.html

## 2023-06-07 NOTE — DISCHARGE NOTE PROVIDER - NSDCMRMEDTOKEN_GEN_ALL_CORE_FT
amLODIPine 10 mg oral tablet: 1 tab(s) orally once a day  aspirin 81 mg oral tablet: 1 tab(s) orally once a day  Basaglar KwikPen 100 units/mL subcutaneous solution: 25 unit(s) subcutaneous once a day (at bedtime)  dexlansoprazole 60 mg oral delayed release capsule: 1 orally once a day  DULoxetine 30 mg oral delayed release capsule: 1 cap(s) orally once a day  gabapentin 600 mg oral tablet: 1 tab(s) orally 2 times a day  hydroCHLOROthiazide 12.5 mg oral tablet: 1 tab(s) orally once a day  hydrOXYzine hydrochloride 10 mg oral tablet: 1 orally once a day (at bedtime)  Januvia 100 mg oral tablet: 1 orally once a day  lysine 500 mg oral tablet: 1 tab(s) orally once a day  metFORMIN 1000 mg oral tablet: 1 tab(s) orally 2 times a day  pravastatin 40 mg oral tablet: 1 tab(s) orally once a day  tolterodine 4 mg oral capsule, extended release: 1 orally once a day

## 2023-06-10 LAB
CULTURE RESULTS: SIGNIFICANT CHANGE UP
CULTURE RESULTS: SIGNIFICANT CHANGE UP
SPECIMEN SOURCE: SIGNIFICANT CHANGE UP
SPECIMEN SOURCE: SIGNIFICANT CHANGE UP

## 2023-06-12 LAB — ZINC TRANSPORTER 8 AB, RESULT: <15 U/ML — SIGNIFICANT CHANGE UP

## 2023-10-04 ENCOUNTER — INPATIENT (INPATIENT)
Facility: HOSPITAL | Age: 73
LOS: 3 days | Discharge: HOME | End: 2023-10-08
Attending: STUDENT IN AN ORGANIZED HEALTH CARE EDUCATION/TRAINING PROGRAM | Admitting: STUDENT IN AN ORGANIZED HEALTH CARE EDUCATION/TRAINING PROGRAM
Payer: MEDICARE

## 2023-10-04 VITALS
OXYGEN SATURATION: 100 % | HEART RATE: 95 BPM | RESPIRATION RATE: 18 BRPM | TEMPERATURE: 99 F | DIASTOLIC BLOOD PRESSURE: 78 MMHG | SYSTOLIC BLOOD PRESSURE: 136 MMHG

## 2023-10-04 DIAGNOSIS — Z98.890 OTHER SPECIFIED POSTPROCEDURAL STATES: Chronic | ICD-10-CM

## 2023-10-04 DIAGNOSIS — Z98.891 HISTORY OF UTERINE SCAR FROM PREVIOUS SURGERY: Chronic | ICD-10-CM

## 2023-10-04 DIAGNOSIS — Z90.49 ACQUIRED ABSENCE OF OTHER SPECIFIED PARTS OF DIGESTIVE TRACT: Chronic | ICD-10-CM

## 2023-10-04 LAB
A1C WITH ESTIMATED AVERAGE GLUCOSE RESULT: 15.7 % — HIGH (ref 4–5.6)
ALBUMIN SERPL ELPH-MCNC: 3.6 G/DL — SIGNIFICANT CHANGE UP (ref 3.3–5)
ALP SERPL-CCNC: 77 U/L — SIGNIFICANT CHANGE UP (ref 40–120)
ALT FLD-CCNC: 18 U/L — SIGNIFICANT CHANGE UP (ref 4–33)
ANION GAP SERPL CALC-SCNC: 12 MMOL/L — SIGNIFICANT CHANGE UP (ref 7–14)
APTT BLD: 26.8 SEC — SIGNIFICANT CHANGE UP (ref 24.5–35.6)
AST SERPL-CCNC: 21 U/L — SIGNIFICANT CHANGE UP (ref 4–32)
B-OH-BUTYR SERPL-SCNC: 0.5 MMOL/L — HIGH (ref 0–0.4)
BASE EXCESS BLDV CALC-SCNC: 4 MMOL/L — HIGH (ref -2–3)
BASOPHILS # BLD AUTO: 0.06 K/UL — SIGNIFICANT CHANGE UP (ref 0–0.2)
BASOPHILS NFR BLD AUTO: 0.4 % — SIGNIFICANT CHANGE UP (ref 0–2)
BILIRUB SERPL-MCNC: 0.5 MG/DL — SIGNIFICANT CHANGE UP (ref 0.2–1.2)
BLOOD GAS VENOUS COMPREHENSIVE RESULT: SIGNIFICANT CHANGE UP
BUN SERPL-MCNC: 17 MG/DL — SIGNIFICANT CHANGE UP (ref 7–23)
CALCIUM SERPL-MCNC: 9.2 MG/DL — SIGNIFICANT CHANGE UP (ref 8.4–10.5)
CHLORIDE BLDV-SCNC: 95 MMOL/L — LOW (ref 96–108)
CHLORIDE SERPL-SCNC: 97 MMOL/L — LOW (ref 98–107)
CK MB BLD-MCNC: 3.7 % — HIGH (ref 0–2.5)
CK MB CFR SERPL CALC: 1.8 NG/ML — SIGNIFICANT CHANGE UP
CK SERPL-CCNC: 49 U/L — SIGNIFICANT CHANGE UP (ref 25–170)
CO2 BLDV-SCNC: 30.6 MMOL/L — HIGH (ref 22–26)
CO2 SERPL-SCNC: 25 MMOL/L — SIGNIFICANT CHANGE UP (ref 22–31)
CREAT SERPL-MCNC: 0.93 MG/DL — SIGNIFICANT CHANGE UP (ref 0.5–1.3)
EGFR: 65 ML/MIN/1.73M2 — SIGNIFICANT CHANGE UP
EOSINOPHIL # BLD AUTO: 0.01 K/UL — SIGNIFICANT CHANGE UP (ref 0–0.5)
EOSINOPHIL NFR BLD AUTO: 0.1 % — SIGNIFICANT CHANGE UP (ref 0–6)
ESTIMATED AVERAGE GLUCOSE: 404 — SIGNIFICANT CHANGE UP
GAS PNL BLDV: 130 MMOL/L — LOW (ref 136–145)
GLUCOSE BLDV-MCNC: 481 MG/DL — CRITICAL HIGH (ref 70–99)
GLUCOSE SERPL-MCNC: 436 MG/DL — HIGH (ref 70–99)
HCO3 BLDV-SCNC: 29 MMOL/L — SIGNIFICANT CHANGE UP (ref 22–29)
HCT VFR BLD CALC: 39.6 % — SIGNIFICANT CHANGE UP (ref 34.5–45)
HCT VFR BLDA CALC: 42 % — SIGNIFICANT CHANGE UP (ref 34.5–46.5)
HGB BLD CALC-MCNC: 14.1 G/DL — SIGNIFICANT CHANGE UP (ref 11.7–16.1)
HGB BLD-MCNC: 13.6 G/DL — SIGNIFICANT CHANGE UP (ref 11.5–15.5)
IANC: 12.3 K/UL — HIGH (ref 1.8–7.4)
IMM GRANULOCYTES NFR BLD AUTO: 0.4 % — SIGNIFICANT CHANGE UP (ref 0–0.9)
INR BLD: 0.95 RATIO — SIGNIFICANT CHANGE UP (ref 0.85–1.18)
LACTATE BLDV-MCNC: 2.4 MMOL/L — HIGH (ref 0.5–2)
LYMPHOCYTES # BLD AUTO: 0.4 K/UL — LOW (ref 1–3.3)
LYMPHOCYTES # BLD AUTO: 3 % — LOW (ref 13–44)
MCHC RBC-ENTMCNC: 29.1 PG — SIGNIFICANT CHANGE UP (ref 27–34)
MCHC RBC-ENTMCNC: 34.3 GM/DL — SIGNIFICANT CHANGE UP (ref 32–36)
MCV RBC AUTO: 84.8 FL — SIGNIFICANT CHANGE UP (ref 80–100)
MONOCYTES # BLD AUTO: 0.6 K/UL — SIGNIFICANT CHANGE UP (ref 0–0.9)
MONOCYTES NFR BLD AUTO: 4.5 % — SIGNIFICANT CHANGE UP (ref 2–14)
NEUTROPHILS # BLD AUTO: 12.3 K/UL — HIGH (ref 1.8–7.4)
NEUTROPHILS NFR BLD AUTO: 91.6 % — HIGH (ref 43–77)
NRBC # BLD: 0 /100 WBCS — SIGNIFICANT CHANGE UP (ref 0–0)
NRBC # FLD: 0 K/UL — SIGNIFICANT CHANGE UP (ref 0–0)
PCO2 BLDV: 45 MMHG — SIGNIFICANT CHANGE UP (ref 39–52)
PH BLDV: 7.42 — SIGNIFICANT CHANGE UP (ref 7.32–7.43)
PLATELET # BLD AUTO: 183 K/UL — SIGNIFICANT CHANGE UP (ref 150–400)
PO2 BLDV: 26 MMHG — SIGNIFICANT CHANGE UP (ref 25–45)
POTASSIUM BLDV-SCNC: 3.9 MMOL/L — SIGNIFICANT CHANGE UP (ref 3.5–5.1)
POTASSIUM SERPL-MCNC: 4 MMOL/L — SIGNIFICANT CHANGE UP (ref 3.5–5.3)
POTASSIUM SERPL-SCNC: 4 MMOL/L — SIGNIFICANT CHANGE UP (ref 3.5–5.3)
PROT SERPL-MCNC: 6.7 G/DL — SIGNIFICANT CHANGE UP (ref 6–8.3)
PROTHROM AB SERPL-ACNC: 10.7 SEC — SIGNIFICANT CHANGE UP (ref 9.5–13)
RBC # BLD: 4.67 M/UL — SIGNIFICANT CHANGE UP (ref 3.8–5.2)
RBC # FLD: 13.1 % — SIGNIFICANT CHANGE UP (ref 10.3–14.5)
SAO2 % BLDV: 39.3 % — LOW (ref 67–88)
SODIUM SERPL-SCNC: 134 MMOL/L — LOW (ref 135–145)
TROPONIN T, HIGH SENSITIVITY RESULT: 106 NG/L — CRITICAL HIGH
WBC # BLD: 13.42 K/UL — HIGH (ref 3.8–10.5)
WBC # FLD AUTO: 13.42 K/UL — HIGH (ref 3.8–10.5)

## 2023-10-04 PROCEDURE — 99285 EMERGENCY DEPT VISIT HI MDM: CPT | Mod: GC

## 2023-10-04 PROCEDURE — 71045 X-RAY EXAM CHEST 1 VIEW: CPT | Mod: 26

## 2023-10-04 PROCEDURE — 70450 CT HEAD/BRAIN W/O DYE: CPT | Mod: 26,MA

## 2023-10-04 RX ORDER — SODIUM CHLORIDE 9 MG/ML
1000 INJECTION INTRAMUSCULAR; INTRAVENOUS; SUBCUTANEOUS ONCE
Refills: 0 | Status: COMPLETED | OUTPATIENT
Start: 2023-10-04 | End: 2023-10-04

## 2023-10-04 RX ORDER — ASPIRIN/CALCIUM CARB/MAGNESIUM 324 MG
324 TABLET ORAL ONCE
Refills: 0 | Status: COMPLETED | OUTPATIENT
Start: 2023-10-04 | End: 2023-10-04

## 2023-10-04 RX ORDER — MORPHINE SULFATE 50 MG/1
4 CAPSULE, EXTENDED RELEASE ORAL ONCE
Refills: 0 | Status: DISCONTINUED | OUTPATIENT
Start: 2023-10-04 | End: 2023-10-04

## 2023-10-04 RX ORDER — ACETAMINOPHEN 500 MG
1000 TABLET ORAL ONCE
Refills: 0 | Status: COMPLETED | OUTPATIENT
Start: 2023-10-04 | End: 2023-10-04

## 2023-10-04 RX ORDER — ONDANSETRON 8 MG/1
4 TABLET, FILM COATED ORAL ONCE
Refills: 0 | Status: COMPLETED | OUTPATIENT
Start: 2023-10-04 | End: 2023-10-04

## 2023-10-04 RX ADMIN — ONDANSETRON 4 MILLIGRAM(S): 8 TABLET, FILM COATED ORAL at 22:15

## 2023-10-04 RX ADMIN — SODIUM CHLORIDE 1000 MILLILITER(S): 9 INJECTION INTRAMUSCULAR; INTRAVENOUS; SUBCUTANEOUS at 20:49

## 2023-10-04 RX ADMIN — MORPHINE SULFATE 4 MILLIGRAM(S): 50 CAPSULE, EXTENDED RELEASE ORAL at 19:39

## 2023-10-04 RX ADMIN — Medication 324 MILLIGRAM(S): at 21:27

## 2023-10-04 RX ADMIN — Medication 400 MILLIGRAM(S): at 21:27

## 2023-10-04 NOTE — ED PROVIDER NOTE - OBJECTIVE STATEMENT
Dr Bobo  73-year-old female history of CAD with stents on aspirin, diabetes on insulin, hypertension, high cholesterol, CKD brought in by EMS from home with daughter at bedside for altered mental status.  States she left her at home at 9 AM last known normal patient went to work, did not speak to her throughout the day, when she got home this afternoon she was still in bed and confused.  Patient altered, requiring assistance walking to the bathroom.  Daughter called EMS.  Code stroke was called in triage neurology at bedside.  Patient intermittently answering questions following some commands but no focal deficits.  No sign of head trauma.  Daughter states that she was recently found to have a UTI and was hyperglycemic and in the past she has been confused with elevated blood glucose.

## 2023-10-04 NOTE — ED PROVIDER NOTE - CLINICAL SUMMARY MEDICAL DECISION MAKING FREE TEXT BOX
Code stroke was canceled per neurology we will continue to follow.  Daughter states that she has bad CKD and cannot get contrast therefore we will proceed with a CT head noncontrast.  Noted to have a fever in triage we will proceed with sepsis work-up and rule out DKA as well.  EKG sr hr 95 unchanged from prior

## 2023-10-04 NOTE — ED PROVIDER NOTE - PHYSICAL EXAMINATION
Dr Bobo  Patient alert to self and daughter following some commands.  Moving all extremities.  Complaining of back pain (daughter states she always has back pain) has no work of breathing.  No grimace to palpation of abdomen.  Moving all extremities.

## 2023-10-04 NOTE — ED ADULT NURSE NOTE - OBJECTIVE STATEMENT
Daughter at bedside. Patient Received from Tye YOUSSEF. Patient came in AMS and hyperglycemia. Specimens collected and sent By Tye youssef. Report given to Anushka youssef

## 2023-10-04 NOTE — CONSULT NOTE ADULT - SUBJECTIVE AND OBJECTIVE BOX
Neurology - Consult Note    -  Spectra: [phone] ([facility]), [phone] ([facility])  -    HPI: Patient TI RYAN is a 73y (1950) woman with a PMHx significant for CAD s/p stents on aspirin, CKD, DM on insulin, HTN, HLD presenting with AMS. LKW 9 am. Daughter came home from work and mother was found confused. Patient recently having urinary issues and was started on microbid first does was last night. Daughter reports almost identical incident occurred this past june. Code stroke called for AMS. Code cancelled due to nonfocal exam, glucose >600, recent UTI infection. Daughter reports contrast allergy and CKD. Daughter reports she looks slightly less confused since her last ED visit. Baseline patient walks with a walker, administers her own meds and insulin, cooks, bathes herself on her own. mRs 1. PT denies unilateral weakness, numbness, vision changes.     Review of Systems: refer to HPI    Allergies:  No Known Allergies    PMHx/PSHx/Family Hx: As above, otherwise see below   Hypertension  Diabetes  Hyperlipemia  CAD S/P percutaneous coronary angioplasty  Osteoarthritis  Urinary incontinence in female    Social Hx:  No current use of tobacco, alcohol, or illicit drugs  Lives with daughter    Medications:  MEDICATIONS  (STANDING):  MEDICATIONS  (PRN):    Vitals:  T(C): 37.4 (10-04-23 @ 18:11), Max: 37.4 (10-04-23 @ 18:11)  HR: 101 (10-04-23 @ 19:42) (95 - 101)  BP: 124/94 (10-04-23 @ 19:42) (124/94 - 136/78)  RR: 22 (10-04-23 @ 19:42) (18 - 22)  SpO2: 100% (10-04-23 @ 19:42) (100% - 100%)    Physical Examination:   General - awake but lethargic and slightly confused    Neurologic Exam:  Mental status - Awake, Alert, Oriented to self, location (hospital). Speech is not fluent, only answers in one word answers. Does answer appropriately some simple questions such as name, age, location, yes or no. Can only repeat simple phrases such as mama, was about to name phone correctly. Follows intermittently follows simple motor commands.    Cranial nerves - PERRLA, VFF, EOMI, face sensation (V1-V3) intact b/l, facial strength intact without asymmetry b/l, hearing intact b/l, palate with symmetric elevation, trapezius 5/5 strength b/l, tongue midline on protrusion with full lateral movement    Motor - Normal bulk and tone throughout.   Strength testing         Biceps      Triceps            R            5                 5               5              L             5                 5               5                                Dorsiflexion    Plantar Flexion  daughter reports leg pain from arthritis in addition to AMS, could not obtain isolated LE muscle groups  R              5                           5                    MOves b/l LE symmetrically in plane of bed  L              5                           5                  Sensation - Light touch intact throughout    DTR's -             Biceps      Triceps     Brachioradialis      Patellar    Ankle    Toes/plantar response  R             2+             2+                  2+                       2+            2+                 Down  L              2+             2+                 2+                        2+           2+                 Down    Coordination - unable to preform     Gait and station - deferred     Labs:                        13.6   13.42 )-----------( 183      ( 04 Oct 2023 19:20 )             39.6     CAPILLARY BLOOD GLUCOSE  600 (04 Oct 2023 19:39)    POCT Blood Glucose.: 540 mg/dL (04 Oct 2023 18:25)    Radiology:

## 2023-10-04 NOTE — ED PROVIDER NOTE - PROGRESS NOTE DETAILS
pt more alert, sitting up, pending rpt trop and tba. pt and daughter updated, elevated trop. pt has no cp. no sob. ekg SR no SACHIN. consult cardiology Patient and daughter updated.  Patient resting comfortably.  More interactive with daughter, states she is improved.  Aware pending cardiology consult for elevated troponin.  Pending UA to result.  RVP ordered.  Will sign out to night team.  End of shift. Joshua Denney PGY3: admission accepted, placing lantus order and TTE order after discussion with hospitalist.

## 2023-10-04 NOTE — ED ADULT TRIAGE NOTE - CHIEF COMPLAINT QUOTE
As per EMT" Daughter called reporting sugars are high, on scene [id number]. She is being treated for UTI.." As per Daughter" This afternoon she is completely out of it , not answering questions, she is sleeping. Not eating since dinner last night.....usually walks around with cane today very unsteady. This morning she was fine at 9am ...when I came home for lunch she is not acting herself." Pt unable to follow RN's stroke assesment. MD Pulido called for eval. Code stroke called.

## 2023-10-04 NOTE — CONSULT NOTE ADULT - ASSESSMENT
Assessment:73y (1950) woman with a PMHx significant for CAD s/p stents on aspirin, CKD, DM on insulin, HTN, HLD presenting with AMS. LKW 9 am. Daughter came home from work and mother was found confused. Patient recently having urinary issues and was started on microbid first does was last night. Daughter reports almost identical incident occurred this past june. Code stroke called for AMS. Code cancelled due to nonfocal exam, glucose >600, recent UTI infection. Daughter reports contrast allergy and CKD. Daughter reports she looks slightly less confused since her last ED visit. Baseline patient walks with a walker, administers her own meds and insulin, cooks, bathes herself on her own. mRs 1. PT denies unilateral weakness, numbness, vision changes. Neurological exam nonfocal. CTH unable to obtain due back pain lying flat.      Impression: AMS in the setting of glucose >600 & UTI likely causing patient to be encephalopathic    Plan  [] glucose management per primary team  [] UTI management per primary team  [] follow up CTH images  [] MRI brain without if AMS does not improve with medical treatment      Case to be seen and discussed with neurology attending in am  Assessment:73y (1950) woman with a PMHx significant for CAD s/p stents on aspirin, CKD, DM on insulin, HTN, HLD presenting with AMS. LKW 9 am. Daughter came home from work and mother was found confused. Patient recently having urinary issues and was started on microbid first does was last night. Daughter reports almost identical incident occurred this past june. Code stroke called for AMS. Code cancelled due to nonfocal exam, glucose >600, recent UTI infection. Daughter reports contrast allergy and CKD. Daughter reports she looks slightly less confused since her last ED visit. Baseline patient walks with a walker, administers her own meds and insulin, cooks, bathes herself on her own. mRs 1. PT denies unilateral weakness, numbness, vision changes. Neurological exam nonfocal. CTH unable to obtain due back pain lying flat.      Impression: AMS in the setting of glucose >600 & UTI likely causing patient to be encephalopathic    Plan  [] glucose management per primary team  [] UTI management per primary team  [x] follow up CTH images  No further inpatient neurological work-up required at this time. Neurology signing off. Please call [phone] with any questions        Case seen and discussed with neurology attending, Dr. Orlando

## 2023-10-04 NOTE — ED ADULT TRIAGE NOTE - NSTRIAGECARE_GEN_A_ER
Charge MIKAEL Meeks notified Pt altered, FBS HI,  MD Sanchez called for eval "Code stroke "called/EKG

## 2023-10-05 DIAGNOSIS — E11.01 TYPE 2 DIABETES MELLITUS WITH HYPEROSMOLARITY WITH COMA: ICD-10-CM

## 2023-10-05 DIAGNOSIS — I25.10 ATHEROSCLEROTIC HEART DISEASE OF NATIVE CORONARY ARTERY WITHOUT ANGINA PECTORIS: ICD-10-CM

## 2023-10-05 DIAGNOSIS — N32.81 OVERACTIVE BLADDER: ICD-10-CM

## 2023-10-05 DIAGNOSIS — G93.40 ENCEPHALOPATHY, UNSPECIFIED: ICD-10-CM

## 2023-10-05 DIAGNOSIS — Z29.9 ENCOUNTER FOR PROPHYLACTIC MEASURES, UNSPECIFIED: ICD-10-CM

## 2023-10-05 DIAGNOSIS — I10 ESSENTIAL (PRIMARY) HYPERTENSION: ICD-10-CM

## 2023-10-05 DIAGNOSIS — E11.9 TYPE 2 DIABETES MELLITUS WITHOUT COMPLICATIONS: ICD-10-CM

## 2023-10-05 DIAGNOSIS — E78.49 OTHER HYPERLIPIDEMIA: ICD-10-CM

## 2023-10-05 DIAGNOSIS — R41.82 ALTERED MENTAL STATUS, UNSPECIFIED: ICD-10-CM

## 2023-10-05 DIAGNOSIS — R79.89 OTHER SPECIFIED ABNORMAL FINDINGS OF BLOOD CHEMISTRY: ICD-10-CM

## 2023-10-05 DIAGNOSIS — E78.5 HYPERLIPIDEMIA, UNSPECIFIED: ICD-10-CM

## 2023-10-05 LAB
ANION GAP SERPL CALC-SCNC: 14 MMOL/L — SIGNIFICANT CHANGE UP (ref 7–14)
APPEARANCE UR: CLEAR — SIGNIFICANT CHANGE UP
B PERT DNA SPEC QL NAA+PROBE: SIGNIFICANT CHANGE UP
B PERT+PARAPERT DNA PNL SPEC NAA+PROBE: SIGNIFICANT CHANGE UP
BACTERIA # UR AUTO: NEGATIVE /HPF — SIGNIFICANT CHANGE UP
BILIRUB UR-MCNC: NEGATIVE — SIGNIFICANT CHANGE UP
BORDETELLA PARAPERTUSSIS (RAPRVP): SIGNIFICANT CHANGE UP
BUN SERPL-MCNC: 17 MG/DL — SIGNIFICANT CHANGE UP (ref 7–23)
C PNEUM DNA SPEC QL NAA+PROBE: SIGNIFICANT CHANGE UP
CALCIUM SERPL-MCNC: 8.6 MG/DL — SIGNIFICANT CHANGE UP (ref 8.4–10.5)
CAST: 1 /LPF — SIGNIFICANT CHANGE UP (ref 0–4)
CHLORIDE SERPL-SCNC: 96 MMOL/L — LOW (ref 98–107)
CHOLEST SERPL-MCNC: 127 MG/DL — SIGNIFICANT CHANGE UP
CO2 SERPL-SCNC: 23 MMOL/L — SIGNIFICANT CHANGE UP (ref 22–31)
COLOR SPEC: YELLOW — SIGNIFICANT CHANGE UP
CREAT SERPL-MCNC: 0.87 MG/DL — SIGNIFICANT CHANGE UP (ref 0.5–1.3)
DIFF PNL FLD: NEGATIVE — SIGNIFICANT CHANGE UP
EGFR: 70 ML/MIN/1.73M2 — SIGNIFICANT CHANGE UP
FLUAV SUBTYP SPEC NAA+PROBE: SIGNIFICANT CHANGE UP
FLUBV RNA SPEC QL NAA+PROBE: SIGNIFICANT CHANGE UP
GLUCOSE BLDC GLUCOMTR-MCNC: 228 MG/DL — HIGH (ref 70–99)
GLUCOSE BLDC GLUCOMTR-MCNC: 263 MG/DL — HIGH (ref 70–99)
GLUCOSE BLDC GLUCOMTR-MCNC: 348 MG/DL — HIGH (ref 70–99)
GLUCOSE BLDC GLUCOMTR-MCNC: 408 MG/DL — HIGH (ref 70–99)
GLUCOSE BLDC GLUCOMTR-MCNC: 418 MG/DL — HIGH (ref 70–99)
GLUCOSE BLDC GLUCOMTR-MCNC: 423 MG/DL — HIGH (ref 70–99)
GLUCOSE BLDC GLUCOMTR-MCNC: 530 MG/DL — CRITICAL HIGH (ref 70–99)
GLUCOSE SERPL-MCNC: 478 MG/DL — CRITICAL HIGH (ref 70–99)
GLUCOSE UR QL: >=1000 MG/DL
HADV DNA SPEC QL NAA+PROBE: SIGNIFICANT CHANGE UP
HCOV 229E RNA SPEC QL NAA+PROBE: SIGNIFICANT CHANGE UP
HCOV HKU1 RNA SPEC QL NAA+PROBE: SIGNIFICANT CHANGE UP
HCOV NL63 RNA SPEC QL NAA+PROBE: SIGNIFICANT CHANGE UP
HCOV OC43 RNA SPEC QL NAA+PROBE: SIGNIFICANT CHANGE UP
HCT VFR BLD CALC: 40.4 % — SIGNIFICANT CHANGE UP (ref 34.5–45)
HDLC SERPL-MCNC: 41 MG/DL — LOW
HGB BLD-MCNC: 13.2 G/DL — SIGNIFICANT CHANGE UP (ref 11.5–15.5)
HMPV RNA SPEC QL NAA+PROBE: SIGNIFICANT CHANGE UP
HPIV1 RNA SPEC QL NAA+PROBE: SIGNIFICANT CHANGE UP
HPIV2 RNA SPEC QL NAA+PROBE: SIGNIFICANT CHANGE UP
HPIV3 RNA SPEC QL NAA+PROBE: SIGNIFICANT CHANGE UP
HPIV4 RNA SPEC QL NAA+PROBE: SIGNIFICANT CHANGE UP
KETONES UR-MCNC: 15 MG/DL
LEUKOCYTE ESTERASE UR-ACNC: NEGATIVE — SIGNIFICANT CHANGE UP
LIPID PNL WITH DIRECT LDL SERPL: 46 MG/DL — SIGNIFICANT CHANGE UP
M PNEUMO DNA SPEC QL NAA+PROBE: SIGNIFICANT CHANGE UP
MAGNESIUM SERPL-MCNC: 1.6 MG/DL — SIGNIFICANT CHANGE UP (ref 1.6–2.6)
MCHC RBC-ENTMCNC: 28.6 PG — SIGNIFICANT CHANGE UP (ref 27–34)
MCHC RBC-ENTMCNC: 32.7 GM/DL — SIGNIFICANT CHANGE UP (ref 32–36)
MCV RBC AUTO: 87.4 FL — SIGNIFICANT CHANGE UP (ref 80–100)
NITRITE UR-MCNC: NEGATIVE — SIGNIFICANT CHANGE UP
NON HDL CHOLESTEROL: 86 MG/DL — SIGNIFICANT CHANGE UP
NRBC # BLD: 0 /100 WBCS — SIGNIFICANT CHANGE UP (ref 0–0)
NRBC # FLD: 0 K/UL — SIGNIFICANT CHANGE UP (ref 0–0)
PH UR: 6 — SIGNIFICANT CHANGE UP (ref 5–8)
PHOSPHATE SERPL-MCNC: 3.7 MG/DL — SIGNIFICANT CHANGE UP (ref 2.5–4.5)
PLATELET # BLD AUTO: 164 K/UL — SIGNIFICANT CHANGE UP (ref 150–400)
POTASSIUM SERPL-MCNC: 4.3 MMOL/L — SIGNIFICANT CHANGE UP (ref 3.5–5.3)
POTASSIUM SERPL-SCNC: 4.3 MMOL/L — SIGNIFICANT CHANGE UP (ref 3.5–5.3)
PROT UR-MCNC: 30 MG/DL
RAPID RVP RESULT: SIGNIFICANT CHANGE UP
RBC # BLD: 4.62 M/UL — SIGNIFICANT CHANGE UP (ref 3.8–5.2)
RBC # FLD: 13.4 % — SIGNIFICANT CHANGE UP (ref 10.3–14.5)
RBC CASTS # UR COMP ASSIST: 1 /HPF — SIGNIFICANT CHANGE UP (ref 0–4)
RSV RNA SPEC QL NAA+PROBE: SIGNIFICANT CHANGE UP
RV+EV RNA SPEC QL NAA+PROBE: SIGNIFICANT CHANGE UP
SARS-COV-2 RNA SPEC QL NAA+PROBE: SIGNIFICANT CHANGE UP
SODIUM SERPL-SCNC: 133 MMOL/L — LOW (ref 135–145)
SP GR SPEC: 1.04 — HIGH (ref 1–1.03)
SQUAMOUS # UR AUTO: 3 /HPF — SIGNIFICANT CHANGE UP (ref 0–5)
TRIGL SERPL-MCNC: 198 MG/DL — HIGH
TSH SERPL-MCNC: 2.32 UIU/ML — SIGNIFICANT CHANGE UP (ref 0.27–4.2)
UROBILINOGEN FLD QL: 0.2 MG/DL — SIGNIFICANT CHANGE UP (ref 0.2–1)
WBC # BLD: 9.24 K/UL — SIGNIFICANT CHANGE UP (ref 3.8–10.5)
WBC # FLD AUTO: 9.24 K/UL — SIGNIFICANT CHANGE UP (ref 3.8–10.5)
WBC UR QL: 2 /HPF — SIGNIFICANT CHANGE UP (ref 0–5)

## 2023-10-05 PROCEDURE — 99223 1ST HOSP IP/OBS HIGH 75: CPT | Mod: GC

## 2023-10-05 PROCEDURE — 99223 1ST HOSP IP/OBS HIGH 75: CPT

## 2023-10-05 PROCEDURE — 99223 1ST HOSP IP/OBS HIGH 75: CPT | Mod: FS

## 2023-10-05 PROCEDURE — 74018 RADEX ABDOMEN 1 VIEW: CPT | Mod: 26

## 2023-10-05 PROCEDURE — 99222 1ST HOSP IP/OBS MODERATE 55: CPT

## 2023-10-05 RX ORDER — SODIUM CHLORIDE 9 MG/ML
1000 INJECTION INTRAMUSCULAR; INTRAVENOUS; SUBCUTANEOUS
Refills: 0 | Status: DISCONTINUED | OUTPATIENT
Start: 2023-10-05 | End: 2023-10-05

## 2023-10-05 RX ORDER — VANCOMYCIN HCL 1 G
1000 VIAL (EA) INTRAVENOUS ONCE
Refills: 0 | Status: COMPLETED | OUTPATIENT
Start: 2023-10-05 | End: 2023-10-05

## 2023-10-05 RX ORDER — METFORMIN HYDROCHLORIDE 850 MG/1
1 TABLET ORAL
Qty: 0 | Refills: 0 | DISCHARGE

## 2023-10-05 RX ORDER — INSULIN LISPRO 100/ML
VIAL (ML) SUBCUTANEOUS EVERY 6 HOURS
Refills: 0 | Status: DISCONTINUED | OUTPATIENT
Start: 2023-10-05 | End: 2023-10-05

## 2023-10-05 RX ORDER — HYDROXYZINE HCL 10 MG
1 TABLET ORAL
Refills: 0 | DISCHARGE

## 2023-10-05 RX ORDER — DEXTROSE 50 % IN WATER 50 %
25 SYRINGE (ML) INTRAVENOUS ONCE
Refills: 0 | Status: DISCONTINUED | OUTPATIENT
Start: 2023-10-05 | End: 2023-10-08

## 2023-10-05 RX ORDER — LANOLIN ALCOHOL/MO/W.PET/CERES
3 CREAM (GRAM) TOPICAL AT BEDTIME
Refills: 0 | Status: DISCONTINUED | OUTPATIENT
Start: 2023-10-05 | End: 2023-10-08

## 2023-10-05 RX ORDER — SODIUM CHLORIDE 9 MG/ML
1000 INJECTION INTRAMUSCULAR; INTRAVENOUS; SUBCUTANEOUS ONCE
Refills: 0 | Status: COMPLETED | OUTPATIENT
Start: 2023-10-05 | End: 2023-10-05

## 2023-10-05 RX ORDER — ASPIRIN/CALCIUM CARB/MAGNESIUM 324 MG
1 TABLET ORAL
Qty: 0 | Refills: 0 | DISCHARGE

## 2023-10-05 RX ORDER — TOLTERODINE TARTRATE 1 MG/1
1 TABLET, FILM COATED ORAL
Refills: 0 | DISCHARGE

## 2023-10-05 RX ORDER — ONDANSETRON 8 MG/1
4 TABLET, FILM COATED ORAL ONCE
Refills: 0 | Status: COMPLETED | OUTPATIENT
Start: 2023-10-05 | End: 2023-10-05

## 2023-10-05 RX ORDER — GABAPENTIN 400 MG/1
1 CAPSULE ORAL
Qty: 0 | Refills: 0 | DISCHARGE

## 2023-10-05 RX ORDER — INSULIN GLARGINE 100 [IU]/ML
25 INJECTION, SOLUTION SUBCUTANEOUS AT BEDTIME
Refills: 0 | Status: DISCONTINUED | OUTPATIENT
Start: 2023-10-05 | End: 2023-10-06

## 2023-10-05 RX ORDER — INSULIN LISPRO 100/ML
8 VIAL (ML) SUBCUTANEOUS
Refills: 0 | Status: DISCONTINUED | OUTPATIENT
Start: 2023-10-05 | End: 2023-10-06

## 2023-10-05 RX ORDER — INSULIN LISPRO 100/ML
VIAL (ML) SUBCUTANEOUS
Refills: 0 | Status: DISCONTINUED | OUTPATIENT
Start: 2023-10-05 | End: 2023-10-05

## 2023-10-05 RX ORDER — DEXLANSOPRAZOLE 30 MG/1
1 CAPSULE, DELAYED RELEASE ORAL
Refills: 0 | DISCHARGE

## 2023-10-05 RX ORDER — SODIUM CHLORIDE 9 MG/ML
1000 INJECTION, SOLUTION INTRAVENOUS
Refills: 0 | Status: DISCONTINUED | OUTPATIENT
Start: 2023-10-05 | End: 2023-10-08

## 2023-10-05 RX ORDER — GLUCAGON INJECTION, SOLUTION 0.5 MG/.1ML
1 INJECTION, SOLUTION SUBCUTANEOUS ONCE
Refills: 0 | Status: DISCONTINUED | OUTPATIENT
Start: 2023-10-05 | End: 2023-10-08

## 2023-10-05 RX ORDER — LANOLIN ALCOHOL/MO/W.PET/CERES
1 CREAM (GRAM) TOPICAL
Qty: 0 | Refills: 0 | DISCHARGE

## 2023-10-05 RX ORDER — INSULIN GLARGINE 100 [IU]/ML
20 INJECTION, SOLUTION SUBCUTANEOUS ONCE
Refills: 0 | Status: COMPLETED | OUTPATIENT
Start: 2023-10-05 | End: 2023-10-05

## 2023-10-05 RX ORDER — DEXTROSE 50 % IN WATER 50 %
15 SYRINGE (ML) INTRAVENOUS ONCE
Refills: 0 | Status: DISCONTINUED | OUTPATIENT
Start: 2023-10-05 | End: 2023-10-08

## 2023-10-05 RX ORDER — PIPERACILLIN AND TAZOBACTAM 4; .5 G/20ML; G/20ML
3.38 INJECTION, POWDER, LYOPHILIZED, FOR SOLUTION INTRAVENOUS ONCE
Refills: 0 | Status: COMPLETED | OUTPATIENT
Start: 2023-10-05 | End: 2023-10-05

## 2023-10-05 RX ORDER — INSULIN LISPRO 100/ML
VIAL (ML) SUBCUTANEOUS
Refills: 0 | Status: DISCONTINUED | OUTPATIENT
Start: 2023-10-05 | End: 2023-10-08

## 2023-10-05 RX ORDER — MAGNESIUM SULFATE 500 MG/ML
2 VIAL (ML) INJECTION ONCE
Refills: 0 | Status: COMPLETED | OUTPATIENT
Start: 2023-10-05 | End: 2023-10-05

## 2023-10-05 RX ORDER — ACETAMINOPHEN 500 MG
650 TABLET ORAL EVERY 6 HOURS
Refills: 0 | Status: DISCONTINUED | OUTPATIENT
Start: 2023-10-05 | End: 2023-10-08

## 2023-10-05 RX ORDER — ONDANSETRON 8 MG/1
4 TABLET, FILM COATED ORAL EVERY 8 HOURS
Refills: 0 | Status: DISCONTINUED | OUTPATIENT
Start: 2023-10-05 | End: 2023-10-08

## 2023-10-05 RX ORDER — AMLODIPINE BESYLATE 2.5 MG/1
1 TABLET ORAL
Qty: 0 | Refills: 0 | DISCHARGE

## 2023-10-05 RX ORDER — DEXTROSE 50 % IN WATER 50 %
12.5 SYRINGE (ML) INTRAVENOUS ONCE
Refills: 0 | Status: DISCONTINUED | OUTPATIENT
Start: 2023-10-05 | End: 2023-10-08

## 2023-10-05 RX ORDER — SODIUM CHLORIDE 9 MG/ML
1000 INJECTION INTRAMUSCULAR; INTRAVENOUS; SUBCUTANEOUS
Refills: 0 | Status: DISCONTINUED | OUTPATIENT
Start: 2023-10-05 | End: 2023-10-07

## 2023-10-05 RX ORDER — PIPERACILLIN AND TAZOBACTAM 4; .5 G/20ML; G/20ML
3.38 INJECTION, POWDER, LYOPHILIZED, FOR SOLUTION INTRAVENOUS EVERY 8 HOURS
Refills: 0 | Status: DISCONTINUED | OUTPATIENT
Start: 2023-10-05 | End: 2023-10-07

## 2023-10-05 RX ORDER — MORPHINE SULFATE 50 MG/1
4 CAPSULE, EXTENDED RELEASE ORAL ONCE
Refills: 0 | Status: DISCONTINUED | OUTPATIENT
Start: 2023-10-05 | End: 2023-10-05

## 2023-10-05 RX ORDER — INSULIN LISPRO 100/ML
VIAL (ML) SUBCUTANEOUS AT BEDTIME
Refills: 0 | Status: DISCONTINUED | OUTPATIENT
Start: 2023-10-05 | End: 2023-10-08

## 2023-10-05 RX ADMIN — Medication 8: at 13:06

## 2023-10-05 RX ADMIN — INSULIN GLARGINE 20 UNIT(S): 100 INJECTION, SOLUTION SUBCUTANEOUS at 03:45

## 2023-10-05 RX ADMIN — ONDANSETRON 4 MILLIGRAM(S): 8 TABLET, FILM COATED ORAL at 07:20

## 2023-10-05 RX ADMIN — Medication 25 GRAM(S): at 07:33

## 2023-10-05 RX ADMIN — INSULIN GLARGINE 25 UNIT(S): 100 INJECTION, SOLUTION SUBCUTANEOUS at 22:05

## 2023-10-05 RX ADMIN — SODIUM CHLORIDE 1000 MILLILITER(S): 9 INJECTION INTRAMUSCULAR; INTRAVENOUS; SUBCUTANEOUS at 01:21

## 2023-10-05 RX ADMIN — Medication 8 UNIT(S): at 17:34

## 2023-10-05 RX ADMIN — PIPERACILLIN AND TAZOBACTAM 25 GRAM(S): 4; .5 INJECTION, POWDER, LYOPHILIZED, FOR SOLUTION INTRAVENOUS at 22:51

## 2023-10-05 RX ADMIN — Medication 3 MILLIGRAM(S): at 22:05

## 2023-10-05 RX ADMIN — PIPERACILLIN AND TAZOBACTAM 200 GRAM(S): 4; .5 INJECTION, POWDER, LYOPHILIZED, FOR SOLUTION INTRAVENOUS at 01:42

## 2023-10-05 RX ADMIN — MORPHINE SULFATE 4 MILLIGRAM(S): 50 CAPSULE, EXTENDED RELEASE ORAL at 02:52

## 2023-10-05 RX ADMIN — Medication 650 MILLIGRAM(S): at 22:05

## 2023-10-05 RX ADMIN — MORPHINE SULFATE 4 MILLIGRAM(S): 50 CAPSULE, EXTENDED RELEASE ORAL at 01:20

## 2023-10-05 RX ADMIN — SODIUM CHLORIDE 75 MILLILITER(S): 9 INJECTION INTRAMUSCULAR; INTRAVENOUS; SUBCUTANEOUS at 09:00

## 2023-10-05 RX ADMIN — Medication 12: at 04:47

## 2023-10-05 RX ADMIN — Medication 2: at 17:35

## 2023-10-05 RX ADMIN — ONDANSETRON 4 MILLIGRAM(S): 8 TABLET, FILM COATED ORAL at 02:47

## 2023-10-05 RX ADMIN — Medication 250 MILLIGRAM(S): at 03:45

## 2023-10-05 NOTE — CONSULT NOTE ADULT - ATTENDING COMMENTS
Per neurology resident - daughter mentioned possible intellectual disability in the patient - unclear baseline function.   Exam:  Arousable to voice, slow to answer, intermittently inattentive.  Oriented to self, [facility].  Days of the week - Sun through Friday - not Saturday.  Unable to do them backwards.      A/P  Ms. Carter is a 72 yo woman with toxic metabolic septic encephalopathy  Neurology signing off. Please reconsult PRN or call [facility] with any questions.  Thank you
73F uncontrolled DM2 HbA1c 15.7% with severe hyperglycemia 600s on admission and initial AMS consistent with HHS now improving with insulin therapy. Agree with basal bolus insulin. Will follow to determine safe and optimal dc plan. Will consider adding cardioprotective meds, and given severely high A1c basal bolus would be needed.  Endocrine team consulted for uncontrolled diabetes. Patient is high risk with high level decision making due to uncontrolled diabetes which places patient at high risk for cardiovascular and cerebrovascular events. Patient with lability of glucose requiring close monitoring and insulin adjustments.    Carl King MD  Division of Endocrinology  Pager: [phone]    If after 6PM or before 9AM, or on weekends/holidays, please call endocrine answering service for assistance ([phone]).  For nonurgent matters email [email or web address] for assistance.

## 2023-10-05 NOTE — H&P ADULT - PROBLEM SELECTOR PLAN 3
Elevated troponin  - EKG non ischemic   - CPK 49 and trop 106—100  - Cards appreciated suspect demand ischemia   - TTE

## 2023-10-05 NOTE — CONSULT NOTE ADULT - SUBJECTIVE AND OBJECTIVE BOX
HPI: 73-year-old female history of CAD with stents on aspirin, diabetes on insulin, hypertension, high cholesterol, CKD brought in by EMS from home with daughter at bedside for altered mental status.  Daughter states she left for work at 9am and that was LKW.  When she got home this afternoon she was still in bed and confused.  Patient altered, requiring assistance walking to the bathroom.  Daughter called EMS.  Code stroke was called in triage neurology at bedside.  Patient intermittently answering questions following some commands but no focal deficits.  No sign of head trauma.  Daughter states that she was recently found to have a UTI and was hyperglycemic and in the past she has been confused.  Found to have R temp 101.9.  Pt is now A&Ox4.  Has not followed up with cardiologist in many years.  Pt denies LOC, syncope, chills, chest pain, shortness of breath, numbness, tingling, headache, visual/hearing changes, dizziness, lightheadedness.    Allergies  No Known Allergies    Intolerances      PAST MEDICAL & SURGICAL HISTORY:  Hypertension  Diabetes  Hyperlipemia  CAD S/P percutaneous coronary angioplasty  Osteoarthritis  Urinary incontinence in female  H/O:   S/P cholecystectomy  S/P appendectomy  S/P rotator cuff surgery  History of hysteroscopy      FAMILY HISTORY:  No pertinent family history in first degree relatives      SUBSTANCE USE  Tobacco Usage:  denies  Alcohol Usage: denies  Recreational drugs: denies      REVIEW OF SYSTEMS:  CV: chest pain (-), palpitation (-), orthopnea (-), PND (-), edema (-)  PULM: SOB (-), cough (-), wheezing (-), hemoptysis (-).   CONST: fever (-), chills (-) or fatigue (-)  GI: abdominal distension (-), abdominal pain (-) , nausea/vomiting (-), hematemesis, (-), melena (-), hematochezia (-)  : dysuria (-), frequency (-), hematuria (-).   NEURO: numbness (-), weakness (-), dizziness (-)  SKIN: itching (-), rash (-)  HEENT:  visual changes (-); vertigo or throat pain (-);  neck stiffness (-)   All other review of systems is negative unless indicated above.      T(C): 36.5 (10-05-23 @ 01:07), Max: 38.8 (10-04-23 @ 20:23)  HR: 80 (10-05-23 @ 01:34) (80 - 101)  BP: 112/56 (10-05-23 @ 01:34) (109/59 - 136/78)  RR: 16 (10-05-23 @ 01:34) (16 - 22)  SpO2: 94% (10-05-23 @ 01:34) (94% - 100%)  Wt(kg): --  I&O's Summary      Physical Exam:  General: NAD  Cardiovascular: Normal S1 S2, No JVD, No murmurs, No edema  Respiratory: Lungs clear to auscultation	  Gastrointestinal:  Soft, Non-tender, + BS	  Skin: warm and dry, No rashes, No ecchymoses, No cyanosis	  Extremities:  No clubbing, cyanosis or edema  Vascular: Peripheral pulses palpable 2+ bilaterally    CBC Full  -  ( 04 Oct 2023 19:20 )  WBC Count : 13.42 K/uL  Hemoglobin : 13.6 g/dL  Hematocrit : 39.6 %  Platelet Count - Automated : 183 K/uL  Mean Cell Volume : 84.8 fL  Mean Cell Hemoglobin : 29.1 pg  Mean Cell Hemoglobin Concentration : 34.3 gm/dL  Auto Neutrophil # : 12.30 K/uL  Auto Lymphocyte # : 0.40 K/uL  Auto Monocyte # : 0.60 K/uL  Auto Eosinophil # : 0.01 K/uL  Auto Basophil # : 0.06 K/uL  Auto Neutrophil % : 91.6 %  Auto Lymphocyte % : 3.0 %  Auto Monocyte % : 4.5 %  Auto Eosinophil % : 0.1 %  Auto Basophil % : 0.4 %    10-04    134<L>  |  97<L>  |  17  ----------------------------<  436<H>  4.0   |  25  |  0.93    Ca    9.2      04 Oct 2023 19:20    TPro  6.7  /  Alb  3.6  /  TBili  0.5  /  DBili  x   /  AST  21  /  ALT  18  /  AlkPhos  77  10-04    proBNP:   Lipid Profile:   HgA1c:   TSH:   CARDIAC MARKERS ( 04 Oct 2023 21:16 )  100 ng/L / x     / x     / 49 U/L / x     / 1.8 ng/mL  CARDIAC MARKERS ( 04 Oct 2023 19:20 )  106 ng/L / x     / x     / x     / x     / x              Diagnostic testing:  cath: --  echo: --

## 2023-10-05 NOTE — H&P ADULT - PROBLEM SELECTOR PLAN 2
A1C- 15.7; FS on admission 600; concern for hyperosmolar state   Home regimen metformin 1g BID; januvia 100mg qd: basglar 25 U sq qhs    -  VBG 7.42/45/26/39.3; mildly elevated B-OH 0.5  - normal AG gap   - s/p 2L NS in ED   - NS 75 ml/hr  - check serum Osm; repeat BoH   - Endo c/s   - c/w lantus 18 U A1C- 15.7; FS on admission 600; concern for hyperosmolar state   Home regimen metformin 1g BID; Januvia 100mg qd: basglar 25 U sq qhs    -  VBG 7.42/45/26/39.3; mildly elevated B-OH 0.5  - normal AG gap   - s/p 2L NS in ED   - NS 75 ml/hr  - check serum Osm; repeat BoH   - Endo c/s   - c/w lantus 18 U

## 2023-10-05 NOTE — PATIENT PROFILE ADULT - FALL HARM RISK - HARM RISK INTERVENTIONS
Assistance with ambulation/Assistance OOB with selected safe patient handling equipment/Communicate Risk of Fall with Harm to all staff/Discuss with provider need for PT consult/Monitor gait and stability/Orthostatic vital signs/Provide patient with walking aids - walker, cane, crutches/Reinforce activity limits and safety measures with patient and family/Tailored Fall Risk Interventions/Visual Cue: Yellow wristband and red socks/Bed in lowest position, wheels locked, appropriate side rails in place/Call bell, personal items and telephone in reach/Instruct patient to call for assistance before getting out of bed or chair/Non-slip footwear when patient is out of bed/[place] to call system/Physically safe environment - no spills, clutter or unnecessary equipment/Purposeful Proactive Rounding/Room/bathroom lighting operational, light cord in reach

## 2023-10-05 NOTE — H&P ADULT - PROBLEM SELECTOR PLAN 4
- home med norvasc 10 mg qd / unclear if on HCTZ 12.5 mg qd   - BP meds on hold in setting of sepsis

## 2023-10-05 NOTE — CONSULT NOTE ADULT - ASSESSMENT
This is a 74 y/o F with PMHx of CAD s/p PCI on aspirin, poorly-controlled T2DM on insulin, HTN and HLD who presented to the ED for acute metabolic encephalopathy secondary to UTI vs hyperglycemia. Endocrinology consulted for uncontrolled T2DM w/ A1c 15.7%.     #Poorly controlled T2DM  A1c 15.7%, increased from 14% in 6/23.   - please obtain pt weight  - start lantus  + admelog  - discharge planning: Given elevated A1c 15.7%, patient likely needs to go home on basal/bolus, dosing TBD.           NOTE INCOMPLETE/ IN PROGRESS  *Please wait for attending attestation for official recommendations.  This is a 72 y/o F with PMHx of CAD s/p PCI on aspirin, poorly-controlled T2DM on insulin, HTN and HLD who presented to the ED for acute metabolic encephalopathy secondary to UTI vs hyperglycemia. Endocrinology consulted for uncontrolled T2DM w/ A1c 15.7%.     #HHS  Presented with altered mental status, glucose >600, no acidosis, minimal ketonuria, BHB 0.5. Received 20 units of lantus, 12 units lispro and 2L NS IVF in the ED.   - per daughter, mental status appears improved and near baseline    #Poorly controlled T2DM   A1c 15.7%, increased from 14% in 6/23. Home regimen: lantus 25 units at bedtime, januvia 100mg daily and metformin 1000mg BID.   - start lantus 25 units at bedtime + admelog 8 units premeals   - recommend moderate correction scale before meals and at bedtime  - patient passed dysphagia screen, start consistent carbs diet   - discussed target a1c <7%  - discussed risk for micro/macrovascular complications with prolonged elevated a1c and prolonged hyperglycemia   - Discharge plan: Given elevated A1c 15.7%, patient likely needs to go home on basal/bolus insulin, dosing TBD. Will need to discuss with daughter if there will be any barriers/difficulties with administering pre-meal insulin. Patient may need extra assistance either through daughter of home health aid. Patient would also likely benefit from GLP1 for weight loss or possibly a SGLT2 inhibitor given cardiac history (will follow-up on TTE). Patient unlikely to benefit from continued metformin, thus can discontinue. She would also benefit from a CGM.   - can check c-peptide when blood sugars further improve   - will need close follow up with endocrinology outpatient     #HTN   BP between 109//83.   - can consider restarting home BP medications     #HLD  Total cholesterol 127, Triglycerides 198, HDL 41, LDL 46   - consider restarting high intensity atorvastatin equivalent to home pravastatin 40mg daily     NOTE INCOMPLETE/ IN PROGRESS  *Please wait for attending attestation for official recommendations.  This is a 74 y/o F with PMHx of CAD s/p PCI on aspirin, poorly-controlled T2DM on insulin, HTN and HLD who presented to the ED for acute metabolic encephalopathy secondary to UTI vs hyperglycemia. Endocrinology consulted for uncontrolled T2DM w/ A1c 15.7%.     #HHS  Presented with altered mental status, glucose >600, no acidosis, minimal ketonuria, BHB 0.5. Received 20 units of lantus, 12 units lispro and 2L NS IVF in the ED.   - per daughter, mental status appears improved and near baseline    #Poorly controlled T2DM   A1c 15.7%, increased from 14% in 6/23. Home regimen: lantus 25 units at bedtime, januvia 100mg daily and metformin 1000mg BID.   - start lantus 25 units at bedtime + admelog 8 units premeals   - recommend moderate correction scale before meals and at bedtime  - patient passed dysphagia screen, start consistent carbs diet   - discussed target a1c <7%  - discussed risk for micro/macrovascular complications with prolonged elevated a1c and prolonged hyperglycemia   - Discharge plan: Given elevated A1c 15.7%, patient likely needs to go home on basal/bolus insulin, dosing TBD. Will need to discuss with daughter if there will be any barriers/difficulties with administering pre-meal insulin. Patient may need extra assistance either through daughter of home health aid. Patient would also likely benefit from GLP1 for weight loss or possibly a SGLT2 inhibitor given cardiac history (will follow-up on TTE). Patient unlikely to benefit from continued metformin, thus can discontinue. She would also benefit from a CGM.   - can check c-peptide when blood sugars further improve   - will need close follow up with endocrinology outpatient     #HTN   BP between 109//83.   - can consider restarting home BP medications     #HLD  Total cholesterol 127, Triglycerides 198, HDL 41, LDL 46   - consider restarting high intensity atorvastatin equivalent to home pravastatin 40mg daily

## 2023-10-05 NOTE — CONSULT NOTE ADULT - ASSESSMENT
73-year-old female history of CAD with stents on aspirin, diabetes on insulin, hypertension, high cholesterol, CKD brought in by EMS from home with daughter at bedside for altered mental status.  Daughter states she left for work at 9am and that was LKW.  When she got home this afternoon she was still in bed and confused.  Patient altered, requiring assistance walking to the bathroom.  Daughter called EMS.  Code stroke was called in triage neurology at bedside.  Patient intermittently answering questions following some commands but no focal deficits.  No sign of head trauma.  Daughter states that she was recently found to have a UTI and was hyperglycemic and in the past she has been confused.  Found to have R temp 101.9.  Pt is now A&Ox4.  Has not followed up with cardiologist in many years.  Cardiology consulted for elevated troponin.     # Elevated Troponin  - Patient with elevated -->100, CK49, CKMB 1.8, CPK 3.7  - HD stable, not in decompensated HF state  - Impression- demand ischemia in the setting of infection   - EKG show showed SR with PAC without st deviations  - Serial EKG PRN to assess for ST changes  - Continuous cardiac monitoring to monitor for arrhythmias  - CBC, CMP, coags, HbA1C, TSH, lipids for comorbidities, Trend cardiac enzymes  - ECHO: TTE in am  - EKG and CE not consistent with ACS, would not treat for acs          Thank you, if any questions or clinical situation changes please call [facility] #[phone].  Attending Attestation to follow

## 2023-10-05 NOTE — H&P ADULT - NSHPLABSRESULTS_GEN_ALL_CORE
13.2   9.24  )-----------( 164      ( 05 Oct 2023 04:45 )             40.4       10-05    133<L>  |  96<L>  |  17  ----------------------------<  478<HH>  4.3   |  23  |  0.87    Ca    8.6      05 Oct 2023 04:45  Phos  3.7     10-05  Mg     1.60     10-05    TPro  6.7  /  Alb  3.6  /  TBili  0.5  /  DBili  x   /  AST  21  /  ALT  18  /  AlkPhos  77  10-04      CAPILLARY BLOOD GLUCOSE  600 (04 Oct 2023 19:39)      POCT Blood Glucose.: 348 mg/dL (05 Oct 2023 12:42)  POCT Blood Glucose.: 408 mg/dL (05 Oct 2023 05:26)  POCT Blood Glucose.: 418 mg/dL (05 Oct 2023 04:44)  POCT Blood Glucose.: 530 mg/dL (05 Oct 2023 04:34)  POCT Blood Glucose.: 423 mg/dL (05 Oct 2023 03:43)  POCT Blood Glucose.: 418 mg/dL (05 Oct 2023 00:47)  POCT Blood Glucose.: 540 mg/dL (04 Oct 2023 18:25)  POCT Blood Glucose.: >600 mg/dL (04 Oct 2023 18:23)      Urinalysis Basic - ( 05 Oct 2023 04:45 )    Color: x / Appearance: x / SG: x / pH: x  Gluc: 478 mg/dL / Ketone: x  / Bili: x / Urobili: x   Blood: x / Protein: x / Nitrite: x   Leuk Esterase: x / RBC: x / WBC x   Sq Epi: x / Non Sq Epi: x / Bacteria: x        PT/INR - ( 04 Oct 2023 19:20 )   PT: 10.7 sec;   INR: 0.95 ratio         PTT - ( 04 Oct 2023 19:20 )  PTT:26.8 sec    Radiology    < from: Xray Abdomen 1 View PORTABLE -Urgent (Xray Abdomen 1 View PORTABLE -Urgent .) (10.05.23 @ 11:00) >    IMPRESSION:  Nonobstructive bowel gas pattern.    < end of copied text >    < from: Xray Chest 1 View- PORTABLE-Urgent (Xray Chest 1 View- PORTABLE-Urgent .) (10.04.23 @ 21:56) >    IMPRESSION:  Clear lungs.    < end of copied text >

## 2023-10-05 NOTE — H&P ADULT - HISTORY OF PRESENT ILLNESS
72 yo F w/ Hx DM on insulin as per notes administers own insulin, currently being treated for UTI p/w confusion/AMS. As per daughter pt was in usual state of health on 10/4 AM when daughter went to work and when she returned she was not answering questions noted to be unsteady. Daughter called EMS and fingerstick noted to be elevated. On arrival concern for stroke canceled given elevated FS in 600's     Given 2L NS labs showed no AG; elevated WBC febrile to 101.9 started on empiric abx (vanco/zosyn). As per ED noted to be more awake. cards consulted for elevated trop EKG non ischemic and PAC.  ISS 12 U 4 AM given and lantus 20 U @ 3 AM; 8 U ademalog @ 1 PM.     Daughter at bedside states she is improved almost back to baseline. Rx nitrofurantoin 9/29   74 yo F w/ Hx DM on insulin as per notes administers own insulin, currently being treated for UTI p/w confusion/AMS. As per daughter pt was in usual state of health on 10/4 AM when daughter went to work and when she returned she was not answering questions noted to be unsteady. Daughter called EMS and fingerstick noted to be elevated. On arrival concern for stroke canceled given elevated FS in 600's     Given 2L NS labs showed no AG; elevated WBC febrile to 101.9 started on empiric abx (vanco/zosyn). As per ED noted to be more awake. cards consulted for elevated trop EKG non ischemic and PAC.  ISS 12 U 4 AM given and lantus 20 U @ 3 AM; 8 U ademalog @ 1 PM. + recieved morphine 4 mg x 2 with subsequent N/V. AXR neg     Daughter at bedside states she is improved almost back to baseline. Rx nitrofurantoin 9/29

## 2023-10-05 NOTE — PATIENT PROFILE ADULT - NSPROPTRIGHTSUPPORTPHONE_GEN_A_NUR
PT CANNOT BE AROUSED TO TALK AT THIS TIME. WILL DO ANOTHER PSYCHIATRIC 
COUNSELING LATER. pt unsure at this time

## 2023-10-05 NOTE — CONSULT NOTE ADULT - NS ATTEND AMEND GEN_ALL_CORE FT
mildly elevated troponin in the setting of febrile illness and hyperglycemia and in the absence of clear anginal symptoms  not ACS  will sign off

## 2023-10-05 NOTE — CONSULT NOTE ADULT - SUBJECTIVE AND OBJECTIVE BOX
NOTE INCOMPLETE/ IN PROGRESS  *Please wait for attending attestation for official recommendations.     HPI:      PAST MEDICAL & SURGICAL HISTORY:  Hypertension      Diabetes      Hyperlipemia      CAD S/P percutaneous coronary angioplasty      Osteoarthritis      Urinary incontinence in female      H/O:       S/P cholecystectomy      S/P appendectomy      S/P rotator cuff surgery      History of hysteroscopy          FAMILY HISTORY:  No pertinent family history in first degree relatives        Social History:    Outpatient Medications:    MEDICATIONS  (STANDING):  dextrose 5%. 1000 milliLiter(s) (50 mL/Hr) IV Continuous <Continuous>  dextrose 5%. 1000 milliLiter(s) (100 mL/Hr) IV Continuous <Continuous>  dextrose 50% Injectable 12.5 Gram(s) IV Push once  dextrose 50% Injectable 25 Gram(s) IV Push once  dextrose 50% Injectable 25 Gram(s) IV Push once  glucagon  Injectable 1 milliGRAM(s) IntraMuscular once  insulin lispro (ADMELOG) corrective regimen sliding scale   SubCutaneous every 6 hours  sodium chloride 0.9%. 1000 milliLiter(s) (75 mL/Hr) IV Continuous <Continuous>    MEDICATIONS  (PRN):  dextrose Oral Gel 15 Gram(s) Oral once PRN Blood Glucose LESS THAN 70 milliGRAM(s)/deciliter      Allergies    sulfa drugs (Unknown)    Intolerances      Review of Systems:  Constitutional: No fever  Eyes: No blurry vision  Neuro: No tremors  HEENT: No pain  Cardiovascular: No chest pain, palpitations  Respiratory: No SOB, no cough  GI: No nausea, vomiting, abdominal pain  : No dysuria  Skin: no rash  Psych: no depression  Endocrine: no polyuria, polydipsia  Hem/lymph: no swelling  Osteoporosis: no fractures    ALL OTHER SYSTEMS REVIEWED AND NEGATIVE    UNABLE TO OBTAIN    PHYSICAL EXAM:  VITALS: T(C): 36.8 (10-05-23 @ 09:21)  T(F): 98.2 (10-05-23 @ 09:21), Max: 101.9 (10-04-23 @ 20:23)  HR: 75 (10-05-23 @ 09:21) (74 - 101)  BP: 140/81 (10-05-23 @ 09:21) (109/59 - 147/83)  RR:  (15 - 22)  SpO2:  (89% - 100%)  Wt(kg): --  GENERAL: NAD, well-groomed, well-developed  EYES: No proptosis, no lid lag, anicteric  HEENT:  Atraumatic, Normocephalic, moist mucous membranes  THYROID: Normal size, no palpable nodules  RESPIRATORY: Clear to auscultation bilaterally; No rales, rhonchi, wheezing  CARDIOVASCULAR: Regular rate and rhythm; No murmurs; no peripheral edema  GI: Soft, nontender, non distended, normal bowel sounds  SKIN: Dry, intact, No rashes or lesions  MUSCULOSKELETAL: Full range of motion, normal strength  NEURO: sensation intact, extraocular movements intact, no tremor  PSYCH: Alert and oriented x 3, normal affect, normal mood  CUSHING'S SIGNS: no striae      CAPILLARY BLOOD GLUCOSE  600 (04 Oct 2023 19:39)      POCT Blood Glucose.: 408 mg/dL (05 Oct 2023 05:26)  POCT Blood Glucose.: 418 mg/dL (05 Oct 2023 04:44)  POCT Blood Glucose.: 530 mg/dL (05 Oct 2023 04:34)  POCT Blood Glucose.: 423 mg/dL (05 Oct 2023 03:43)  POCT Blood Glucose.: 418 mg/dL (05 Oct 2023 00:47)  POCT Blood Glucose.: 540 mg/dL (04 Oct 2023 18:25)  POCT Blood Glucose.: >600 mg/dL (04 Oct 2023 18:23)                            13.2   9.24  )-----------( 164      ( 05 Oct 2023 04:45 )             40.4       10-05    133<L>  |  96<L>  |  17  ----------------------------<  478<HH>  4.3   |  23  |  0.87    eGFR: 70    Ca    8.6      10-05  Mg     1.60     10-05  Phos  3.7     10-05    TPro  6.7  /  Alb  3.6  /  TBili  0.5  /  DBili  x   /  AST  21  /  ALT  18  /  AlkPhos  77  10-04      Thyroid Function Tests:  10-05 @ 04:45 TSH 2.32 FreeT4 -- T3 -- Anti TPO -- Anti Thyroglobulin Ab -- TSI --      A1C with Estimated Average Glucose Result: 15.7 % (10-04-23 @ 19:20)  A1C with Estimated Average Glucose Result: 14.0 % (23 @ 07:26)  A1C with Estimated Average Glucose Result: 13.7 % (23 @ 05:47)      10- Chol 127 Direct LDL -- LDL calculated 46 HDL 41<L> Trig 198<H>    Radiology:              NOTE INCOMPLETE/ IN PROGRESS  *Please wait for attending attestation for official recommendations.     HPI:  This is a 72 y/o F with PMHx of CAD s/p stents on aspirin, poorly-controlled T2DM on insulin, HTN and HLD who presented to the ED for altered mental status.       Per daughter at bedside,        PAST MEDICAL & SURGICAL HISTORY:  Hypertension  Diabetes  Hyperlipemia  CAD S/P percutaneous coronary angioplasty  Osteoarthritis  Urinary incontinence in female    H/O:   S/P cholecystectomy  S/P appendectomy  S/P rotator cuff surgery  History of hysteroscopy    FAMILY HISTORY:  T2DM- mother   Denied fam hx of thyroid disease.     Social History:  Lives at home with daughter  Ambulates w/ walker  Denied alcohol use  Former smoker, 10 pack years  (1/2 ppd for ~20 years, quit 40 years ago)   Denied illicit drug use     Outpatient Medications:  Home Medications:  amLODIPine 10 mg oral tablet: 1 tab(s) orally once a day (2023 14:28)  aspirin 81 mg oral tablet: 1 tab(s) orally once a day (2023 14:28)  Basaglar KwikPen 100 units/mL subcutaneous solution: 25 unit(s) subcutaneous once a day (at bedtime) (2023 13:23)  dexlansoprazole 60 mg oral delayed release capsule: 1 orally once a day (2023 14:18)  DULoxetine 30 mg oral delayed release capsule: 1 cap(s) orally once a day (2023 14:28)  gabapentin 600 mg oral tablet: 1 tab(s) orally 2 times a day (2023 14:28)  hydroCHLOROthiazide 12.5 mg oral tablet: 1 tab(s) orally once a day (2023 14:28)  hydrOXYzine hydrochloride 10 mg oral tablet: 1 orally once a day (at bedtime) (2023 14:24)  Januvia 100 mg oral tablet: 1 orally once a day (2023 14:22)  lysine 500 mg oral tablet: 1 tab(s) orally once a day (2023 14:28)  metFORMIN 1000 mg oral tablet: 1 tab(s) orally 2 times a day (2023 14:28)  pravastatin 40 mg oral tablet: 1 tab(s) orally once a day (2023 14:28)  tolterodine 4 mg oral capsule, extended release: 1 orally once a day (2023 14:22)    MEDICATIONS  (STANDING):  dextrose 5%. 1000 milliLiter(s) (50 mL/Hr) IV Continuous <Continuous>  dextrose 5%. 1000 milliLiter(s) (100 mL/Hr) IV Continuous <Continuous>  dextrose 50% Injectable 12.5 Gram(s) IV Push once  dextrose 50% Injectable 25 Gram(s) IV Push once  dextrose 50% Injectable 25 Gram(s) IV Push once  glucagon  Injectable 1 milliGRAM(s) IntraMuscular once  insulin lispro (ADMELOG) corrective regimen sliding scale   SubCutaneous every 6 hours  sodium chloride 0.9%. 1000 milliLiter(s) (75 mL/Hr) IV Continuous <Continuous>    MEDICATIONS  (PRN):  dextrose Oral Gel 15 Gram(s) Oral once PRN Blood Glucose LESS THAN 70 milliGRAM(s)/deciliter    Allergies  sulfa drugs (Unknown)    Review of Systems:  Constitutional: No fever  Eyes: No blurry vision  Neuro: No tremors  HEENT: No pain  Cardiovascular: No chest pain, palpitations  Respiratory: No SOB, no cough  GI: No nausea, vomiting, abdominal pain  : No dysuria  Skin: no rash  Psych: no depression  Endocrine: +polyuria, +polydipsia   Hem/lymph: no swelling  Osteoporosis: no fractures    PHYSICAL EXAM:  VITALS: T(C): 36.8 (10-05-23 @ 09:21)  T(F): 98.2 (10-05-23 @ 09:21), Max: 101.9 (10-04-23 @ 20:23)  HR: 75 (10-05-23 @ 09:21) (74 - 101)  BP: 140/81 (10-05-23 @ 09:21) (109/59 - 147/83)  RR:  (15 - 22)  SpO2:  (89% - 100%)  Wt(kg): --  GENERAL: NAD, well-groomed, resting in bed comfortably   EYES: No proptosis, no lid lag, anicteric  HEENT:  Atraumatic, Normocephalic, moist mucous membranes  THYROID: Normal size, no palpable nodules  RESPIRATORY: Clear to auscultation bilaterally; No rales, rhonchi, wheezing  CARDIOVASCULAR: Regular rate and rhythm; No murmurs; no peripheral edema  GI: Soft, nontender, non distended, normal bowel sounds  SKIN: Dry, intact, No rashes or lesions  MUSCULOSKELETAL: Full range of motion, normal strength  NEURO: sensation intact, extraocular movements intact, no tremor  PSYCH: Alert and oriented x 2, normal affect, normal mood  CUSHING'S SIGNS: no striae    CAPILLARY BLOOD GLUCOSE  600 (04 Oct 2023 19:39)    POCT Blood Glucose.: 408 mg/dL (05 Oct 2023 05:26)  POCT Blood Glucose.: 418 mg/dL (05 Oct 2023 04:44)  POCT Blood Glucose.: 530 mg/dL (05 Oct 2023 04:34)  POCT Blood Glucose.: 423 mg/dL (05 Oct 2023 03:43)  POCT Blood Glucose.: 418 mg/dL (05 Oct 2023 00:47)  POCT Blood Glucose.: 540 mg/dL (04 Oct 2023 18:25)  POCT Blood Glucose.: >600 mg/dL (04 Oct 2023 18:23)               13.2   9.24  )-----------( 164      ( 05 Oct 2023 04:45 )             40.4     10-05    133<L>  |  96<L>  |  17  ----------------------------<  478<HH>  4.3   |  23  |  0.87    eGFR: 70    Ca    8.6      10-05  Mg     1.60     10-05  Phos  3.7     10-05    TPro  6.7  /  Alb  3.6  /  TBili  0.5  /  DBili  x   /  AST  21  /  ALT  18  /  AlkPhos  77  10-04    Radiology:  < from: CT Brain Stroke Protocol (10.04.23 @ 19:58) >    COMPARISON: CT head 2023    FINDINGS:    The ventricular and sulcal size and configuration is age appropriate.     There is no acute loss of gray-white differentiation. There are mild   patchy areas of hypodensity in the periventricular and subcortical white   matter which are likely related to chronic microangiopathic changes.    There is no evidence of mass effect, midline shift, acute intracranial   hemorrhage, or extra-axial collections.     The calvarium is intact. The paranasal sinuses are clear.The mastoid air   cells are predominantly clear. The orbits are unremarkable.      IMPRESSION:  No acute intracranial hemorrhage or acute territorial infarction.    < end of copied text >    Thyroid Function Tests:  10-05 @ 04:45 TSH 2.32 FreeT4 -- T3 -- Anti TPO -- Anti Thyroglobulin Ab -- TSI --      A1C with Estimated Average Glucose Result: 15.7 % (10-04-23 @ 19:20)  A1C with Estimated Average Glucose Result: 14.0 % (23 @ 07:26)  A1C with Estimated Average Glucose Result: 13.7 % (23 @ 05:47)      10-05 Chol 127 Direct LDL -- LDL calculated 46 HDL 41<L> Trig 198<H>    Radiology:              NOTE INCOMPLETE/ IN PROGRESS  *Please wait for attending attestation for official recommendations.     HPI:  This is a 74 y/o F with PMHx of CAD s/p PCI on aspirin, poorly-controlled T2DM on insulin, HTN and HLD who presented to the ED for altered mental status. Per daughter at bedside, she found patient in bed and confused after retuning from work yesterday afternoon. She checked the patient's blood sugar, found her to be very hyperglycemic and thus called EMS to bring her to the ED for further evaluation. Per daughter, patient becomes confused when blood sugars are elevated or when there is a urinary tract infection. She was started on a macrobid treatment about two days ago for possible urinary tract infection. Patient endorsed recent polydipsia, polyuria and nausea. Denied any fever, chills, poor appetite, chest pain, sob, ab pain or dysuria.     Patient was diagnosed w/ type 2 diabetes about 2 decades ago. Daughter noted patient's A1c usually fell in the 9% range, but in the past year, has been more elevated than before. She was hospitalized in 2023 at [facility] for altered mental status, secondary to UTI and on admission, was found to be hyperglycemic w/ blood sugar >500. She was discharged home on lantus 25 units at bedtime, januvia 100mg daily and metformin 1000mg twice a day. She was also referred to an endocrinology clinic but failed to follow up. The daughter noted she prepares the pill box, and patient self administers insulin. When asked about her diabetes medications, patient was unable to recall the specific doses of her medications, but noted taking metformin, januvia and insulin on a daily basis. She denied routine blood sugar checks. Her blood sugars have been managed by her primary care provider Dr. Salgado, though has not seen him recently., She also previously followed with an endocrinologist (does not remember name), most recently earlier this year. Patient mostly eats take out.. Diet consists of coffee, bagel,, chicken and soup on a regular basis. She also drinks a 12oz diet soda/iced tea daily.     PAST MEDICAL & SURGICAL HISTORY:  Hypertension  Diabetes  Hyperlipemia  CAD S/P percutaneous coronary angioplasty  Osteoarthritis  Urinary incontinence in female    H/O:   S/P cholecystectomy  S/P appendectomy  S/P rotator cuff surgery  History of hysteroscopy    FAMILY HISTORY:  T2DM- mother   Denied fam hx of thyroid disease.     Social History:  Lives at home with daughter  Ambulates w/ walker  Denied alcohol use  Former smoker, 10 pack years  (1/2 ppd for ~20 years, quit 40 years ago)   Denied illicit drug use     Outpatient Medications:  Home Medications:  amLODIPine 10 mg oral tablet: 1 tab(s) orally once a day (2023 14:28)  aspirin 81 mg oral tablet: 1 tab(s) orally once a day (2023 14:28)  Basaglar KwikPen 100 units/mL subcutaneous solution: 25 unit(s) subcutaneous once a day (at bedtime) (2023 13:23)  dexlansoprazole 60 mg oral delayed release capsule: 1 orally once a day (2023 14:18)  DULoxetine 30 mg oral delayed release capsule: 1 cap(s) orally once a day (2023 14:28)  gabapentin 600 mg oral tablet: 1 tab(s) orally 2 times a day (2023 14:28)  hydroCHLOROthiazide 12.5 mg oral tablet: 1 tab(s) orally once a day (2023 14:28)  hydrOXYzine hydrochloride 10 mg oral tablet: 1 orally once a day (at bedtime) (2023 14:24)  Januvia 100 mg oral tablet: 1 orally once a day (2023 14:22)  lysine 500 mg oral tablet: 1 tab(s) orally once a day (2023 14:28)  metFORMIN 1000 mg oral tablet: 1 tab(s) orally 2 times a day (2023 14:28)  pravastatin 40 mg oral tablet: 1 tab(s) orally once a day (2023 14:28)  tolterodine 4 mg oral capsule, extended release: 1 orally once a day (2023 14:22)    MEDICATIONS  (STANDING):  dextrose 5%. 1000 milliLiter(s) (50 mL/Hr) IV Continuous <Continuous>  dextrose 5%. 1000 milliLiter(s) (100 mL/Hr) IV Continuous <Continuous>  dextrose 50% Injectable 12.5 Gram(s) IV Push once  dextrose 50% Injectable 25 Gram(s) IV Push once  dextrose 50% Injectable 25 Gram(s) IV Push once  glucagon  Injectable 1 milliGRAM(s) IntraMuscular once  insulin lispro (ADMELOG) corrective regimen sliding scale   SubCutaneous every 6 hours  sodium chloride 0.9%. 1000 milliLiter(s) (75 mL/Hr) IV Continuous <Continuous>    MEDICATIONS  (PRN):  dextrose Oral Gel 15 Gram(s) Oral once PRN Blood Glucose LESS THAN 70 milliGRAM(s)/deciliter    Allergies  sulfa drugs (Unknown)    Review of Systems:  Constitutional: No fever  Eyes: No blurry vision  Neuro: No tremors  HEENT: No pain  Cardiovascular: No chest pain, palpitations  Respiratory: No SOB, no cough  GI: No nausea, vomiting, abdominal pain  : No dysuria  Skin: no rash  Psych: no depression  Endocrine: +polyuria, +polydipsia   Hem/lymph: no swelling  Osteoporosis: no fractures    PHYSICAL EXAM:  VITALS: T(C): 36.8 (10-05-23 @ 09:21)  T(F): 98.2 (10-05-23 @ 09:21), Max: 101.9 (10-04-23 @ 20:23)  HR: 75 (10-05-23 @ 09:21) (74 - 101)  BP: 140/81 (10-05-23 @ 09:21) (109/59 - 147/83)  RR:  (15 - 22)  SpO2:  (89% - 100%)  Wt(kg): --  GENERAL: NAD, well-groomed, resting in bed comfortably, obese  EYES: No proptosis, no lid lag, anicteric  HEENT:  Atraumatic, Normocephalic, moist mucous membranes  THYROID: Normal size, no palpable nodules  RESPIRATORY: Clear to auscultation bilaterally; No rales, rhonchi, wheezing  CARDIOVASCULAR: Regular rate and rhythm; No murmurs; no peripheral edema  GI: Soft, nontender, non distended, normal bowel sounds  SKIN: Dry, intact, No rashes or lesions  MUSCULOSKELETAL: Full range of motion, normal strength  NEURO: sensation intact, extraocular movements intact, no tremor  PSYCH: Alert and oriented x 2, normal affect, normal mood  CUSHING'S SIGNS: no striae    CAPILLARY BLOOD GLUCOSE    POCT Blood Glucose.: 408 mg/dL (05 Oct 2023 05:26)  POCT Blood Glucose.: 418 mg/dL (05 Oct 2023 04:44)  POCT Blood Glucose.: 530 mg/dL (05 Oct 2023 04:34)  POCT Blood Glucose.: 423 mg/dL (05 Oct 2023 03:43)  POCT Blood Glucose.: 418 mg/dL (05 Oct 2023 00:47)  600 (04 Oct 2023 19:39)  POCT Blood Glucose.: 540 mg/dL (04 Oct 2023 18:25)  POCT Blood Glucose.: >600 mg/dL (04 Oct 2023 18:23)               13.2   9.24  )-----------( 164      ( 05 Oct 2023 04:45 )             40.4     10-05    133<L>  |  96<L>  |  17  ----------------------------<  478<HH>  4.3   |  23  |  0.87    eGFR: 70    Ca    8.6      10-05  Mg     1.60     10-05  Phos  3.7     10-05    TPro  6.7  /  Alb  3.6  /  TBili  0.5  /  DBili  x   /  AST  21  /  ALT  18  /  AlkPhos  77  10-04      Thyroid Function Tests:  10-05 @ 04:45 TSH 2.32 FreeT4 -- T3 -- Anti TPO -- Anti Thyroglobulin Ab -- TSI --    A1C with Estimated Average Glucose Result: 15.7 % (10-04-23 @ 19:20)  A1C with Estimated Average Glucose Result: 14.0 % (23 @ 07:26)  A1C with Estimated Average Glucose Result: 13.7 % (23 @ 05:47)      10-05 Chol 127 Direct LDL -- LDL calculated 46 HDL 41<L> Trig 198<H>    Radiology:  < from: CT Brain Stroke Protocol (10.04.23 @ 19:58) >    COMPARISON: CT head 2023    FINDINGS:    The ventricular and sulcal size and configuration is age appropriate.     There is no acute loss of gray-white differentiation. There are mild   patchy areas of hypodensity in the periventricular and subcortical white   matter which are likely related to chronic microangiopathic changes.    There is no evidence of mass effect, midline shift, acute intracranial   hemorrhage, or extra-axial collections.     The calvarium is intact. The paranasal sinuses are clear.The mastoid air   cells are predominantly clear. The orbits are unremarkable.      IMPRESSION:  No acute intracranial hemorrhage or acute territorial infarction.    < end of copied text >             NOTE INCOMPLETE/ IN PROGRESS  *Please wait for attending attestation for official recommendations.     HPI:  This is a 72 y/o F with PMHx of CAD s/p PCI on aspirin, poorly-controlled T2DM on insulin, HTN and HLD who presented to the ED for altered mental status. Per daughter at bedside, she found patient in bed and confused after retuning from work yesterday afternoon. She checked the patient's blood sugar, found her to be very hyperglycemic and thus called EMS to bring her to the ED for further evaluation. Per daughter, patient becomes confused when blood sugars are elevated or when there is a urinary tract infection. She was started on a macrobid treatment about two days ago for possible urinary tract infection. Patient endorsed recent polydipsia, polyuria and nausea. Denied any fever, chills, poor appetite, chest pain, sob, ab pain or dysuria.     Patient was diagnosed w/ type 2 diabetes about 2 decades ago. Daughter noted patient's A1c usually fell in the 9% range, but in the past year, has been more elevated than before. She was hospitalized in 2023 at [facility] for altered mental status, secondary to UTI and on admission, was found to be hyperglycemic w/ blood sugar >500. She was discharged home on lantus 25 units at bedtime, januvia 100mg daily and metformin 1000mg twice a day. She was also referred to an endocrinology clinic but failed to follow up. The daughter noted she prepares the pill box, and patient self administers insulin. When asked about her diabetes medications, patient was unable to recall the specific doses of her medications, but noted taking metformin, januvia and insulin on a daily basis. She denied routine blood sugar checks. Her blood sugars have been managed by her primary care provider Dr. Salgado, though she has not seen him recently., She also previously followed with an endocrinologist (does not remember name), most recently earlier this year. Patient mostly eats take out. Diet consists of coffee, bagel,, chicken and soup on a regular basis. She also drinks a 12oz diet soda/iced tea daily.     PAST MEDICAL & SURGICAL HISTORY:  Hypertension  Diabetes  Hyperlipemia  CAD S/P percutaneous coronary angioplasty  Osteoarthritis  Urinary incontinence in female    H/O:   S/P cholecystectomy  S/P appendectomy  S/P rotator cuff surgery  History of hysteroscopy    FAMILY HISTORY:  T2DM- mother   Denied fam hx of thyroid disease.     Social History:  Lives at home with daughter  Ambulates w/ walker  Denied alcohol use  Former smoker, 10 pack years  (1/2 ppd for ~20 years, quit 40 years ago)   Denied illicit drug use     Outpatient Medications:  Home Medications:  amLODIPine 10 mg oral tablet: 1 tab(s) orally once a day (2023 14:28)  aspirin 81 mg oral tablet: 1 tab(s) orally once a day (2023 14:28)  Basaglar KwikPen 100 units/mL subcutaneous solution: 25 unit(s) subcutaneous once a day (at bedtime) (2023 13:23)  dexlansoprazole 60 mg oral delayed release capsule: 1 orally once a day (2023 14:18)  DULoxetine 30 mg oral delayed release capsule: 1 cap(s) orally once a day (2023 14:28)  gabapentin 600 mg oral tablet: 1 tab(s) orally 2 times a day (2023 14:28)  hydroCHLOROthiazide 12.5 mg oral tablet: 1 tab(s) orally once a day (2023 14:28)  hydrOXYzine hydrochloride 10 mg oral tablet: 1 orally once a day (at bedtime) (2023 14:24)  Januvia 100 mg oral tablet: 1 orally once a day (2023 14:22)  lysine 500 mg oral tablet: 1 tab(s) orally once a day (2023 14:28)  metFORMIN 1000 mg oral tablet: 1 tab(s) orally 2 times a day (2023 14:28)  pravastatin 40 mg oral tablet: 1 tab(s) orally once a day (2023 14:28)  tolterodine 4 mg oral capsule, extended release: 1 orally once a day (2023 14:22)    MEDICATIONS  (STANDING):  dextrose 5%. 1000 milliLiter(s) (50 mL/Hr) IV Continuous <Continuous>  dextrose 5%. 1000 milliLiter(s) (100 mL/Hr) IV Continuous <Continuous>  dextrose 50% Injectable 12.5 Gram(s) IV Push once  dextrose 50% Injectable 25 Gram(s) IV Push once  dextrose 50% Injectable 25 Gram(s) IV Push once  glucagon  Injectable 1 milliGRAM(s) IntraMuscular once  insulin lispro (ADMELOG) corrective regimen sliding scale   SubCutaneous every 6 hours  sodium chloride 0.9%. 1000 milliLiter(s) (75 mL/Hr) IV Continuous <Continuous>    MEDICATIONS  (PRN):  dextrose Oral Gel 15 Gram(s) Oral once PRN Blood Glucose LESS THAN 70 milliGRAM(s)/deciliter    Allergies  sulfa drugs (Unknown)    Review of Systems:  Constitutional: No fever  Eyes: No blurry vision  Neuro: No tremors  HEENT: No pain  Cardiovascular: No chest pain, palpitations  Respiratory: No SOB, no cough  GI: No nausea, vomiting, abdominal pain  : No dysuria  Skin: no rash  Psych: no depression  Endocrine: +polyuria, +polydipsia   Hem/lymph: no swelling  Osteoporosis: no fractures    PHYSICAL EXAM:  VITALS: T(C): 36.8 (10-05-23 @ 09:21)  T(F): 98.2 (10-05-23 @ 09:21), Max: 101.9 (10-04-23 @ 20:23)  HR: 75 (10-05-23 @ 09:21) (74 - 101)  BP: 140/81 (10-05-23 @ 09:21) (109/59 - 147/83)  RR:  (15 - 22)  SpO2:  (89% - 100%)  Wt(kg): --  GENERAL: NAD, well-groomed, resting in bed comfortably, obese  EYES: No proptosis, no lid lag, anicteric  HEENT:  Atraumatic, Normocephalic, moist mucous membranes  THYROID: Normal size, no palpable nodules  RESPIRATORY: Clear to auscultation bilaterally; No rales, rhonchi, wheezing  CARDIOVASCULAR: Regular rate and rhythm; No murmurs; no peripheral edema  GI: Soft, nontender, non distended, normal bowel sounds  SKIN: Dry, intact, No rashes or lesions  MUSCULOSKELETAL: Full range of motion, normal strength  NEURO: sensation intact, extraocular movements intact, no tremor  PSYCH: Alert and oriented x 2, normal affect, normal mood  CUSHING'S SIGNS: no striae    CAPILLARY BLOOD GLUCOSE    POCT Blood Glucose.: 408 mg/dL (05 Oct 2023 05:26)  POCT Blood Glucose.: 418 mg/dL (05 Oct 2023 04:44)  POCT Blood Glucose.: 530 mg/dL (05 Oct 2023 04:34)  POCT Blood Glucose.: 423 mg/dL (05 Oct 2023 03:43)  POCT Blood Glucose.: 418 mg/dL (05 Oct 2023 00:47)  600 (04 Oct 2023 19:39)  POCT Blood Glucose.: 540 mg/dL (04 Oct 2023 18:25)  POCT Blood Glucose.: >600 mg/dL (04 Oct 2023 18:23)               13.2   9.24  )-----------( 164      ( 05 Oct 2023 04:45 )             40.4     10-05    133<L>  |  96<L>  |  17  ----------------------------<  478<HH>  4.3   |  23  |  0.87    eGFR: 70    Ca    8.6      10-05  Mg     1.60     10-05  Phos  3.7     10-05    TPro  6.7  /  Alb  3.6  /  TBili  0.5  /  DBili  x   /  AST  21  /  ALT  18  /  AlkPhos  77  10-04      Thyroid Function Tests:  10-05 @ 04:45 TSH 2.32 FreeT4 -- T3 -- Anti TPO -- Anti Thyroglobulin Ab -- TSI --    A1C with Estimated Average Glucose Result: 15.7 % (10-04-23 @ 19:20)  A1C with Estimated Average Glucose Result: 14.0 % (23 @ 07:26)  A1C with Estimated Average Glucose Result: 13.7 % (23 @ 05:47)      10-05 Chol 127 Direct LDL -- LDL calculated 46 HDL 41<L> Trig 198<H>    Radiology:  < from: CT Brain Stroke Protocol (10.04.23 @ 19:58) >    COMPARISON: CT head 2023    FINDINGS:    The ventricular and sulcal size and configuration is age appropriate.     There is no acute loss of gray-white differentiation. There are mild   patchy areas of hypodensity in the periventricular and subcortical white   matter which are likely related to chronic microangiopathic changes.    There is no evidence of mass effect, midline shift, acute intracranial   hemorrhage, or extra-axial collections.     The calvarium is intact. The paranasal sinuses are clear.The mastoid air   cells are predominantly clear. The orbits are unremarkable.      IMPRESSION:  No acute intracranial hemorrhage or acute territorial infarction.    < end of copied text >               HPI:  This is a 74 y/o F with PMHx of CAD s/p PCI on aspirin, poorly-controlled T2DM on insulin, HTN and HLD who presented to the ED for altered mental status. Per daughter at bedside, she found patient in bed and confused after retuning from work yesterday afternoon. She checked the patient's blood sugar, found her to be very hyperglycemic and thus called EMS to bring her to the ED for further evaluation. Per daughter, patient becomes confused when blood sugars are elevated or when there is a urinary tract infection. She was started on a macrobid treatment about two days ago for possible urinary tract infection. Patient endorsed recent polydipsia, polyuria and nausea. Denied any fever, chills, poor appetite, chest pain, sob, ab pain or dysuria.     Patient was diagnosed w/ type 2 diabetes about 2 decades ago. Daughter noted patient's A1c usually fell in the 9% range, but in the past year, has been more elevated than before. She was hospitalized in 2023 at [facility] for altered mental status, secondary to UTI and on admission, was found to be hyperglycemic w/ blood sugar >500. She was discharged home on lantus 25 units at bedtime, januvia 100mg daily and metformin 1000mg twice a day. She was also referred to an endocrinology clinic but failed to follow up. The daughter noted she prepares the pill box, and patient self administers insulin. When asked about her diabetes medications, patient was unable to recall the specific doses of her medications, but noted taking metformin, januvia and insulin on a daily basis. She denied routine blood sugar checks. Her blood sugars have been managed by her primary care provider Dr. Salgado, though she has not seen him recently., She also previously followed with an endocrinologist (does not remember name), most recently earlier this year. Patient mostly eats take out. Diet consists of coffee, bagel,, chicken and soup on a regular basis. She also drinks a 12oz diet soda/iced tea daily.     PAST MEDICAL & SURGICAL HISTORY:  Hypertension  Diabetes  Hyperlipemia  CAD S/P percutaneous coronary angioplasty  Osteoarthritis  Urinary incontinence in female    H/O:   S/P cholecystectomy  S/P appendectomy  S/P rotator cuff surgery  History of hysteroscopy    FAMILY HISTORY:  T2DM- mother   Denied fam hx of thyroid disease.     Social History:  Lives at home with daughter  Ambulates w/ walker  Denied alcohol use  Former smoker, 10 pack years  (1/2 ppd for ~20 years, quit 40 years ago)   Denied illicit drug use     Outpatient Medications:  Home Medications:  amLODIPine 10 mg oral tablet: 1 tab(s) orally once a day (2023 14:28)  aspirin 81 mg oral tablet: 1 tab(s) orally once a day (2023 14:28)  Basaglar KwikPen 100 units/mL subcutaneous solution: 25 unit(s) subcutaneous once a day (at bedtime) (2023 13:23)  dexlansoprazole 60 mg oral delayed release capsule: 1 orally once a day (2023 14:18)  DULoxetine 30 mg oral delayed release capsule: 1 cap(s) orally once a day (2023 14:28)  gabapentin 600 mg oral tablet: 1 tab(s) orally 2 times a day (2023 14:28)  hydroCHLOROthiazide 12.5 mg oral tablet: 1 tab(s) orally once a day (2023 14:28)  hydrOXYzine hydrochloride 10 mg oral tablet: 1 orally once a day (at bedtime) (2023 14:24)  Januvia 100 mg oral tablet: 1 orally once a day (2023 14:22)  lysine 500 mg oral tablet: 1 tab(s) orally once a day (2023 14:28)  metFORMIN 1000 mg oral tablet: 1 tab(s) orally 2 times a day (2023 14:28)  pravastatin 40 mg oral tablet: 1 tab(s) orally once a day (2023 14:28)  tolterodine 4 mg oral capsule, extended release: 1 orally once a day (2023 14:22)    MEDICATIONS  (STANDING):  dextrose 5%. 1000 milliLiter(s) (50 mL/Hr) IV Continuous <Continuous>  dextrose 5%. 1000 milliLiter(s) (100 mL/Hr) IV Continuous <Continuous>  dextrose 50% Injectable 12.5 Gram(s) IV Push once  dextrose 50% Injectable 25 Gram(s) IV Push once  dextrose 50% Injectable 25 Gram(s) IV Push once  glucagon  Injectable 1 milliGRAM(s) IntraMuscular once  insulin lispro (ADMELOG) corrective regimen sliding scale   SubCutaneous every 6 hours  sodium chloride 0.9%. 1000 milliLiter(s) (75 mL/Hr) IV Continuous <Continuous>    MEDICATIONS  (PRN):  dextrose Oral Gel 15 Gram(s) Oral once PRN Blood Glucose LESS THAN 70 milliGRAM(s)/deciliter    Allergies  sulfa drugs (Unknown)    Review of Systems:  Constitutional: No fever  Eyes: No blurry vision  Neuro: No tremors  HEENT: No pain  Cardiovascular: No chest pain, palpitations  Respiratory: No SOB, no cough  GI: No nausea, vomiting, abdominal pain  : No dysuria  Skin: no rash  Psych: no depression  Endocrine: +polyuria, +polydipsia   Hem/lymph: no swelling  Osteoporosis: no fractures    PHYSICAL EXAM:  VITALS: T(C): 36.8 (10-05-23 @ 09:21)  T(F): 98.2 (10-05-23 @ 09:21), Max: 101.9 (10-04-23 @ 20:23)  HR: 75 (10-05-23 @ 09:21) (74 - 101)  BP: 140/81 (10-05-23 @ 09:21) (109/59 - 147/83)  RR:  (15 - 22)  SpO2:  (89% - 100%)  Wt(kg): --  GENERAL: NAD, well-groomed, resting in bed comfortably, obese  EYES: No proptosis, no lid lag, anicteric  HEENT:  Atraumatic, Normocephalic, moist mucous membranes  THYROID: Normal size, no palpable nodules  RESPIRATORY: Clear to auscultation bilaterally; No rales, rhonchi, wheezing  CARDIOVASCULAR: Regular rate and rhythm; No murmurs; no peripheral edema  GI: Soft, nontender, non distended, normal bowel sounds  SKIN: Dry, intact, No rashes or lesions  MUSCULOSKELETAL: Full range of motion, normal strength  NEURO: sensation intact, extraocular movements intact, no tremor  PSYCH: Alert and oriented x 2, normal affect, normal mood  CUSHING'S SIGNS: no striae    CAPILLARY BLOOD GLUCOSE    POCT Blood Glucose.: 408 mg/dL (05 Oct 2023 05:26)  POCT Blood Glucose.: 418 mg/dL (05 Oct 2023 04:44)  POCT Blood Glucose.: 530 mg/dL (05 Oct 2023 04:34)  POCT Blood Glucose.: 423 mg/dL (05 Oct 2023 03:43)  POCT Blood Glucose.: 418 mg/dL (05 Oct 2023 00:47)  600 (04 Oct 2023 19:39)  POCT Blood Glucose.: 540 mg/dL (04 Oct 2023 18:25)  POCT Blood Glucose.: >600 mg/dL (04 Oct 2023 18:23)               13.2   9.24  )-----------( 164      ( 05 Oct 2023 04:45 )             40.4     10-05    133<L>  |  96<L>  |  17  ----------------------------<  478<HH>  4.3   |  23  |  0.87    eGFR: 70    Ca    8.6      10-05  Mg     1.60     10-05  Phos  3.7     10-05    TPro  6.7  /  Alb  3.6  /  TBili  0.5  /  DBili  x   /  AST  21  /  ALT  18  /  AlkPhos  77  10-04      Thyroid Function Tests:  10-05 @ 04:45 TSH 2.32 FreeT4 -- T3 -- Anti TPO -- Anti Thyroglobulin Ab -- TSI --    A1C with Estimated Average Glucose Result: 15.7 % (10-04-23 @ 19:20)  A1C with Estimated Average Glucose Result: 14.0 % (23 @ 07:26)  A1C with Estimated Average Glucose Result: 13.7 % (23 @ 05:47)      10-05 Chol 127 Direct LDL -- LDL calculated 46 HDL 41<L> Trig 198<H>    Radiology:  < from: CT Brain Stroke Protocol (10.04.23 @ 19:58) >    COMPARISON: CT head 2023    FINDINGS:    The ventricular and sulcal size and configuration is age appropriate.     There is no acute loss of gray-white differentiation. There are mild   patchy areas of hypodensity in the periventricular and subcortical white   matter which are likely related to chronic microangiopathic changes.    There is no evidence of mass effect, midline shift, acute intracranial   hemorrhage, or extra-axial collections.     The calvarium is intact. The paranasal sinuses are clear.The mastoid air   cells are predominantly clear. The orbits are unremarkable.      IMPRESSION:  No acute intracranial hemorrhage or acute territorial infarction.    < end of copied text >

## 2023-10-05 NOTE — CHART NOTE - NSCHARTNOTEFT_GEN_A_CORE
dysphagia screen done passed; AXR neg; weight noted will start 25 units lantus qhs and 8 u QAC with meals. f/u endocrine recs

## 2023-10-05 NOTE — H&P ADULT - NSHPPHYSICALEXAM_GEN_ALL_CORE
Vital Signs Last 24 Hrs  T(C): 36.8 (05 Oct 2023 09:21), Max: 38.8 (04 Oct 2023 20:23)  T(F): 98.2 (05 Oct 2023 09:21), Max: 101.9 (04 Oct 2023 20:23)  HR: 75 (05 Oct 2023 09:21) (74 - 101)  BP: 140/81 (05 Oct 2023 09:21) (109/59 - 147/83)  BP(mean): --  RR: 20 (05 Oct 2023 09:21) (15 - 22)  SpO2: 99% (05 Oct 2023 09:21) (89% - 100%)    Parameters below as of 05 Oct 2023 09:21  Patient On (Oxygen Delivery Method): nasal cannula  O2 Flow (L/min): 2      PHYSICAL EXAM:  GENERAL: NAD, well-developed  HEAD:  Atraumatic, Normocephalic  EYES: EOMI, PERRLA, conjunctiva and sclera clear  NECK: Supple, No JVD  CHEST/LUNG: Clear to auscultation bilaterally; No wheeze  HEART: Regular rate and rhythm; No murmurs, rubs, or gallops  ABDOMEN: Soft, Nontender, Nondistended; Bowel sounds present  EXTREMITIES:  2+ Peripheral Pulses, No clubbing, cyanosis, or edema  PSYCH: AAOx2-3;   NEUROLOGY: non-focal

## 2023-10-05 NOTE — H&P ADULT - PROBLEM SELECTOR PLAN 1
multifactorial due to sepsis from partially treated UTI/Hyperosmolar state (see below)  - Meets SIRS criteria with 101.9 fever + WBC 13 + lactate 2.4 partially treated UTI rx nitrofurantoin 9/29  - CT head neg’ CXR neg   - UA neg; COVID/RVP negative;   - BCx, UCx, procal,  - check repeat lactate  - check dysphagia screen  - c/w zosyn for now

## 2023-10-05 NOTE — H&P ADULT - ASSESSMENT
72 yo F w/ hx HTN, HLD. DM-2, currently being treated for UTI p/w acute metabolic encephalopathy due to UTI and poss hyperosmolar state

## 2023-10-05 NOTE — PROVIDER CONTACT NOTE (OTHER) - ACTION/TREATMENT ORDERED:
RN will attempt to ask a resident to try USG IV. will continue to monitor Pt. will pass info along to night RN.

## 2023-10-06 DIAGNOSIS — A41.9 SEPSIS, UNSPECIFIED ORGANISM: ICD-10-CM

## 2023-10-06 DIAGNOSIS — G93.41 METABOLIC ENCEPHALOPATHY: ICD-10-CM

## 2023-10-06 LAB
ALBUMIN SERPL ELPH-MCNC: 2.9 G/DL — LOW (ref 3.3–5)
ALP SERPL-CCNC: 65 U/L — SIGNIFICANT CHANGE UP (ref 40–120)
ALT FLD-CCNC: 28 U/L — SIGNIFICANT CHANGE UP (ref 4–33)
ANION GAP SERPL CALC-SCNC: 13 MMOL/L — SIGNIFICANT CHANGE UP (ref 7–14)
AST SERPL-CCNC: 29 U/L — SIGNIFICANT CHANGE UP (ref 4–32)
BASOPHILS # BLD AUTO: 0.05 K/UL — SIGNIFICANT CHANGE UP (ref 0–0.2)
BASOPHILS NFR BLD AUTO: 0.8 % — SIGNIFICANT CHANGE UP (ref 0–2)
BILIRUB SERPL-MCNC: 0.3 MG/DL — SIGNIFICANT CHANGE UP (ref 0.2–1.2)
BUN SERPL-MCNC: 20 MG/DL — SIGNIFICANT CHANGE UP (ref 7–23)
CALCIUM SERPL-MCNC: 9.1 MG/DL — SIGNIFICANT CHANGE UP (ref 8.4–10.5)
CHLORIDE SERPL-SCNC: 99 MMOL/L — SIGNIFICANT CHANGE UP (ref 98–107)
CO2 SERPL-SCNC: 20 MMOL/L — LOW (ref 22–31)
CREAT SERPL-MCNC: 0.9 MG/DL — SIGNIFICANT CHANGE UP (ref 0.5–1.3)
CULTURE RESULTS: SIGNIFICANT CHANGE UP
EGFR: 68 ML/MIN/1.73M2 — SIGNIFICANT CHANGE UP
EOSINOPHIL # BLD AUTO: 0.25 K/UL — SIGNIFICANT CHANGE UP (ref 0–0.5)
EOSINOPHIL NFR BLD AUTO: 3.8 % — SIGNIFICANT CHANGE UP (ref 0–6)
GLUCOSE BLDC GLUCOMTR-MCNC: 157 MG/DL — HIGH (ref 70–99)
GLUCOSE BLDC GLUCOMTR-MCNC: 187 MG/DL — HIGH (ref 70–99)
GLUCOSE BLDC GLUCOMTR-MCNC: 289 MG/DL — HIGH (ref 70–99)
GLUCOSE BLDC GLUCOMTR-MCNC: 352 MG/DL — HIGH (ref 70–99)
GLUCOSE SERPL-MCNC: 274 MG/DL — HIGH (ref 70–99)
HCT VFR BLD CALC: 39.7 % — SIGNIFICANT CHANGE UP (ref 34.5–45)
HGB BLD-MCNC: 13 G/DL — SIGNIFICANT CHANGE UP (ref 11.5–15.5)
IANC: 3.91 K/UL — SIGNIFICANT CHANGE UP (ref 1.8–7.4)
IMM GRANULOCYTES NFR BLD AUTO: 0.3 % — SIGNIFICANT CHANGE UP (ref 0–0.9)
LYMPHOCYTES # BLD AUTO: 1.7 K/UL — SIGNIFICANT CHANGE UP (ref 1–3.3)
LYMPHOCYTES # BLD AUTO: 25.6 % — SIGNIFICANT CHANGE UP (ref 13–44)
MCHC RBC-ENTMCNC: 28.5 PG — SIGNIFICANT CHANGE UP (ref 27–34)
MCHC RBC-ENTMCNC: 32.7 GM/DL — SIGNIFICANT CHANGE UP (ref 32–36)
MCV RBC AUTO: 87.1 FL — SIGNIFICANT CHANGE UP (ref 80–100)
MONOCYTES # BLD AUTO: 0.72 K/UL — SIGNIFICANT CHANGE UP (ref 0–0.9)
MONOCYTES NFR BLD AUTO: 10.8 % — SIGNIFICANT CHANGE UP (ref 2–14)
NEUTROPHILS # BLD AUTO: 3.91 K/UL — SIGNIFICANT CHANGE UP (ref 1.8–7.4)
NEUTROPHILS NFR BLD AUTO: 58.7 % — SIGNIFICANT CHANGE UP (ref 43–77)
NRBC # BLD: 0 /100 WBCS — SIGNIFICANT CHANGE UP (ref 0–0)
NRBC # FLD: 0 K/UL — SIGNIFICANT CHANGE UP (ref 0–0)
PLATELET # BLD AUTO: 162 K/UL — SIGNIFICANT CHANGE UP (ref 150–400)
POTASSIUM SERPL-MCNC: 3.7 MMOL/L — SIGNIFICANT CHANGE UP (ref 3.5–5.3)
POTASSIUM SERPL-SCNC: 3.7 MMOL/L — SIGNIFICANT CHANGE UP (ref 3.5–5.3)
PROT SERPL-MCNC: 5.9 G/DL — LOW (ref 6–8.3)
RBC # BLD: 4.56 M/UL — SIGNIFICANT CHANGE UP (ref 3.8–5.2)
RBC # FLD: 13.6 % — SIGNIFICANT CHANGE UP (ref 10.3–14.5)
SODIUM SERPL-SCNC: 132 MMOL/L — LOW (ref 135–145)
SPECIMEN SOURCE: SIGNIFICANT CHANGE UP
WBC # BLD: 6.65 K/UL — SIGNIFICANT CHANGE UP (ref 3.8–10.5)
WBC # FLD AUTO: 6.65 K/UL — SIGNIFICANT CHANGE UP (ref 3.8–10.5)

## 2023-10-06 PROCEDURE — 99233 SBSQ HOSP IP/OBS HIGH 50: CPT | Mod: GC

## 2023-10-06 PROCEDURE — 99233 SBSQ HOSP IP/OBS HIGH 50: CPT

## 2023-10-06 RX ORDER — INFLUENZA VIRUS VACCINE 15; 15; 15; 15 UG/.5ML; UG/.5ML; UG/.5ML; UG/.5ML
0.7 SUSPENSION INTRAMUSCULAR ONCE
Refills: 0 | Status: DISCONTINUED | OUTPATIENT
Start: 2023-10-05 | End: 2023-10-08

## 2023-10-06 RX ORDER — INSULIN GLARGINE 100 [IU]/ML
30 INJECTION, SOLUTION SUBCUTANEOUS AT BEDTIME
Refills: 0 | Status: DISCONTINUED | OUTPATIENT
Start: 2023-10-06 | End: 2023-10-08

## 2023-10-06 RX ORDER — ASPIRIN/CALCIUM CARB/MAGNESIUM 324 MG
81 TABLET ORAL DAILY
Refills: 0 | Status: DISCONTINUED | OUTPATIENT
Start: 2023-10-06 | End: 2023-10-08

## 2023-10-06 RX ORDER — INSULIN LISPRO 100/ML
11 VIAL (ML) SUBCUTANEOUS
Refills: 0 | Status: DISCONTINUED | OUTPATIENT
Start: 2023-10-06 | End: 2023-10-08

## 2023-10-06 RX ADMIN — Medication 650 MILLIGRAM(S): at 19:37

## 2023-10-06 RX ADMIN — PIPERACILLIN AND TAZOBACTAM 25 GRAM(S): 4; .5 INJECTION, POWDER, LYOPHILIZED, FOR SOLUTION INTRAVENOUS at 05:28

## 2023-10-06 RX ADMIN — PIPERACILLIN AND TAZOBACTAM 25 GRAM(S): 4; .5 INJECTION, POWDER, LYOPHILIZED, FOR SOLUTION INTRAVENOUS at 12:35

## 2023-10-06 RX ADMIN — Medication 2: at 17:50

## 2023-10-06 RX ADMIN — Medication 81 MILLIGRAM(S): at 12:35

## 2023-10-06 RX ADMIN — INSULIN GLARGINE 30 UNIT(S): 100 INJECTION, SOLUTION SUBCUTANEOUS at 22:37

## 2023-10-06 RX ADMIN — Medication 6: at 09:06

## 2023-10-06 RX ADMIN — Medication 10: at 12:36

## 2023-10-06 RX ADMIN — Medication 8 UNIT(S): at 12:36

## 2023-10-06 RX ADMIN — Medication 8 UNIT(S): at 09:06

## 2023-10-06 RX ADMIN — SODIUM CHLORIDE 75 MILLILITER(S): 9 INJECTION INTRAMUSCULAR; INTRAVENOUS; SUBCUTANEOUS at 09:07

## 2023-10-06 NOTE — CHART NOTE - NSCHARTNOTEFT_GEN_A_CORE
ACP NIGHT MEDICINE COVERAGE.    Notified by RN, patient PIV access lossed, multiple nurses during day and evening attempted PIV access however unsuccessful. Pt seen, upon arrival patient laying in bed awake and alert, appears annoyed. Discussed importance of IV Antibiotic for treatment of urinary tract infection as patient failed outpt oral antibiotic therapy. Offered patient to obtain IV under ultrasound guidance, however patient still refusing. Discussed risks (worsening infection, urosepsis, AMS, JHON/ARF) and benefits (improvement of infection, safe medical discharge) of missing antibiotics. Pt reports understanding and willing to miss tonight's dose despite education. PIV to be attempted in the morning with AM Labs. RN at bedside throughout discussion. Will continue to monitor overnight.    Maureen Armijo PA  Medicine [phone]

## 2023-10-06 NOTE — PROGRESS NOTE ADULT - ATTENDING COMMENTS
73F uncontrolled DM2 HbA1c 15.7% with severe hyperglycemia 600s on admission and initial AMS consistent with HHS now improving with insulin therapy. Agree with basal bolus insulin increase doses today as outlined. Will follow to determine safe and optimal dc plan. Will consider adding cardioprotective meds, and given severely high A1c basal bolus would be needed.  Endocrine team consulted for uncontrolled diabetes. Patient is high risk with high level decision making due to uncontrolled diabetes which places patient at high risk for cardiovascular and cerebrovascular events. Patient with lability of glucose requiring close monitoring and insulin adjustments.    Carl King MD  Division of Endocrinology  Pager: [phone]    If after 6PM or before 9AM, or on weekends/holidays, please call endocrine answering service for assistance ([phone]).  For nonurgent matters email [email or web address] for assistance.

## 2023-10-06 NOTE — PROGRESS NOTE ADULT - SUBJECTIVE AND OBJECTIVE BOX
NOTE INCOMPLETE/ IN PROGRESS  *Please wait for attending attestation for official recommendations.     Chief Complaint:     History:    MEDICATIONS  (STANDING):  dextrose 5%. 1000 milliLiter(s) (100 mL/Hr) IV Continuous <Continuous>  dextrose 5%. 1000 milliLiter(s) (50 mL/Hr) IV Continuous <Continuous>  dextrose 50% Injectable 25 Gram(s) IV Push once  dextrose 50% Injectable 12.5 Gram(s) IV Push once  dextrose 50% Injectable 25 Gram(s) IV Push once  glucagon  Injectable 1 milliGRAM(s) IntraMuscular once  influenza  Vaccine (HIGH DOSE) 0.7 milliLiter(s) IntraMuscular once  insulin glargine Injectable (LANTUS) 25 Unit(s) SubCutaneous at bedtime  insulin lispro (ADMELOG) corrective regimen sliding scale   SubCutaneous three times a day before meals  insulin lispro (ADMELOG) corrective regimen sliding scale   SubCutaneous at bedtime  insulin lispro Injectable (ADMELOG) 8 Unit(s) SubCutaneous three times a day before meals  piperacillin/tazobactam IVPB.. 3.375 Gram(s) IV Intermittent every 8 hours  sodium chloride 0.9%. 1000 milliLiter(s) (75 mL/Hr) IV Continuous <Continuous>    MEDICATIONS  (PRN):  acetaminophen     Tablet .. 650 milliGRAM(s) Oral every 6 hours PRN Temp greater or equal to 38C (100.4F), Mild Pain (1 - 3)  aluminum hydroxide/magnesium hydroxide/simethicone Suspension 30 milliLiter(s) Oral every 4 hours PRN Dyspepsia  dextrose Oral Gel 15 Gram(s) Oral once PRN Blood Glucose LESS THAN 70 milliGRAM(s)/deciliter  melatonin 3 milliGRAM(s) Oral at bedtime PRN Insomnia  ondansetron Injectable 4 milliGRAM(s) IV Push every 8 hours PRN Nausea and/or Vomiting      Allergies    sulfa drugs (Unknown)    Intolerances      Review of Systems:  Constitutional: No fever  Eyes: No blurry vision  Neuro: No tremors  HEENT: No pain  Cardiovascular: No chest pain, palpitations  Respiratory: No SOB, no cough  GI: No nausea, vomiting, abdominal pain  : No dysuria  Skin: no rash  Psych: no depression  Endocrine: no polyuria, polydipsia  Hem/lymph: no swelling  Osteoporosis: no fractures    ALL OTHER SYSTEMS REVIEWED AND NEGATIVE    UNABLE TO OBTAIN    PHYSICAL EXAM:  VITALS: T(C): 36.7 (10-06-23 @ 06:15)  T(F): 98 (10-06-23 @ 06:15), Max: 98 (10-05-23 @ 23:20)  HR: 79 (10-06-23 @ 06:15) (79 - 85)  BP: 116/70 (10-06-23 @ 06:15) (116/70 - 146/64)  RR:  (18 - 18)  SpO2:  (95% - 100%)  Wt(kg): --  GENERAL: NAD, well-groomed, well-developed  EYES: No proptosis, no lid lag, anicteric  HEENT:  Atraumatic, Normocephalic, moist mucous membranes  THYROID: Normal size, no palpable nodules  RESPIRATORY: Clear to auscultation bilaterally; No rales, rhonchi, wheezing  CARDIOVASCULAR: Regular rate and rhythm; No murmurs; no peripheral edema  GI: Soft, nontender, non distended  SKIN: Dry, intact, No rashes or lesions  MUSCULOSKELETAL: Full range of motion, normal strength  NEURO: extraocular movements intact, no tremor  PSYCH: Alert and oriented x 3, normal affect, normal mood    CAPILLARY BLOOD GLUCOSE      POCT Blood Glucose.: 289 mg/dL (06 Oct 2023 08:26)  POCT Blood Glucose.: 228 mg/dL (05 Oct 2023 21:44)  POCT Blood Glucose.: 263 mg/dL (05 Oct 2023 17:02)  POCT Blood Glucose.: 348 mg/dL (05 Oct 2023 12:42)      10-06    132<L>  |  99  |  20  ----------------------------<  274<H>  3.7   |  20<L>  |  0.90    eGFR: 68    Ca    9.1      10-06  Mg     1.60     10-05  Phos  3.7     10-05    TPro  5.9<L>  /  Alb  2.9<L>  /  TBili  0.3  /  DBili  x   /  AST  29  /  ALT  28  /  AlkPhos  65  10-06      A1C with Estimated Average Glucose Result: 15.7 % (10-04-23 @ 19:20)  A1C with Estimated Average Glucose Result: 14.0 % (06-06-23 @ 07:26)  A1C with Estimated Average Glucose Result: 13.7 % (06-05-23 @ 05:47)      Thyroid Function Tests:  10-05 @ 04:45 TSH 2.32 FreeT4 -- T3 -- Anti TPO -- Anti Thyroglobulin Ab -- TSI --                       NOTE INCOMPLETE/ IN PROGRESS  *Please wait for attending attestation for official recommendations.     Chief Complaint: Uncontrolled T2DM, HHS     History: No acute events overnight. Patient noted good appetite. Denied any fever, chills, chest pain, sob, ab pain, n/v.     MEDICATIONS  (STANDING):  dextrose 5%. 1000 milliLiter(s) (100 mL/Hr) IV Continuous <Continuous>  dextrose 5%. 1000 milliLiter(s) (50 mL/Hr) IV Continuous <Continuous>  dextrose 50% Injectable 25 Gram(s) IV Push once  dextrose 50% Injectable 12.5 Gram(s) IV Push once  dextrose 50% Injectable 25 Gram(s) IV Push once  glucagon  Injectable 1 milliGRAM(s) IntraMuscular once  influenza  Vaccine (HIGH DOSE) 0.7 milliLiter(s) IntraMuscular once  insulin glargine Injectable (LANTUS) 25 Unit(s) SubCutaneous at bedtime  insulin lispro (ADMELOG) corrective regimen sliding scale   SubCutaneous three times a day before meals  insulin lispro (ADMELOG) corrective regimen sliding scale   SubCutaneous at bedtime  insulin lispro Injectable (ADMELOG) 8 Unit(s) SubCutaneous three times a day before meals  piperacillin/tazobactam IVPB.. 3.375 Gram(s) IV Intermittent every 8 hours  sodium chloride 0.9%. 1000 milliLiter(s) (75 mL/Hr) IV Continuous <Continuous>    MEDICATIONS  (PRN):  acetaminophen     Tablet .. 650 milliGRAM(s) Oral every 6 hours PRN Temp greater or equal to 38C (100.4F), Mild Pain (1 - 3)  aluminum hydroxide/magnesium hydroxide/simethicone Suspension 30 milliLiter(s) Oral every 4 hours PRN Dyspepsia  dextrose Oral Gel 15 Gram(s) Oral once PRN Blood Glucose LESS THAN 70 milliGRAM(s)/deciliter  melatonin 3 milliGRAM(s) Oral at bedtime PRN Insomnia  ondansetron Injectable 4 milliGRAM(s) IV Push every 8 hours PRN Nausea and/or Vomiting    Allergies  sulfa drugs (Unknown)    Review of Systems:  Constitutional: No fever  Eyes: No blurry vision  Neuro: No tremors  HEENT: No pain  Cardiovascular: No chest pain, palpitations  Respiratory: No SOB, no cough  GI: No nausea, vomiting, abdominal pain  : No dysuria  Skin: no rash  Psych: no depression  Endocrine: no polyuria, polydipsia  Hem/lymph: no swelling  Osteoporosis: no fractures    ALL OTHER SYSTEMS REVIEWED AND NEGATIVE    UNABLE TO OBTAIN    PHYSICAL EXAM:  VITALS: T(C): 36.7 (10-06-23 @ 06:15)  T(F): 98 (10-06-23 @ 06:15), Max: 98 (10-05-23 @ 23:20)  HR: 79 (10-06-23 @ 06:15) (79 - 85)  BP: 116/70 (10-06-23 @ 06:15) (116/70 - 146/64)  RR:  (18 - 18)  SpO2:  (95% - 100%)  Wt(kg): --  GENERAL: NAD, appears stated age, well-developed  EYES: No proptosis, no lid lag, anicteric  HEENT:  Atraumatic, Normocephalic, moist mucous membranes  THYROID: Normal size, no palpable nodules  RESPIRATORY: Clear to auscultation bilaterally; No rales, rhonchi, wheezing  CARDIOVASCULAR: Regular rate and rhythm; No murmurs; no peripheral edema  GI: obese, soft, nontender, non distended  SKIN: Dry, intact, No rashes or lesions, no ulcers on b/l feet   MUSCULOSKELETAL: Full range of motion, normal strength  NEURO: extraocular movements intact, no tremor  PSYCH: Alert and oriented x 3, normal affect, normal mood    CAPILLARY BLOOD GLUCOSE  POCT Blood Glucose.: 352 mg/dL (06 Oct 2023 12:23)  POCT Blood Glucose.: 289 mg/dL (06 Oct 2023 08:26)  POCT Blood Glucose.: 228 mg/dL (05 Oct 2023 21:44)  POCT Blood Glucose.: 263 mg/dL (05 Oct 2023 17:02)    10-06    132<L>  |  99  |  20  ----------------------------<  274<H>  3.7   |  20<L>  |  0.90    eGFR: 68    Ca    9.1      10-06  Mg     1.60     10-05  Phos  3.7     10-05    TPro  5.9<L>  /  Alb  2.9<L>  /  TBili  0.3  /  DBili  x   /  AST  29  /  ALT  28  /  AlkPhos  65  10-06    A1C with Estimated Average Glucose Result: 15.7 % (10-04-23 @ 19:20)  A1C with Estimated Average Glucose Result: 14.0 % (06-06-23 @ 07:26)  A1C with Estimated Average Glucose Result: 13.7 % (06-05-23 @ 05:47)    Thyroid Function Tests:  10-05 @ 04:45 TSH 2.32 FreeT4 -- T3 -- Anti TPO -- Anti Thyroglobulin Ab -- TSI --                         Chief Complaint: Uncontrolled T2DM, HHS     History: No acute events overnight. Patient noted good appetite. Denied any fever, chills, chest pain, sob, ab pain, n/v.     MEDICATIONS  (STANDING):  dextrose 5%. 1000 milliLiter(s) (100 mL/Hr) IV Continuous <Continuous>  dextrose 5%. 1000 milliLiter(s) (50 mL/Hr) IV Continuous <Continuous>  dextrose 50% Injectable 25 Gram(s) IV Push once  dextrose 50% Injectable 12.5 Gram(s) IV Push once  dextrose 50% Injectable 25 Gram(s) IV Push once  glucagon  Injectable 1 milliGRAM(s) IntraMuscular once  influenza  Vaccine (HIGH DOSE) 0.7 milliLiter(s) IntraMuscular once  insulin glargine Injectable (LANTUS) 25 Unit(s) SubCutaneous at bedtime  insulin lispro (ADMELOG) corrective regimen sliding scale   SubCutaneous three times a day before meals  insulin lispro (ADMELOG) corrective regimen sliding scale   SubCutaneous at bedtime  insulin lispro Injectable (ADMELOG) 8 Unit(s) SubCutaneous three times a day before meals  piperacillin/tazobactam IVPB.. 3.375 Gram(s) IV Intermittent every 8 hours  sodium chloride 0.9%. 1000 milliLiter(s) (75 mL/Hr) IV Continuous <Continuous>    MEDICATIONS  (PRN):  acetaminophen     Tablet .. 650 milliGRAM(s) Oral every 6 hours PRN Temp greater or equal to 38C (100.4F), Mild Pain (1 - 3)  aluminum hydroxide/magnesium hydroxide/simethicone Suspension 30 milliLiter(s) Oral every 4 hours PRN Dyspepsia  dextrose Oral Gel 15 Gram(s) Oral once PRN Blood Glucose LESS THAN 70 milliGRAM(s)/deciliter  melatonin 3 milliGRAM(s) Oral at bedtime PRN Insomnia  ondansetron Injectable 4 milliGRAM(s) IV Push every 8 hours PRN Nausea and/or Vomiting    Allergies  sulfa drugs (Unknown)    Review of Systems:  Constitutional: No fever  Eyes: No blurry vision  Neuro: No tremors  HEENT: No pain  Cardiovascular: No chest pain, palpitations  Respiratory: No SOB, no cough  GI: No nausea, vomiting, abdominal pain  : No dysuria  Skin: no rash  Psych: no depression  Endocrine: no polyuria, polydipsia  Hem/lymph: no swelling  Osteoporosis: no fractures    ALL OTHER SYSTEMS REVIEWED AND NEGATIVE      PHYSICAL EXAM:  VITALS: T(C): 36.7 (10-06-23 @ 06:15)  T(F): 98 (10-06-23 @ 06:15), Max: 98 (10-05-23 @ 23:20)  HR: 79 (10-06-23 @ 06:15) (79 - 85)  BP: 116/70 (10-06-23 @ 06:15) (116/70 - 146/64)  RR:  (18 - 18)  SpO2:  (95% - 100%)  Wt(kg): --  GENERAL: NAD, appears stated age, well-developed  EYES: No proptosis, no lid lag, anicteric  HEENT:  Atraumatic, Normocephalic, moist mucous membranes  THYROID: Normal size, no palpable nodules  RESPIRATORY: Clear to auscultation bilaterally; No rales, rhonchi, wheezing  CARDIOVASCULAR: Regular rate and rhythm; No murmurs; no peripheral edema  GI: obese, soft, nontender, non distended  SKIN: Dry, intact, No rashes or lesions, no ulcers on b/l feet   MUSCULOSKELETAL: Full range of motion, normal strength  NEURO: extraocular movements intact, no tremor  PSYCH: Alert and oriented x 3, normal affect, normal mood    CAPILLARY BLOOD GLUCOSE  POCT Blood Glucose.: 352 mg/dL (06 Oct 2023 12:23)  POCT Blood Glucose.: 289 mg/dL (06 Oct 2023 08:26)  POCT Blood Glucose.: 228 mg/dL (05 Oct 2023 21:44)  POCT Blood Glucose.: 263 mg/dL (05 Oct 2023 17:02)    10-06    132<L>  |  99  |  20  ----------------------------<  274<H>  3.7   |  20<L>  |  0.90    eGFR: 68    Ca    9.1      10-06  Mg     1.60     10-05  Phos  3.7     10-05    TPro  5.9<L>  /  Alb  2.9<L>  /  TBili  0.3  /  DBili  x   /  AST  29  /  ALT  28  /  AlkPhos  65  10-06    A1C with Estimated Average Glucose Result: 15.7 % (10-04-23 @ 19:20)  A1C with Estimated Average Glucose Result: 14.0 % (06-06-23 @ 07:26)  A1C with Estimated Average Glucose Result: 13.7 % (06-05-23 @ 05:47)    Thyroid Function Tests:  10-05 @ 04:45 TSH 2.32 FreeT4 -- T3 -- Anti TPO -- Anti Thyroglobulin Ab -- TSI --

## 2023-10-06 NOTE — PROVIDER CONTACT NOTE (OTHER) - RECOMMENDATIONS
per provider, RN should ask residents in ED to attempt ultrasound guided IV
Pt educated on antibiotics and the importance of getting her dose, ACP made aware and came to the unit to educate patient. PT still refuses to get IV and zosyn

## 2023-10-06 NOTE — PHARMACOTHERAPY INTERVENTION NOTE - COMMENTS
Current medications reviewed with the patient. Current medication schedule was discussed in detail including: medication name, indication, dose, administration times, treatment duration, side effects, and special instructions. Also discussed DM management. Patient educated on A1c, insulin pen administration, hypoglycemia and treatment, healthy plate, exercise, and blood glucose monitoring. Counseled patient on where to inject insulin (abdomen, outer thighs), rotating injection site to prevent lipodystrophy, proper insulin storage, and sharps disposal. Patient reports adherence to medications, she lives with her daughter and her daughter reminds her to take her insulin at night. Patient able to perform return demo of insulin pen with some coaching. Advised patient to inject insulin at a 90 degree angle to avoid bending the needle. Discussed that she will most likely be discharged on basal bolus insulin (4 injections per day). Patient endorses not testing fingersticks that often, but states she gets samples of a CGM from her PCP. Patient encouraged for outpatient follow up with medicine and endocrine. Questions and concerns were answered and addressed.    Asya Fernandez, PharmD, [facility]  Clinical Pharmacy Specialist  [id number]

## 2023-10-06 NOTE — PROVIDER CONTACT NOTE (OTHER) - REASON
pt without IV, unable to obtain labs
Patient refused getting an IV for dose of antibiotics and fluids

## 2023-10-06 NOTE — PROGRESS NOTE ADULT - SUBJECTIVE AND OBJECTIVE BOX
[facility] Division of Hospital Medicine  Nikunj Brandon MD  In House Pager [phone]    Patient is a 73y old  Female who presents with a chief complaint of AMS (06 Oct 2023 10:12)    SUBJECTIVE / OVERNIGHT EVENTS: no acute events. patient is alert today. has no complaints. she endorsed left sided back pain. denied dysuria.     ROS: Denied Fever, Chill, CP, SOB, Abd pain, N/V/D, LE swelling or pain.     MEDICATIONS  (STANDING):  aspirin enteric coated 81 milliGRAM(s) Oral daily  dextrose 5%. 1000 milliLiter(s) (50 mL/Hr) IV Continuous <Continuous>  dextrose 5%. 1000 milliLiter(s) (100 mL/Hr) IV Continuous <Continuous>  dextrose 50% Injectable 12.5 Gram(s) IV Push once  dextrose 50% Injectable 25 Gram(s) IV Push once  dextrose 50% Injectable 25 Gram(s) IV Push once  glucagon  Injectable 1 milliGRAM(s) IntraMuscular once  influenza  Vaccine (HIGH DOSE) 0.7 milliLiter(s) IntraMuscular once  insulin glargine Injectable (LANTUS) 25 Unit(s) SubCutaneous at bedtime  insulin lispro (ADMELOG) corrective regimen sliding scale   SubCutaneous three times a day before meals  insulin lispro (ADMELOG) corrective regimen sliding scale   SubCutaneous at bedtime  insulin lispro Injectable (ADMELOG) 8 Unit(s) SubCutaneous three times a day before meals  piperacillin/tazobactam IVPB.. 3.375 Gram(s) IV Intermittent every 8 hours  sodium chloride 0.9%. 1000 milliLiter(s) (75 mL/Hr) IV Continuous <Continuous>    MEDICATIONS  (PRN):  acetaminophen     Tablet .. 650 milliGRAM(s) Oral every 6 hours PRN Temp greater or equal to 38C (100.4F), Mild Pain (1 - 3)  aluminum hydroxide/magnesium hydroxide/simethicone Suspension 30 milliLiter(s) Oral every 4 hours PRN Dyspepsia  dextrose Oral Gel 15 Gram(s) Oral once PRN Blood Glucose LESS THAN 70 milliGRAM(s)/deciliter  melatonin 3 milliGRAM(s) Oral at bedtime PRN Insomnia  ondansetron Injectable 4 milliGRAM(s) IV Push every 8 hours PRN Nausea and/or Vomiting    CAPILLARY BLOOD GLUCOSE      POCT Blood Glucose.: 352 mg/dL (06 Oct 2023 12:23)  POCT Blood Glucose.: 289 mg/dL (06 Oct 2023 08:26)  POCT Blood Glucose.: 228 mg/dL (05 Oct 2023 21:44)  POCT Blood Glucose.: 263 mg/dL (05 Oct 2023 17:02)    I&O's Summary      Vital Signs Last 24 Hrs  T(C): 36.7 (06 Oct 2023 06:15), Max: 36.7 (05 Oct 2023 23:20)  T(F): 98 (06 Oct 2023 06:15), Max: 98 (05 Oct 2023 23:20)  HR: 79 (06 Oct 2023 06:15) (79 - 85)  BP: 116/70 (06 Oct 2023 06:15) (116/70 - 146/64)  BP(mean): --  RR: 18 (06 Oct 2023 06:15) (18 - 18)  SpO2: 95% (06 Oct 2023 06:15) (95% - 100%)    Parameters below as of 06 Oct 2023 06:15  Patient On (Oxygen Delivery Method): room air        LABS:                        13.0   6.65  )-----------( 162      ( 06 Oct 2023 06:40 )             39.7     10-06    132<L>  |  99  |  20  ----------------------------<  274<H>  3.7   |  20<L>  |  0.90    Ca    9.1      06 Oct 2023 06:40  Phos  3.7     10-05  Mg     1.60     10-05    TPro  5.9<L>  /  Alb  2.9<L>  /  TBili  0.3  /  DBili  x   /  AST  29  /  ALT  28  /  AlkPhos  65  10-06    PT/INR - ( 04 Oct 2023 19:20 )   PT: 10.7 sec;   INR: 0.95 ratio         PTT - ( 04 Oct 2023 19:20 )  PTT:26.8 sec  CARDIAC MARKERS ( 04 Oct 2023 21:16 )  x     / x     / 49 U/L / x     / 1.8 ng/mL      Urinalysis Basic - ( 06 Oct 2023 06:40 )    Color: x / Appearance: x / SG: x / pH: x  Gluc: 274 mg/dL / Ketone: x  / Bili: x / Urobili: x   Blood: x / Protein: x / Nitrite: x   Leuk Esterase: x / RBC: x / WBC x   Sq Epi: x / Non Sq Epi: x / Bacteria: x        Culture - Urine (collected 05 Oct 2023 00:11)  Source: Clean Catch Clean Catch (Midstream)  Final Report (06 Oct 2023 07:41):    <10,000 CFU/mL Normal Urogenital Ariella    Culture - Blood (collected 04 Oct 2023 21:42)  Source: .Blood Blood-Peripheral  Preliminary Report (06 Oct 2023 01:02):    No growth at 24 hours    Culture - Blood (collected 04 Oct 2023 21:42)  Source: .Blood Blood-Venous  Preliminary Report (06 Oct 2023 01:02):    No growth at 24 hours      RADIOLOGY & ADDITIONAL TESTS:  Results Reviewed: Y  Imaging Personally Reviewed: Y  Electrocardiogram Personally Reviewed: Y    COORDINATION OF CARE:  Care Discussed with Consultants/Other Providers [Y/N]: Y  Prior or Outpatient Records Reviewed [Y/N]: JAMIE

## 2023-10-06 NOTE — PROVIDER CONTACT NOTE (OTHER) - SITUATION
Pt is refusing to have an IV placed in for her UTI treatment, she refuses to get her zosyn and fluids
pt without IV fluids due to being removed per policy. labs not drawn due to being hard stick. RN attempted, charge RN attempted, ANM attempted. pt has been stuck total of 5 times.

## 2023-10-06 NOTE — PROGRESS NOTE ADULT - ASSESSMENT
74 yo F w/ hx HTN, HLD. DM-2, currently being treated for UTI p/w acute metabolic encephalopathy due to UTI and poss hyperosmolar state. mental status improved with initial treatment with IV abx and control of blood sugar.     Physical Exam:  GENERAL: no apparent distress; awake and alert; Oriented x3  CHEST/LUNG: on RA; Clear to auscultation bilaterally; No wheezing; No crackles  HEART: Regular rate and rhythm; S1/S2 wnl; no obvious murmurs  ABDOMEN: Soft, Nontender, Nondistended; Bowel sounds present; left sided CVAT on percussion.   EXTREMITIES:  2+ Peripheral Pulses, No edema  PSYCH: normal affect, calm demeanor

## 2023-10-06 NOTE — PROVIDER CONTACT NOTE (OTHER) - ASSESSMENT
Pt is asymptomatic in no signs of distress or pain
pt without IV fluids due to being removed per policy. labs not drawn due to being hard stick. RN attempted, charge RN attempted, ANM attempted. pt has been stuck total of 5 times.

## 2023-10-06 NOTE — PROVIDER CONTACT NOTE (OTHER) - ACTION/TREATMENT ORDERED:
Pt educated on antibiotics and the importance of getting her dose, ACP made aware and came to the unit to educate patient. PT still refuses to get IV and zosyn

## 2023-10-06 NOTE — PROGRESS NOTE ADULT - ASSESSMENT
This is a 72 y/o F with PMHx of CAD s/p PCI on aspirin, poorly-controlled T2DM on insulin, HTN and HLD who presented to the ED for acute metabolic encephalopathy secondary to UTI vs hyperglycemia. Endocrinology consulted for uncontrolled T2DM w/ A1c 15.7%.     #HHS  Presented with altered mental status, glucose >600, no acidosis, minimal ketonuria, BHB 0.5. Received 20 units of lantus, 12 units lispro and 2L NS IVF in the ED.   - per daughter, mental status appears improved and near baseline    #Poorly controlled T2DM   A1c 15.7%, increased from 14% in 6/23. Home regimen: lantus 25 units at bedtime, januvia 100mg daily and metformin 1000mg BID.   - increase lantus *** at bedtime + admelog *** premeals   - recommend moderate correction scale before meals and at bedtime  - continue consistent carbs diet   - discussed target a1c <7%  - discussed risk for micro/macrovascular complications with prolonged elevated a1c and prolonged hyperglycemia   - Discharge plan: Given elevated A1c 15.7%, patient likely needs to go home on basal/bolus insulin, dosing TBD. Will need to discuss with daughter if there will be any barriers/difficulties with administering pre-meal insulin. Patient may need extra assistance either through daughter of home health aid. Patient would also likely benefit from GLP1 for weight loss or possibly a SGLT2 inhibitor given cardiac history (will follow-up on TTE). Patient unlikely to benefit from continued metformin, thus can discontinue. She would also benefit from a CGM.   - can check c-peptide when blood sugars further improve   - will need close follow up with endocrinology outpatient, emailed to schedule appointment at [place] [facility]     #HTN   BP between 116/70 - 146/64  - can consider restarting home BP medications     #HLD  Total cholesterol 127, Triglycerides 198, HDL 41, LDL 46   - consider restarting high intensity atorvastatin equivalent to home pravastatin 40mg daily     NOTE INCOMPLETE/ IN PROGRESS  *Please wait for attending attestation for official recommendations.  This is a 74 y/o F with PMHx of CAD s/p PCI on aspirin, poorly-controlled T2DM on insulin, HTN and HLD who presented to the ED for acute metabolic encephalopathy secondary to UTI vs hyperglycemia. Endocrinology consulted for uncontrolled T2DM w/ A1c 15.7%.     #HHS  Presented with altered mental status, glucose >600, no acidosis, minimal ketonuria, BHB 0.5. Received 20 units of lantus, 12 units lispro and 2L NS IVF in the ED.   - per daughter, mental status appears improved and near baseline    #Poorly controlled T2DM   A1c 15.7%, increased from 14% in 6/23. Home regimen: lantus 25 units at bedtime, januvia 100mg daily and metformin 1000mg BID.   - increase lantus to 30 units at bedtime + admelog 11 units premeal   - recommend moderate correction scale before meals and at bedtime  - continue consistent carbs diet   - discussed target a1c <7%  - discussed risk for micro/macrovascular complications with prolonged elevated a1c and prolonged hyperglycemia   - Discharge plan: Given elevated A1c 15.7%, patient likely needs to go home on basal/bolus insulin, dosing TBD. Will need to discuss with daughter if there will be any barriers/difficulties with administering pre-meal insulin. Patient may need extra assistance either through daughter of home health aid. Patient would also likely benefit from GLP1 for weight loss or possibly a SGLT2 inhibitor given cardiac history (will follow-up on TTE). Patient unlikely to benefit from continued metformin, thus can discontinue. She would also benefit from a CGM.   - can check c-peptide when blood sugars further improve   - will need close follow up with endocrinology outpatient, emailed to schedule appointment at [facility]     #HTN   BP between 116/70 - 146/64  - can consider restarting home BP medications     #HLD  Total cholesterol 127, Triglycerides 198, HDL 41, LDL 46   - consider restarting high intensity atorvastatin equivalent to home pravastatin 40mg daily     NOTE INCOMPLETE/ IN PROGRESS  *Please wait for attending attestation for official recommendations.  This is a 74 y/o F with PMHx of CAD s/p PCI on aspirin, poorly-controlled T2DM on insulin, HTN and HLD who presented to the ED for acute metabolic encephalopathy secondary to UTI vs hyperglycemia. Endocrinology consulted for uncontrolled T2DM w/ A1c 15.7%.     #HHS  Presented with altered mental status, glucose >600, no acidosis, minimal ketonuria, BHB 0.5. Received 20 units of lantus, 12 units lispro and 2L NS IVF in the ED.   - per daughter, mental status appears improved and near baseline    #Poorly controlled T2DM   A1c 15.7%, increased from 14% in 6/23. Home regimen: lantus 25 units at bedtime, januvia 100mg daily and metformin 1000mg BID.   - increase lantus to 30 units at bedtime + admelog 11 units premeal   - recommend moderate correction scale before meals and at bedtime  - continue consistent carbs diet   - discussed target a1c <7%  - discussed risk for micro/macrovascular complications with prolonged elevated a1c and prolonged hyperglycemia   - Discharge plan: Given elevated A1c 15.7%, patient likely needs to go home on basal/bolus insulin, dosing TBD. Will need to discuss with daughter if there will be any barriers/difficulties with administering pre-meal insulin. Patient may need extra assistance either through daughter of home health aid. Patient would also likely benefit from GLP1 for weight loss or possibly a SGLT2 inhibitor given cardiac history (will follow-up on TTE). Patient unlikely to benefit from continued metformin, thus can discontinue. She would also benefit from a CGM.   - can check c-peptide when blood sugars further improve   - will need close follow up with endocrinology outpatient. Patient scheduled for an appointment with nurse practitioner at [facility] on 11/21 @ 3PM. Also scheduled to see Dr. Day at [facility] on 2/12/2024 @ 2:20PM.     #HTN   BP between 116/70 - 146/64  - can consider restarting home BP medications     #HLD  Total cholesterol 127, Triglycerides 198, HDL 41, LDL 46   - consider restarting high intensity atorvastatin equivalent to home pravastatin 40mg daily     NOTE INCOMPLETE/ IN PROGRESS  *Please wait for attending attestation for official recommendations.  This is a 72 y/o F with PMHx of CAD s/p PCI on aspirin, poorly-controlled T2DM on insulin, HTN and HLD who presented to the ED for acute metabolic encephalopathy secondary to UTI vs hyperglycemia. Endocrinology consulted for uncontrolled T2DM w/ A1c 15.7%.     #HHS  Presented with altered mental status, glucose >600, no acidosis, minimal ketonuria, BHB 0.5. Received 20 units of lantus, 12 units lispro and 2L NS IVF in the ED.   - per daughter, mental status appears improved and near baseline    #Poorly controlled T2DM   A1c 15.7%, increased from 14% in 6/23. Home regimen: lantus 25 units at bedtime, januvia 100mg daily and metformin 1000mg BID.   - increase lantus to 30 units at bedtime + admelog 11 units premeal   - recommend moderate correction scale before meals and at bedtime  - continue consistent carbs diet   - discussed target a1c <7%  - discussed risk for micro/macrovascular complications with prolonged elevated a1c and prolonged hyperglycemia   - Discharge plan: Given elevated A1c 15.7%, patient likely needs to go home on basal/bolus insulin, dosing TBD. Will need to discuss with daughter if there will be any barriers/difficulties with administering pre-meal insulin. Patient may need extra assistance either through daughter of home health aid. Patient would also likely benefit from GLP1 for weight loss or possibly a SGLT2 inhibitor given cardiac history (will follow-up on TTE). Patient unlikely to benefit from continued metformin, thus can discontinue. She would also benefit from a CGM.   - can check c-peptide when blood sugars further improve   - will need close follow up with endocrinology outpatient. Patient scheduled for an appointment with nurse practitioner at [facility] [facility] on 11/21 @ 3PM. Also scheduled to see Dr. Day at [address] [facility] on 2/12/2024 @ 2:20PM.     #HTN   BP between 116/70 - 146/64  - can consider restarting home BP medications     #HLD  Total cholesterol 127, Triglycerides 198, HDL 41, LDL 46   - consider restarting high intensity atorvastatin equivalent to home pravastatin 40mg daily

## 2023-10-07 LAB
ALBUMIN SERPL ELPH-MCNC: 3.2 G/DL — LOW (ref 3.3–5)
ALP SERPL-CCNC: 74 U/L — SIGNIFICANT CHANGE UP (ref 40–120)
ALT FLD-CCNC: 32 U/L — SIGNIFICANT CHANGE UP (ref 4–33)
ANION GAP SERPL CALC-SCNC: 16 MMOL/L — HIGH (ref 7–14)
AST SERPL-CCNC: 29 U/L — SIGNIFICANT CHANGE UP (ref 4–32)
BASOPHILS # BLD AUTO: 0.05 K/UL — SIGNIFICANT CHANGE UP (ref 0–0.2)
BASOPHILS NFR BLD AUTO: 0.8 % — SIGNIFICANT CHANGE UP (ref 0–2)
BILIRUB SERPL-MCNC: 0.3 MG/DL — SIGNIFICANT CHANGE UP (ref 0.2–1.2)
BUN SERPL-MCNC: 16 MG/DL — SIGNIFICANT CHANGE UP (ref 7–23)
CALCIUM SERPL-MCNC: 9 MG/DL — SIGNIFICANT CHANGE UP (ref 8.4–10.5)
CHLORIDE SERPL-SCNC: 98 MMOL/L — SIGNIFICANT CHANGE UP (ref 98–107)
CO2 SERPL-SCNC: 22 MMOL/L — SIGNIFICANT CHANGE UP (ref 22–31)
CREAT SERPL-MCNC: 0.73 MG/DL — SIGNIFICANT CHANGE UP (ref 0.5–1.3)
EGFR: 87 ML/MIN/1.73M2 — SIGNIFICANT CHANGE UP
EOSINOPHIL # BLD AUTO: 0.23 K/UL — SIGNIFICANT CHANGE UP (ref 0–0.5)
EOSINOPHIL NFR BLD AUTO: 3.7 % — SIGNIFICANT CHANGE UP (ref 0–6)
GLUCOSE BLDC GLUCOMTR-MCNC: 128 MG/DL — HIGH (ref 70–99)
GLUCOSE BLDC GLUCOMTR-MCNC: 197 MG/DL — HIGH (ref 70–99)
GLUCOSE BLDC GLUCOMTR-MCNC: 206 MG/DL — HIGH (ref 70–99)
GLUCOSE BLDC GLUCOMTR-MCNC: 258 MG/DL — HIGH (ref 70–99)
GLUCOSE BLDC GLUCOMTR-MCNC: 293 MG/DL — HIGH (ref 70–99)
GLUCOSE SERPL-MCNC: 228 MG/DL — HIGH (ref 70–99)
HCT VFR BLD CALC: 41.5 % — SIGNIFICANT CHANGE UP (ref 34.5–45)
HGB BLD-MCNC: 13.8 G/DL — SIGNIFICANT CHANGE UP (ref 11.5–15.5)
IANC: 3.25 K/UL — SIGNIFICANT CHANGE UP (ref 1.8–7.4)
IMM GRANULOCYTES NFR BLD AUTO: 0.3 % — SIGNIFICANT CHANGE UP (ref 0–0.9)
LYMPHOCYTES # BLD AUTO: 2.1 K/UL — SIGNIFICANT CHANGE UP (ref 1–3.3)
LYMPHOCYTES # BLD AUTO: 33.5 % — SIGNIFICANT CHANGE UP (ref 13–44)
MAGNESIUM SERPL-MCNC: 1.6 MG/DL — SIGNIFICANT CHANGE UP (ref 1.6–2.6)
MCHC RBC-ENTMCNC: 28.3 PG — SIGNIFICANT CHANGE UP (ref 27–34)
MCHC RBC-ENTMCNC: 33.3 GM/DL — SIGNIFICANT CHANGE UP (ref 32–36)
MCV RBC AUTO: 85 FL — SIGNIFICANT CHANGE UP (ref 80–100)
MONOCYTES # BLD AUTO: 0.61 K/UL — SIGNIFICANT CHANGE UP (ref 0–0.9)
MONOCYTES NFR BLD AUTO: 9.7 % — SIGNIFICANT CHANGE UP (ref 2–14)
NEUTROPHILS # BLD AUTO: 3.25 K/UL — SIGNIFICANT CHANGE UP (ref 1.8–7.4)
NEUTROPHILS NFR BLD AUTO: 52 % — SIGNIFICANT CHANGE UP (ref 43–77)
NRBC # BLD: 0 /100 WBCS — SIGNIFICANT CHANGE UP (ref 0–0)
NRBC # FLD: 0 K/UL — SIGNIFICANT CHANGE UP (ref 0–0)
PHOSPHATE SERPL-MCNC: 3.2 MG/DL — SIGNIFICANT CHANGE UP (ref 2.5–4.5)
PLATELET # BLD AUTO: 188 K/UL — SIGNIFICANT CHANGE UP (ref 150–400)
POTASSIUM SERPL-MCNC: 3.6 MMOL/L — SIGNIFICANT CHANGE UP (ref 3.5–5.3)
POTASSIUM SERPL-SCNC: 3.6 MMOL/L — SIGNIFICANT CHANGE UP (ref 3.5–5.3)
PROT SERPL-MCNC: 6.4 G/DL — SIGNIFICANT CHANGE UP (ref 6–8.3)
RBC # BLD: 4.88 M/UL — SIGNIFICANT CHANGE UP (ref 3.8–5.2)
RBC # FLD: 13.6 % — SIGNIFICANT CHANGE UP (ref 10.3–14.5)
SODIUM SERPL-SCNC: 136 MMOL/L — SIGNIFICANT CHANGE UP (ref 135–145)
WBC # BLD: 6.26 K/UL — SIGNIFICANT CHANGE UP (ref 3.8–10.5)
WBC # FLD AUTO: 6.26 K/UL — SIGNIFICANT CHANGE UP (ref 3.8–10.5)

## 2023-10-07 PROCEDURE — 99233 SBSQ HOSP IP/OBS HIGH 50: CPT

## 2023-10-07 RX ORDER — SEMAGLUTIDE 0.68 MG/ML
0.25 INJECTION, SOLUTION SUBCUTANEOUS
Qty: 4 | Refills: 0
Start: 2023-10-07

## 2023-10-07 RX ORDER — CIPROFLOXACIN LACTATE 400MG/40ML
500 VIAL (ML) INTRAVENOUS EVERY 12 HOURS
Refills: 0 | Status: DISCONTINUED | OUTPATIENT
Start: 2023-10-07 | End: 2023-10-08

## 2023-10-07 RX ADMIN — Medication 650 MILLIGRAM(S): at 16:28

## 2023-10-07 RX ADMIN — Medication 2: at 13:01

## 2023-10-07 RX ADMIN — Medication 650 MILLIGRAM(S): at 02:05

## 2023-10-07 RX ADMIN — Medication 11 UNIT(S): at 13:01

## 2023-10-07 RX ADMIN — Medication 11 UNIT(S): at 09:37

## 2023-10-07 RX ADMIN — Medication 650 MILLIGRAM(S): at 15:28

## 2023-10-07 RX ADMIN — Medication 500 MILLIGRAM(S): at 13:01

## 2023-10-07 RX ADMIN — Medication 4: at 09:37

## 2023-10-07 RX ADMIN — Medication 81 MILLIGRAM(S): at 13:01

## 2023-10-07 RX ADMIN — Medication 500 MILLIGRAM(S): at 23:05

## 2023-10-07 RX ADMIN — INSULIN GLARGINE 30 UNIT(S): 100 INJECTION, SOLUTION SUBCUTANEOUS at 22:14

## 2023-10-07 RX ADMIN — Medication 2: at 22:13

## 2023-10-07 NOTE — PHYSICAL THERAPY INITIAL EVALUATION ADULT - ADDITIONAL COMMENTS
Pt lives with her daughter in a home 6 steps to enter, no falls, ambulates with a cane once outside the home, completes ADLs alone.

## 2023-10-07 NOTE — DIETITIAN INITIAL EVALUATION ADULT - PERTINENT LABORATORY DATA
10-07    136  |  98  |  16  ----------------------------<  228<H>  3.6   |  22  |  0.73    Ca    9.0      07 Oct 2023 08:13  Phos  3.2     10-07  Mg     1.60     10-07    TPro  6.4  /  Alb  3.2<L>  /  TBili  0.3  /  DBili  x   /  AST  29  /  ALT  32  /  AlkPhos  74  10-07  POCT Blood Glucose.: 206 mg/dL (10-07-23 @ 09:00)  A1C with Estimated Average Glucose Result: 15.7 % (10-04-23 @ 19:20)  A1C with Estimated Average Glucose Result: 14.0 % (06-06-23 @ 07:26)  A1C with Estimated Average Glucose Result: 13.7 % (06-05-23 @ 05:47)

## 2023-10-07 NOTE — DIETITIAN INITIAL EVALUATION ADULT - PROBLEM SELECTOR PLAN 2
A1C- 15.7; FS on admission 600; concern for hyperosmolar state   Home regimen metformin 1g BID; Januvia 100mg qd: basglar 25 U sq qhs    -  VBG 7.42/45/26/39.3; mildly elevated B-OH 0.5  - normal AG gap   - s/p 2L NS in ED   - NS 75 ml/hr  - check serum Osm; repeat BoH   - Endo c/s   - c/w lantus 18 U

## 2023-10-07 NOTE — PHYSICAL THERAPY INITIAL EVALUATION ADULT - NSPTDISCHREC_GEN_A_CORE
Home PT to address dynamic balance impairments within the home.  Pt encouraged to use cane once in home.  Pt operating close to functional baseline.

## 2023-10-07 NOTE — PROGRESS NOTE ADULT - SUBJECTIVE AND OBJECTIVE BOX
[facility] Division of Hospital Medicine  Nikunj Brandon MD  In House Pager [phone]    Patient is a 73y old  Female who presents with a chief complaint of Altered mental status     (07 Oct 2023 11:27)    SUBJECTIVE / OVERNIGHT EVENTS: no acute events. lost IV access overnight.     ROS: Denied Fever, Chill, CP, SOB, Abd pain, N/V/D, LE swelling or pain.     MEDICATIONS  (STANDING):  aspirin enteric coated 81 milliGRAM(s) Oral daily  ciprofloxacin     Tablet 500 milliGRAM(s) Oral every 12 hours  dextrose 5%. 1000 milliLiter(s) (50 mL/Hr) IV Continuous <Continuous>  dextrose 5%. 1000 milliLiter(s) (100 mL/Hr) IV Continuous <Continuous>  dextrose 50% Injectable 12.5 Gram(s) IV Push once  dextrose 50% Injectable 25 Gram(s) IV Push once  dextrose 50% Injectable 25 Gram(s) IV Push once  glucagon  Injectable 1 milliGRAM(s) IntraMuscular once  influenza  Vaccine (HIGH DOSE) 0.7 milliLiter(s) IntraMuscular once  insulin glargine Injectable (LANTUS) 30 Unit(s) SubCutaneous at bedtime  insulin lispro (ADMELOG) corrective regimen sliding scale   SubCutaneous at bedtime  insulin lispro (ADMELOG) corrective regimen sliding scale   SubCutaneous three times a day before meals  insulin lispro Injectable (ADMELOG) 11 Unit(s) SubCutaneous three times a day before meals    MEDICATIONS  (PRN):  acetaminophen     Tablet .. 650 milliGRAM(s) Oral every 6 hours PRN Temp greater or equal to 38C (100.4F), Mild Pain (1 - 3)  aluminum hydroxide/magnesium hydroxide/simethicone Suspension 30 milliLiter(s) Oral every 4 hours PRN Dyspepsia  dextrose Oral Gel 15 Gram(s) Oral once PRN Blood Glucose LESS THAN 70 milliGRAM(s)/deciliter  melatonin 3 milliGRAM(s) Oral at bedtime PRN Insomnia  ondansetron Injectable 4 milliGRAM(s) IV Push every 8 hours PRN Nausea and/or Vomiting    CAPILLARY BLOOD GLUCOSE      POCT Blood Glucose.: 197 mg/dL (07 Oct 2023 12:12)  POCT Blood Glucose.: 206 mg/dL (07 Oct 2023 09:00)  POCT Blood Glucose.: 187 mg/dL (06 Oct 2023 22:21)  POCT Blood Glucose.: 157 mg/dL (06 Oct 2023 17:02)    I&O's Summary    07 Oct 2023 07:01  -  07 Oct 2023 15:42  --------------------------------------------------------  IN: 0 mL / OUT: 1000 mL / NET: -1000 mL        Vital Signs Last 24 Hrs  T(C): 36.6 (07 Oct 2023 06:23), Max: 36.8 (06 Oct 2023 22:20)  T(F): 97.9 (07 Oct 2023 06:23), Max: 98.2 (06 Oct 2023 22:20)  HR: 81 (07 Oct 2023 06:23) (81 - 84)  BP: 139/77 (07 Oct 2023 06:23) (139/77 - 157/72)  BP(mean): --  RR: 18 (07 Oct 2023 06:23) (18 - 19)  SpO2: 98% (07 Oct 2023 06:23) (98% - 99%)    Parameters below as of 07 Oct 2023 06:23  Patient On (Oxygen Delivery Method): room air        LABS:                        13.8   6.26  )-----------( 188      ( 07 Oct 2023 08:13 )             41.5     10-07    136  |  98  |  16  ----------------------------<  228<H>  3.6   |  22  |  0.73    Ca    9.0      07 Oct 2023 08:13  Phos  3.2     10-07  Mg     1.60     10-07    TPro  6.4  /  Alb  3.2<L>  /  TBili  0.3  /  DBili  x   /  AST  29  /  ALT  32  /  AlkPhos  74  10-07          Urinalysis Basic - ( 07 Oct 2023 08:13 )    Color: x / Appearance: x / SG: x / pH: x  Gluc: 228 mg/dL / Ketone: x  / Bili: x / Urobili: x   Blood: x / Protein: x / Nitrite: x   Leuk Esterase: x / RBC: x / WBC x   Sq Epi: x / Non Sq Epi: x / Bacteria: x        Culture - Urine (collected 05 Oct 2023 00:11)  Source: Clean Catch Clean Catch (Midstream)  Final Report (06 Oct 2023 07:41):    <10,000 CFU/mL Normal Urogenital Ariella    Culture - Blood (collected 04 Oct 2023 21:42)  Source: .Blood Blood-Venous  Preliminary Report (07 Oct 2023 01:02):    No growth at 48 Hours    Culture - Blood (collected 04 Oct 2023 21:42)  Source: .Blood Blood-Peripheral  Preliminary Report (07 Oct 2023 01:02):    No growth at 48 Hours      RADIOLOGY & ADDITIONAL TESTS:  Results Reviewed: Y  Imaging Personally Reviewed: Y  Electrocardiogram Personally Reviewed: Y    COORDINATION OF CARE:  Care Discussed with Consultants/Other Providers [Y/N]: Y  Prior or Outpatient Records Reviewed [Y/N]: Y

## 2023-10-07 NOTE — PROGRESS NOTE ADULT - PROBLEM SELECTOR PLAN 1
due to sepsis and hyperglycemia.   mental status improved with treatment.   plan as below  monitor clinically.
due to sepsis and hyperglycemia.   mental status improved with treatment.   plan as below  monitor clinically.

## 2023-10-07 NOTE — PROGRESS NOTE ADULT - PROBLEM SELECTOR PROBLEM 1
Uncontrolled type 2 diabetes mellitus with hyperosmolar coma
Acute metabolic encephalopathy
Acute metabolic encephalopathy

## 2023-10-07 NOTE — PROGRESS NOTE ADULT - ASSESSMENT
72 yo F w/ hx HTN, HLD. DM-2, currently being treated for UTI p/w acute metabolic encephalopathy due to UTI and poss hyperosmolar state. mental status improved with initial treatment with IV abx and control of blood sugar. uncontrolled DM, improved with basal/bolus inuslin.     Physical Exam:  GENERAL: no apparent distress; awake and alert; Oriented x3  CHEST/LUNG: on RA; Clear to auscultation bilaterally; No wheezing; No crackles  HEART: Regular rate and rhythm; S1/S2 wnl; no obvious murmurs  ABDOMEN: Soft, Nontender, Nondistended; Bowel sounds present; left sided CVAT on percussion.   EXTREMITIES:  2+ Peripheral Pulses, No edema  PSYCH: normal affect, calm demeanor

## 2023-10-07 NOTE — PROGRESS NOTE ADULT - PROBLEM SELECTOR PLAN 4
Elevated troponin  - EKG non ischemic   - CPK 49 and trop 106—100  - Cards appreciated suspect demand ischemia   - tele monitor, can d/c if no events  - echo pending, clinically euvolemic, no signs of HF.
Elevated troponin  - EKG non ischemic   - CPK 49 and trop 106—100  - Cards appreciated suspect demand ischemia   - tele monitor, can d/c if no events  - echo pending, clinically euvolemic, no signs of HF.

## 2023-10-07 NOTE — PROGRESS NOTE ADULT - PROBLEM SELECTOR PLAN 2
sepsis on presentation. (fever, tachycardia, leukocytosis)  clinically with pyelonephritis (left CVAT)  Urine sterile likely due to outpatient Macrobid use.  on Zosyn 10/5 - 10/6  c/w Cipro 500mg bid. 10/7 -
sepsis on presentation. (fever, tachycardia, leukocytosis)  clinically with pyelonephritis (left CVAT)  Urine sterile likely due to outpatient Macrobid use.  c/w Zosyn at this time, follow up Bcx.

## 2023-10-07 NOTE — PHYSICAL THERAPY INITIAL EVALUATION ADULT - PERTINENT HX OF CURRENT PROBLEM, REHAB EVAL
72 yo F w/ Hx DM on insulin as per notes administers own insulin, currently being treated for UTI p/w confusion/AMS. As per daughter pt was in usual state of health on 10/4 AM when daughter went to work and when she returned she was not answering questions noted to be unsteady. Daughter called EMS and fingerstick noted to be elevated. On arrival concern for stroke canceled given elevated FS in 600's

## 2023-10-07 NOTE — PROGRESS NOTE ADULT - PROBLEM SELECTOR PLAN 3
A1C- 15.7; FS on admission 600; concern for hyperosmolar state   Home regimen metformin 1g BID; Januvia 100mg qd: basglar 25 U sq qhs    - suspected HHS on presentation, mental status improved.   - c/w basal bolus insulin.   - f/u endo recs for discharge regimen. patient will not be able to perform QID insulin.
A1C- 15.7; FS on admission 600; concern for hyperosmolar state   Home regimen metformin 1g BID; Januvia 100mg qd: basglar 25 U sq qhs    - suspected HHS on presentation, mental status improved.   - c/w basal bolus insulin.   - f/u endo recs.

## 2023-10-07 NOTE — CHART NOTE - NSCHARTNOTEFT_GEN_A_CORE
This is a 74 y/o F with PMHx of CAD s/p PCI on aspirin, poorly-controlled T2DM on insulin, HTN and HLD who presented to the ED for acute metabolic encephalopathy secondary to UTI vs hyperglycemia. Endocrinology consulted for uncontrolled T2DM w/ A1c 15.7%.     CAPILLARY BLOOD GLUCOSE    POCT Blood Glucose.: 197 mg/dL (07 Oct 2023 12:12)  POCT Blood Glucose.: 206 mg/dL (07 Oct 2023 09:00)  POCT Blood Glucose.: 187 mg/dL (06 Oct 2023 22:21)  POCT Blood Glucose.: 157 mg/dL (06 Oct 2023 17:02)      #HHS  Presented with altered mental status, glucose >600, no acidosis, minimal ketonuria, BHB 0.5. Received 20 units of lantus, 12 units lispro and 2L NS IVF in the ED.   - per daughter, mental status appears improved and near baseline    #Poorly controlled T2DM   A1c 15.7%, increased from 14% in 6/23. Home regimen: lantus 25 units at bedtime, januvia 100mg daily and metformin 1000mg BID.   - increase lantus to 33 units at bedtime + continue admelog 11 units premeal   - recommend moderate correction scale before meals and at bedtime  - continue consistent carbs diet   - discussed target a1c <7%  - discussed risk for micro/macrovascular complications with prolonged elevated a1c and prolonged hyperglycemia   - Discharge plan: Given elevated A1c 15.7%, patient likely needs to go home on basal/bolus insulin, dosing TBD. Will need to discuss with daughter if there will be any barriers/difficulties with administering pre-meal insulin. Patient may need extra assistance either through daughter of home health aid. Patient would also likely benefit from GLP1 for weight loss or possibly a SGLT2 inhibitor given cardiac history (will follow-up on TTE). Patient unlikely to benefit from continued metformin, thus can discontinue. She would also benefit from a CGM.   - can check c-peptide when blood sugars further improve   - will need close follow up with endocrinology outpatient. Patient scheduled for an appointment with nurse practitioner at [address] [facility] on 11/21 @ 3PM. Also scheduled to see Dr. Day at [address] [facility] on 2/12/2024 @ 2:20PM.     Roselyn Regalado  Nurse Practitioner  Division of Endocrinology & Diabetes  Available via  Teams      If after 6PM or before 9AM, or on weekends/holidays, please call endocrine answering service for assistance ([phone]).  For nonurgent matters email [email or web address] for assistance.

## 2023-10-07 NOTE — PROGRESS NOTE ADULT - PROBLEM SELECTOR PLAN 5
- home med norvasc 10 mg qd / unclear if on HCTZ 12.5 mg qd   - BP meds on hold in setting of sepsis
- home med norvasc 10 mg qd / unclear if on HCTZ 12.5 mg qd   - BP meds on hold in setting of sepsis

## 2023-10-07 NOTE — DIETITIAN INITIAL EVALUATION ADULT - PERTINENT MEDS FT
MEDICATIONS  (STANDING):  aspirin enteric coated 81 milliGRAM(s) Oral daily  ciprofloxacin     Tablet 500 milliGRAM(s) Oral every 12 hours  dextrose 5%. 1000 milliLiter(s) (50 mL/Hr) IV Continuous <Continuous>  dextrose 5%. 1000 milliLiter(s) (100 mL/Hr) IV Continuous <Continuous>  dextrose 50% Injectable 12.5 Gram(s) IV Push once  dextrose 50% Injectable 25 Gram(s) IV Push once  dextrose 50% Injectable 25 Gram(s) IV Push once  glucagon  Injectable 1 milliGRAM(s) IntraMuscular once  influenza  Vaccine (HIGH DOSE) 0.7 milliLiter(s) IntraMuscular once  insulin glargine Injectable (LANTUS) 30 Unit(s) SubCutaneous at bedtime  insulin lispro (ADMELOG) corrective regimen sliding scale   SubCutaneous at bedtime  insulin lispro (ADMELOG) corrective regimen sliding scale   SubCutaneous three times a day before meals  insulin lispro Injectable (ADMELOG) 11 Unit(s) SubCutaneous three times a day before meals    MEDICATIONS  (PRN):  acetaminophen     Tablet .. 650 milliGRAM(s) Oral every 6 hours PRN Temp greater or equal to 38C (100.4F), Mild Pain (1 - 3)  aluminum hydroxide/magnesium hydroxide/simethicone Suspension 30 milliLiter(s) Oral every 4 hours PRN Dyspepsia  dextrose Oral Gel 15 Gram(s) Oral once PRN Blood Glucose LESS THAN 70 milliGRAM(s)/deciliter  melatonin 3 milliGRAM(s) Oral at bedtime PRN Insomnia  ondansetron Injectable 4 milliGRAM(s) IV Push every 8 hours PRN Nausea and/or Vomiting

## 2023-10-07 NOTE — PROGRESS NOTE ADULT - PROBLEM SELECTOR PLAN 8
DVT: Lovenox  Diet: Regular/DASH/DM  Dispo: PT irma.
DVT: Lovenox  Diet: Regular/DASH/DM  Dispo: PT irma.

## 2023-10-07 NOTE — DIETITIAN INITIAL EVALUATION ADULT - ORAL INTAKE PTA/DIET HISTORY
Patient seen for assessment. Reports overall good appetite PTA. Lives w/ daughter. Per diet recall, for breakfast consumes cereal w/ coffee, skips lunch, dinner is meat, starch and vegetables. Doesn't snack and consumes diet beverages. States checking blood sugars, but couldn't provide usual readings. A1c is 15.7%.

## 2023-10-07 NOTE — DIETITIAN INITIAL EVALUATION ADULT - OTHER INFO
73 year old female with a PMH of HTN, HLD, DM2, currently being treated for UTI p/w acute metabolic encephalopathy due to UTI and possible hyperosmolar state per chart.    Patient reporting good appetite and consumed breakfast per observation. Reports some nausea, on IV antibiotics. No chewing/swallowing difficulties. Has no food allergies. Patient unsure of UBW. Per HIE, noted w/ weight 111.8 kg (6/5). ABW is 109.8 kg (10/5) per chart showing stable weight x 4 months. No edema or pressure injuries per RN flow sheet.    Provided pt with handout Carbohydrate Counting for People with Diabetes. Discussed carbohydrate sources, carbohydrate portions, protein sources, mixed meals, and nutrition label reading. Stressed importance of balanced meals with adequate protein and fiber. Pt was informed of current A1c, goal A1c, and goal fingerstick range.

## 2023-10-08 ENCOUNTER — TRANSCRIPTION ENCOUNTER (OUTPATIENT)
Age: 73
End: 2023-10-08

## 2023-10-08 VITALS
RESPIRATION RATE: 18 BRPM | HEART RATE: 73 BPM | SYSTOLIC BLOOD PRESSURE: 145 MMHG | TEMPERATURE: 98 F | OXYGEN SATURATION: 99 % | DIASTOLIC BLOOD PRESSURE: 67 MMHG

## 2023-10-08 LAB
ALBUMIN SERPL ELPH-MCNC: 3.2 G/DL — LOW (ref 3.3–5)
ALP SERPL-CCNC: 75 U/L — SIGNIFICANT CHANGE UP (ref 40–120)
ALT FLD-CCNC: 28 U/L — SIGNIFICANT CHANGE UP (ref 4–33)
ANION GAP SERPL CALC-SCNC: 11 MMOL/L — SIGNIFICANT CHANGE UP (ref 7–14)
AST SERPL-CCNC: 21 U/L — SIGNIFICANT CHANGE UP (ref 4–32)
BASOPHILS # BLD AUTO: 0.05 K/UL — SIGNIFICANT CHANGE UP (ref 0–0.2)
BASOPHILS NFR BLD AUTO: 0.7 % — SIGNIFICANT CHANGE UP (ref 0–2)
BILIRUB SERPL-MCNC: 0.3 MG/DL — SIGNIFICANT CHANGE UP (ref 0.2–1.2)
BUN SERPL-MCNC: 19 MG/DL — SIGNIFICANT CHANGE UP (ref 7–23)
CALCIUM SERPL-MCNC: 8.9 MG/DL — SIGNIFICANT CHANGE UP (ref 8.4–10.5)
CHLORIDE SERPL-SCNC: 101 MMOL/L — SIGNIFICANT CHANGE UP (ref 98–107)
CO2 SERPL-SCNC: 24 MMOL/L — SIGNIFICANT CHANGE UP (ref 22–31)
CREAT SERPL-MCNC: 0.85 MG/DL — SIGNIFICANT CHANGE UP (ref 0.5–1.3)
EGFR: 72 ML/MIN/1.73M2 — SIGNIFICANT CHANGE UP
EOSINOPHIL # BLD AUTO: 0.24 K/UL — SIGNIFICANT CHANGE UP (ref 0–0.5)
EOSINOPHIL NFR BLD AUTO: 3.2 % — SIGNIFICANT CHANGE UP (ref 0–6)
GLUCOSE BLDC GLUCOMTR-MCNC: 179 MG/DL — HIGH (ref 70–99)
GLUCOSE BLDC GLUCOMTR-MCNC: 230 MG/DL — HIGH (ref 70–99)
GLUCOSE SERPL-MCNC: 202 MG/DL — HIGH (ref 70–99)
HCT VFR BLD CALC: 38.8 % — SIGNIFICANT CHANGE UP (ref 34.5–45)
HGB BLD-MCNC: 13.2 G/DL — SIGNIFICANT CHANGE UP (ref 11.5–15.5)
IANC: 3.91 K/UL — SIGNIFICANT CHANGE UP (ref 1.8–7.4)
IMM GRANULOCYTES NFR BLD AUTO: 0.3 % — SIGNIFICANT CHANGE UP (ref 0–0.9)
LYMPHOCYTES # BLD AUTO: 2.43 K/UL — SIGNIFICANT CHANGE UP (ref 1–3.3)
LYMPHOCYTES # BLD AUTO: 32.7 % — SIGNIFICANT CHANGE UP (ref 13–44)
MAGNESIUM SERPL-MCNC: 1.6 MG/DL — SIGNIFICANT CHANGE UP (ref 1.6–2.6)
MCHC RBC-ENTMCNC: 28.8 PG — SIGNIFICANT CHANGE UP (ref 27–34)
MCHC RBC-ENTMCNC: 34 GM/DL — SIGNIFICANT CHANGE UP (ref 32–36)
MCV RBC AUTO: 84.7 FL — SIGNIFICANT CHANGE UP (ref 80–100)
MONOCYTES # BLD AUTO: 0.77 K/UL — SIGNIFICANT CHANGE UP (ref 0–0.9)
MONOCYTES NFR BLD AUTO: 10.4 % — SIGNIFICANT CHANGE UP (ref 2–14)
NEUTROPHILS # BLD AUTO: 3.91 K/UL — SIGNIFICANT CHANGE UP (ref 1.8–7.4)
NEUTROPHILS NFR BLD AUTO: 52.7 % — SIGNIFICANT CHANGE UP (ref 43–77)
NRBC # BLD: 0 /100 WBCS — SIGNIFICANT CHANGE UP (ref 0–0)
NRBC # FLD: 0 K/UL — SIGNIFICANT CHANGE UP (ref 0–0)
PHOSPHATE SERPL-MCNC: 3.3 MG/DL — SIGNIFICANT CHANGE UP (ref 2.5–4.5)
PLATELET # BLD AUTO: 210 K/UL — SIGNIFICANT CHANGE UP (ref 150–400)
POTASSIUM SERPL-MCNC: 3.3 MMOL/L — LOW (ref 3.5–5.3)
POTASSIUM SERPL-SCNC: 3.3 MMOL/L — LOW (ref 3.5–5.3)
PROT SERPL-MCNC: 6 G/DL — SIGNIFICANT CHANGE UP (ref 6–8.3)
RBC # BLD: 4.58 M/UL — SIGNIFICANT CHANGE UP (ref 3.8–5.2)
RBC # FLD: 13.7 % — SIGNIFICANT CHANGE UP (ref 10.3–14.5)
SODIUM SERPL-SCNC: 136 MMOL/L — SIGNIFICANT CHANGE UP (ref 135–145)
WBC # BLD: 7.42 K/UL — SIGNIFICANT CHANGE UP (ref 3.8–10.5)
WBC # FLD AUTO: 7.42 K/UL — SIGNIFICANT CHANGE UP (ref 3.8–10.5)

## 2023-10-08 PROCEDURE — 99239 HOSP IP/OBS DSCHRG MGMT >30: CPT

## 2023-10-08 RX ORDER — SITAGLIPTIN 50 MG/1
1 TABLET, FILM COATED ORAL
Refills: 0 | DISCHARGE

## 2023-10-08 RX ORDER — INSULIN GLARGINE 100 [IU]/ML
25 INJECTION, SOLUTION SUBCUTANEOUS
Qty: 0 | Refills: 0 | DISCHARGE

## 2023-10-08 RX ORDER — INSULIN GLARGINE 100 [IU]/ML
33 INJECTION, SOLUTION SUBCUTANEOUS AT BEDTIME
Refills: 0 | Status: DISCONTINUED | OUTPATIENT
Start: 2023-10-08 | End: 2023-10-08

## 2023-10-08 RX ORDER — INSULIN GLARGINE 100 [IU]/ML
33 INJECTION, SOLUTION SUBCUTANEOUS
Qty: 1 | Refills: 0
Start: 2023-10-08 | End: 2023-11-06

## 2023-10-08 RX ORDER — POTASSIUM CHLORIDE 20 MEQ
20 PACKET (EA) ORAL ONCE
Refills: 0 | Status: COMPLETED | OUTPATIENT
Start: 2023-10-08 | End: 2023-10-08

## 2023-10-08 RX ORDER — HYDROCHLOROTHIAZIDE 25 MG
1 TABLET ORAL
Qty: 0 | Refills: 0 | DISCHARGE

## 2023-10-08 RX ORDER — CIPROFLOXACIN LACTATE 400MG/40ML
1 VIAL (ML) INTRAVENOUS
Qty: 8 | Refills: 0
Start: 2023-10-08 | End: 2023-10-11

## 2023-10-08 RX ADMIN — Medication 81 MILLIGRAM(S): at 11:09

## 2023-10-08 RX ADMIN — Medication 11 UNIT(S): at 09:28

## 2023-10-08 RX ADMIN — Medication 2: at 09:27

## 2023-10-08 RX ADMIN — Medication 4: at 12:16

## 2023-10-08 RX ADMIN — Medication 20 MILLIEQUIVALENT(S): at 11:09

## 2023-10-08 RX ADMIN — Medication 11 UNIT(S): at 12:17

## 2023-10-08 RX ADMIN — Medication 500 MILLIGRAM(S): at 05:18

## 2023-10-08 NOTE — DISCHARGE NOTE PROVIDER - HOSPITAL COURSE
74 yo F w/ hx HTN, HLD. DM-2, currently being treated for UTI p/w acute metabolic encephalopathy due to UTI and Hyperglycemia hyperosmolar state. mental status improved with initial treatment with IV abx and control of blood sugar. uncontrolled DM, improved with basal/bolus inuslin. Bcx is negative. Patient empirically treated with zosyn, no transition to Cipro with good response clinically. She would ideally be on basal/bolu insulin for discharge given A1c >15, but patient reports difficult to self administer premeal on her own. Discussed with Endo and their recommendation were followed with daily insulin, Ozempic and Metformin. she will follow up with Endocrine in office. 72 yo F w/ hx HTN, HLD. DM-2, currently being treated for UTI p/w acute metabolic encephalopathy due to UTI and Hyperglycemia hyperosmolar state. mental status improved with initial treatment with IV abx and control of blood sugar. uncontrolled DM, improved with basal/bolus inuslin. Bcx is negative. Patient empirically treated with zosyn, no transition to Cipro with good response clinically. She would ideally be on basal/bolu insulin for discharge given A1c >15, but patient reports difficult to self administer premeal on her own. Discussed with Endo and their recommendation were followed with daily insulin, Ozempic and Metformin. she will follow up with Endocrine in office. She was also noted to have mildly elevated troponin on admision, stable on repeats. EKG w/o signs of acute ischemia. patient denied any CP/SOB during hospitalization. tele w/o events. troponin likely due to stress from infection. follow up with PCP for echo outpatient.

## 2023-10-08 NOTE — DISCHARGE NOTE PROVIDER - CARE PROVIDER_API CALL
[de-identified] : 25 minutes was spent reviewing the x-rays as well as discussing with the patient their clinical presentation, diagnosis and providing education.  The patient has had extensive therapy for her ongoing chronic neck pain without any sustained improvement.  The patient is also had trigger point injections without any improvement.  At this time patient is recommended physiatry follow-up to evaluate the degenerative changes seen at C5-C6.  The patient may benefit from additional therapy.  She will continue her gabapentin.  She will avoid anti-inflammatories because of the past history of pancreatitis.  All questions were answered to the patient's satisfaction. Gale Day  Endocrinology/Metab/Diabetes  [address] [place] [address], [address]  [place] [address]  Phone: [phone]  Fax: [phone]  Follow Up Time:     Adam Salgado  Internal Medicine  [address] [facility] [place] [address]  Phone: [phone]  Fax: [phone]  Follow Up Time:

## 2023-10-08 NOTE — DISCHARGE NOTE PROVIDER - NSDCMRMEDTOKEN_GEN_ALL_CORE_FT
amLODIPine 10 mg oral tablet: 1 tab(s) orally once a day  aspirin 81 mg oral tablet: 1 tab(s) orally once a day  Basaglar KwikPen 100 units/mL subcutaneous solution: 33 unit(s) subcutaneous once a day (at bedtime)  dexlansoprazole 60 mg oral delayed release capsule: 1 orally once a day  DULoxetine 30 mg oral delayed release capsule: 1 cap(s) orally once a day  gabapentin 600 mg oral tablet: 1 tab(s) orally 2 times a day  hydroCHLOROthiazide 12.5 mg oral tablet: 1 tab(s) orally once a day  hydrOXYzine hydrochloride 10 mg oral tablet: 1 orally once a day (at bedtime)  metFORMIN 1000 mg oral tablet: 1 tab(s) orally 2 times a day  Ozempic 2 mg/3 mL (0.25 mg or 0.5 mg dose) subcutaneous solution: 0.25 milligram(s) subcutaneously once a week  pravastatin 40 mg oral tablet: 1 tab(s) orally once a day  tolterodine 4 mg oral capsule, extended release: 1 orally once a day   amLODIPine 10 mg oral tablet: 1 tab(s) orally once a day  aspirin 81 mg oral tablet: 1 tab(s) orally once a day  Basaglar KwikPen 100 units/mL subcutaneous solution: 33 unit(s) subcutaneous once a day (at bedtime)  ciprofloxacin 500 mg oral tablet: 1 tab(s) orally every 12 hours  dexlansoprazole 60 mg oral delayed release capsule: 1 orally once a day  DULoxetine 30 mg oral delayed release capsule: 1 cap(s) orally once a day  gabapentin 600 mg oral tablet: 1 tab(s) orally 2 times a day  hydrOXYzine hydrochloride 10 mg oral tablet: 1 orally once a day (at bedtime)  metFORMIN 1000 mg oral tablet: 1 tab(s) orally 2 times a day  Ozempic 2 mg/3 mL (0.25 mg or 0.5 mg dose) subcutaneous solution: 0.25 milligram(s) subcutaneously once a week  pravastatin 40 mg oral tablet: 1 tab(s) orally once a day  tolterodine 4 mg oral capsule, extended release: 1 orally once a day

## 2023-10-08 NOTE — CHART NOTE - NSCHARTNOTEFT_GEN_A_CORE
This is a 74 y/o F with PMHx of CAD s/p PCI on aspirin, poorly-controlled T2DM on insulin, HTN and HLD who presented to the ED for acute metabolic encephalopathy secondary to UTI vs hyperglycemia. Endocrinology consulted for uncontrolled T2DM w/ A1c 15.7%.       Contacted by primary team.  patient does not want to do QID injections and requests a different plan.  Patient is willing to do once daily injections.        CAPILLARY BLOOD GLUCOSE    POCT Blood Glucose.: 179 mg/dL (08 Oct 2023 09:01)  POCT Blood Glucose.: 258 mg/dL (07 Oct 2023 22:08)  POCT Blood Glucose.: 293 mg/dL (07 Oct 2023 21:52)  POCT Blood Glucose.: 128 mg/dL (07 Oct 2023 17:59)  POCT Blood Glucose.: 197 mg/dL (07 Oct 2023 12:12  POCT Blood Glucose.: 197 mg/dL (07 Oct 2023 12:12)  POCT Blood Glucose.: 206 mg/dL (07 Oct 2023 09:00)  POCT Blood Glucose.: 187 mg/dL (06 Oct 2023 22:21)  POCT Blood Glucose.: 157 mg/dL (06 Oct 2023 17:02)      #HHS  Presented with altered mental status, glucose >600, no acidosis, minimal ketonuria, BHB 0.5. Received 20 units of lantus, 12 units lispro and 2L NS IVF in the ED.   - per daughter, mental status appears improved and near baseline    #Poorly controlled T2DM   A1c 15.7%, increased from 14% in 6/23. Home regimen: lantus 25 units at bedtime, januvia 100mg daily and metformin 1000mg BID.   - Continue lantus to 33 units at bedtime + continue admelog 11 units premeal while inpatient  - recommend moderate correction scale before meals and at bedtime  - continue consistent carbs diet     - Discharge plan: Given elevated A1c 15.7%, Ideally, recommend basal/bolus insulin, however patient is only amenable to once daily insuin injections.   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   Patient would also likely benefit from GLP1 for weight loss. Ozempic 0.25 mg sq q week sent to pharmacy, insurance covers, but has copay $49.99 which patient is willing to pay as per primary medicine team.  Would not recommend SGLT2i given recent UTI.  This can be followed up outpatient  Can continue metformin 1000 mg PO BID  STOP JANUVIA as patient will be dcd on GLP1  She would also benefit from a CGM.   - will need close follow up with endocrinology outpatient. Patient scheduled for an appointment with nurse practitioner at [address] [facility] on 11/21 @ 3PM. Also scheduled to see Dr. Day at [address] [facility] on 2/12/2024 @ 2:20PM.     Roselyn Regalado  Nurse Practitioner  Division of Endocrinology & Diabetes  Available via  Teams      If after 6PM or before 9AM, or on weekends/holidays, please call endocrine answering service for assistance ([phone]).  For nonurgent matters email [email or web address] for assistance.

## 2023-10-08 NOTE — DISCHARGE NOTE NURSING/CASE MANAGEMENT/SOCIAL WORK - NSDCFUADDAPPT_GEN_ALL_CORE_FT
Patient scheduled for an appointment with nurse practitioner at [address] [facility] on 11/21 @ 3PM.   Also scheduled to see Dr. Day at [address] [facility] on 2/12/2024 @ 2:20PM.

## 2023-10-08 NOTE — DISCHARGE NOTE NURSING/CASE MANAGEMENT/SOCIAL WORK - NSDCPEFALRISK_GEN_ALL_CORE
For information on Fall & Injury Prevention, visit: [email or web address] OR  [email or web address] OR  https://www.cdc.gov/steadi/patient.html

## 2023-10-08 NOTE — DISCHARGE NOTE NURSING/CASE MANAGEMENT/SOCIAL WORK - PATIENT PORTAL LINK FT
You can access the FollowMyHealth Patient Portal offered by [facility] by registering at the following website: [email or web address]. By joining [facility]’s FollowMyHealth portal, you will also be able to view your health information using other applications (apps) compatible with our system.

## 2023-10-08 NOTE — DISCHARGE NOTE PROVIDER - NSDCFUADDAPPT_GEN_ALL_CORE_FT
Patient scheduled for an appointment with nurse practitioner at [address] [facility] on 11/21 @ 3PM.   Also scheduled to see Dr. Day at [address] [facility] on 2/12/2024 @ 2:20PM.

## 2023-10-08 NOTE — DISCHARGE NOTE PROVIDER - NSDCFUSCHEDAPPT_GEN_ALL_CORE_FT
Laureen Casas  [facility]  [address]  Scheduled Appointment: 11/21/2023

## 2023-10-08 NOTE — DISCHARGE NOTE PROVIDER - ATTENDING DISCHARGE PHYSICAL EXAMINATION:
Physical Exam:  GENERAL: no apparent distress  CHEST/LUNG: on RA; Clear to auscultation bilaterally; No wheezing; No crackles  HEART: Regular rate and rhythm; S1/S2 wnl; no obvious murmurs  ABDOMEN: Soft, Nontender, Nondistended; Bowel sounds present  EXTREMITIES:  2+ Peripheral Pulses, No edema  PSYCH: normal affect, calm demeanor  NEUROLOGY: AAOX3, CN 2-12 grossly intact, no obvious FND

## 2023-10-08 NOTE — DISCHARGE NOTE PROVIDER - NSDCCPCAREPLAN_GEN_ALL_CORE_FT
PRINCIPAL DISCHARGE DIAGNOSIS  Diagnosis: Altered mental state  Assessment and Plan of Treatment: this was due to infection and very high blood sugar. please continue to monitor for blood sugar at home. complete course of antibiotics as prescribed. follow up with pcp.      SECONDARY DISCHARGE DIAGNOSES  Diagnosis: Diabetes mellitus  Assessment and Plan of Treatment: Continue consistent carbohydrate diet.  Monitor blood glucose levels throughout the day before meals and at bedtime.  Record blood sugars and bring to outpatient providers appointment in order to be reviewed by your doctor for management modifications.  Be aware of diabetes management symptoms including feeling cool and clammy, which may be related to low glucose levels.  Feeling hot and dry may indicate high glucose levels.  If  you feel these symptoms, check your blood sugar.  Make regular podiatry appointments in order to have feet checked for wounds and toe nails cut by a doctor to prevent infections.    Diagnosis: Acute UTI  Assessment and Plan of Treatment: you were septic from this infection. please complete course of antibiotics as ordered. follow up with your PCP.     PRINCIPAL DISCHARGE DIAGNOSIS  Diagnosis: Altered mental state  Assessment and Plan of Treatment: this was due to infection and very high blood sugar. please continue to monitor for blood sugar at home. complete course of antibiotics as prescribed. follow up with pcp.      SECONDARY DISCHARGE DIAGNOSES  Diagnosis: Diabetes mellitus  Assessment and Plan of Treatment: Continue consistent carbohydrate diet.  Monitor blood glucose levels throughout the day before meals and at bedtime.  Record blood sugars and bring to outpatient providers appointment in order to be reviewed by your doctor for management modifications.  Be aware of diabetes management symptoms including feeling cool and clammy, which may be related to low glucose levels.  Feeling hot and dry may indicate high glucose levels.  If  you feel these symptoms, check your blood sugar.  Make regular podiatry appointments in order to have feet checked for wounds and toe nails cut by a doctor to prevent infections.    Diagnosis: Acute UTI  Assessment and Plan of Treatment: you were septic from this infection. please complete course of antibiotics as ordered. follow up with your PCP. You will be sent home with a 4 day course of cirpofloxacin. ***While taking ciprofloxacin do not take Hydroxyzine and tolterodine while finishin the antibiotics. ***    Diagnosis: Hypertension  Assessment and Plan of Treatment: Continue to take your amlodipine on discharge but do not take your hydrochlorthiazide until you see your primary care provider on when to resume this medication.

## 2023-11-21 ENCOUNTER — APPOINTMENT (OUTPATIENT)
Dept: ENDOCRINOLOGY | Facility: CLINIC | Age: 73
End: 2023-11-21
Payer: MEDICARE

## 2023-11-21 VITALS
BODY MASS INDEX: 37.89 KG/M2 | SYSTOLIC BLOOD PRESSURE: 130 MMHG | OXYGEN SATURATION: 95 % | HEART RATE: 100 BPM | HEIGHT: 68 IN | WEIGHT: 250 LBS | DIASTOLIC BLOOD PRESSURE: 90 MMHG

## 2023-11-21 DIAGNOSIS — E11.9 TYPE 2 DIABETES MELLITUS W/OUT COMPLICATIONS: ICD-10-CM

## 2023-11-21 PROCEDURE — 99205 OFFICE O/P NEW HI 60 MIN: CPT | Mod: 25

## 2023-11-22 PROBLEM — E11.9 DIABETES MELLITUS: Status: ACTIVE | Noted: 2017-12-04

## 2023-12-18 NOTE — CONSULT NOTE ADULT - PROBLEM/RECOMMENDATION-3
[Care Plan reviewed and provided to patient/caregiver] : Care plan reviewed and provided to patient/caregiver [Understands and communicates without difficulty] : Patient/Caregiver understands and communicates without difficulty DISPLAY PLAN FREE TEXT

## 2024-02-06 ENCOUNTER — EMERGENCY (EMERGENCY)
Facility: HOSPITAL | Age: 74
LOS: 1 days | Discharge: HOME | End: 2024-02-06
Attending: EMERGENCY MEDICINE | Admitting: EMERGENCY MEDICINE
Payer: MEDICARE

## 2024-02-06 VITALS
DIASTOLIC BLOOD PRESSURE: 68 MMHG | HEART RATE: 102 BPM | TEMPERATURE: 99 F | OXYGEN SATURATION: 95 % | RESPIRATION RATE: 20 BRPM | SYSTOLIC BLOOD PRESSURE: 104 MMHG

## 2024-02-06 VITALS
OXYGEN SATURATION: 98 % | SYSTOLIC BLOOD PRESSURE: 116 MMHG | DIASTOLIC BLOOD PRESSURE: 62 MMHG | RESPIRATION RATE: 16 BRPM | HEART RATE: 91 BPM | TEMPERATURE: 98 F

## 2024-02-06 DIAGNOSIS — Z98.890 OTHER SPECIFIED POSTPROCEDURAL STATES: Chronic | ICD-10-CM

## 2024-02-06 DIAGNOSIS — Z98.891 HISTORY OF UTERINE SCAR FROM PREVIOUS SURGERY: Chronic | ICD-10-CM

## 2024-02-06 DIAGNOSIS — Z90.49 ACQUIRED ABSENCE OF OTHER SPECIFIED PARTS OF DIGESTIVE TRACT: Chronic | ICD-10-CM

## 2024-02-06 LAB
ALBUMIN SERPL ELPH-MCNC: 3.7 G/DL — SIGNIFICANT CHANGE UP (ref 3.3–5)
ALP SERPL-CCNC: 67 U/L — SIGNIFICANT CHANGE UP (ref 40–120)
ALT FLD-CCNC: 16 U/L — SIGNIFICANT CHANGE UP (ref 4–33)
ANION GAP SERPL CALC-SCNC: 15 MMOL/L — HIGH (ref 7–14)
APPEARANCE UR: ABNORMAL
APTT BLD: 31.8 SEC — SIGNIFICANT CHANGE UP (ref 24.5–35.6)
AST SERPL-CCNC: 19 U/L — SIGNIFICANT CHANGE UP (ref 4–32)
BACTERIA # UR AUTO: ABNORMAL /HPF
BASE EXCESS BLDV CALC-SCNC: 2.9 MMOL/L — SIGNIFICANT CHANGE UP (ref -2–3)
BASOPHILS # BLD AUTO: 0.05 K/UL — SIGNIFICANT CHANGE UP (ref 0–0.2)
BASOPHILS NFR BLD AUTO: 0.4 % — SIGNIFICANT CHANGE UP (ref 0–2)
BILIRUB SERPL-MCNC: 0.5 MG/DL — SIGNIFICANT CHANGE UP (ref 0.2–1.2)
BILIRUB UR-MCNC: ABNORMAL
BLOOD GAS VENOUS COMPREHENSIVE RESULT: SIGNIFICANT CHANGE UP
BUN SERPL-MCNC: 33 MG/DL — HIGH (ref 7–23)
CALCIUM SERPL-MCNC: 9.2 MG/DL — SIGNIFICANT CHANGE UP (ref 8.4–10.5)
CAST: 31 /LPF — HIGH (ref 0–4)
CHLORIDE BLDV-SCNC: 100 MMOL/L — SIGNIFICANT CHANGE UP (ref 96–108)
CHLORIDE SERPL-SCNC: 98 MMOL/L — SIGNIFICANT CHANGE UP (ref 98–107)
CO2 BLDV-SCNC: 29.9 MMOL/L — HIGH (ref 22–26)
CO2 SERPL-SCNC: 25 MMOL/L — SIGNIFICANT CHANGE UP (ref 22–31)
COLOR SPEC: SIGNIFICANT CHANGE UP
CREAT SERPL-MCNC: 1.06 MG/DL — SIGNIFICANT CHANGE UP (ref 0.5–1.3)
DIFF PNL FLD: ABNORMAL
EGFR: 55 ML/MIN/1.73M2 — LOW
EOSINOPHIL # BLD AUTO: 0.04 K/UL — SIGNIFICANT CHANGE UP (ref 0–0.5)
EOSINOPHIL NFR BLD AUTO: 0.3 % — SIGNIFICANT CHANGE UP (ref 0–6)
FLUAV AG NPH QL: SIGNIFICANT CHANGE UP
FLUBV AG NPH QL: SIGNIFICANT CHANGE UP
GAS PNL BLDV: 134 MMOL/L — LOW (ref 136–145)
GAS PNL BLDV: SIGNIFICANT CHANGE UP
GLUCOSE BLDV-MCNC: 177 MG/DL — HIGH (ref 70–99)
GLUCOSE SERPL-MCNC: 178 MG/DL — HIGH (ref 70–99)
GLUCOSE UR QL: NEGATIVE MG/DL — SIGNIFICANT CHANGE UP
HCO3 BLDV-SCNC: 28 MMOL/L — SIGNIFICANT CHANGE UP (ref 22–29)
HCT VFR BLD CALC: 45.5 % — HIGH (ref 34.5–45)
HCT VFR BLDA CALC: 46 % — SIGNIFICANT CHANGE UP (ref 34.5–46.5)
HGB BLD CALC-MCNC: 15.2 G/DL — SIGNIFICANT CHANGE UP (ref 11.7–16.1)
HGB BLD-MCNC: 15.1 G/DL — SIGNIFICANT CHANGE UP (ref 11.5–15.5)
IANC: 10.29 K/UL — HIGH (ref 1.8–7.4)
IMM GRANULOCYTES NFR BLD AUTO: 0.4 % — SIGNIFICANT CHANGE UP (ref 0–0.9)
INR BLD: 1.02 RATIO — SIGNIFICANT CHANGE UP (ref 0.85–1.18)
KETONES UR-MCNC: 15 MG/DL
LACTATE BLDV-MCNC: 2.5 MMOL/L — HIGH (ref 0.5–2)
LEUKOCYTE ESTERASE UR-ACNC: ABNORMAL
LIDOCAIN IGE QN: 11 U/L — SIGNIFICANT CHANGE UP (ref 7–60)
LYMPHOCYTES # BLD AUTO: 0.78 K/UL — LOW (ref 1–3.3)
LYMPHOCYTES # BLD AUTO: 6.7 % — LOW (ref 13–44)
MAGNESIUM SERPL-MCNC: 1.5 MG/DL — LOW (ref 1.6–2.6)
MCHC RBC-ENTMCNC: 28.7 PG — SIGNIFICANT CHANGE UP (ref 27–34)
MCHC RBC-ENTMCNC: 33.2 GM/DL — SIGNIFICANT CHANGE UP (ref 32–36)
MCV RBC AUTO: 86.3 FL — SIGNIFICANT CHANGE UP (ref 80–100)
MONOCYTES # BLD AUTO: 0.48 K/UL — SIGNIFICANT CHANGE UP (ref 0–0.9)
MONOCYTES NFR BLD AUTO: 4.1 % — SIGNIFICANT CHANGE UP (ref 2–14)
NEUTROPHILS # BLD AUTO: 10.29 K/UL — HIGH (ref 1.8–7.4)
NEUTROPHILS NFR BLD AUTO: 88.1 % — HIGH (ref 43–77)
NITRITE UR-MCNC: NEGATIVE — SIGNIFICANT CHANGE UP
NRBC # BLD: 0 /100 WBCS — SIGNIFICANT CHANGE UP (ref 0–0)
NRBC # FLD: 0 K/UL — SIGNIFICANT CHANGE UP (ref 0–0)
PCO2 BLDV: 46 MMHG — SIGNIFICANT CHANGE UP (ref 39–52)
PH BLDV: 7.4 — SIGNIFICANT CHANGE UP (ref 7.32–7.43)
PH UR: 5 — SIGNIFICANT CHANGE UP (ref 5–8)
PHOSPHATE SERPL-MCNC: 2.4 MG/DL — LOW (ref 2.5–4.5)
PLATELET # BLD AUTO: 256 K/UL — SIGNIFICANT CHANGE UP (ref 150–400)
PO2 BLDV: 25 MMHG — SIGNIFICANT CHANGE UP (ref 25–45)
POTASSIUM BLDV-SCNC: 4 MMOL/L — SIGNIFICANT CHANGE UP (ref 3.5–5.1)
POTASSIUM SERPL-MCNC: 4.2 MMOL/L — SIGNIFICANT CHANGE UP (ref 3.5–5.3)
POTASSIUM SERPL-SCNC: 4.2 MMOL/L — SIGNIFICANT CHANGE UP (ref 3.5–5.3)
PROT SERPL-MCNC: 7.6 G/DL — SIGNIFICANT CHANGE UP (ref 6–8.3)
PROT UR-MCNC: 30 MG/DL
PROTHROM AB SERPL-ACNC: 11.4 SEC — SIGNIFICANT CHANGE UP (ref 9.5–13)
RBC # BLD: 5.27 M/UL — HIGH (ref 3.8–5.2)
RBC # FLD: 14.6 % — HIGH (ref 10.3–14.5)
RBC CASTS # UR COMP ASSIST: 7 /HPF — SIGNIFICANT CHANGE UP (ref 0–4)
REVIEW: SIGNIFICANT CHANGE UP
RSV RNA NPH QL NAA+NON-PROBE: SIGNIFICANT CHANGE UP
SAO2 % BLDV: 33.3 % — LOW (ref 67–88)
SARS-COV-2 RNA SPEC QL NAA+PROBE: SIGNIFICANT CHANGE UP
SODIUM SERPL-SCNC: 138 MMOL/L — SIGNIFICANT CHANGE UP (ref 135–145)
SP GR SPEC: 1.03 — HIGH (ref 1–1.03)
SQUAMOUS # UR AUTO: 14 /HPF — HIGH (ref 0–5)
TROPONIN T, HIGH SENSITIVITY RESULT: 16 NG/L — SIGNIFICANT CHANGE UP
UROBILINOGEN FLD QL: 1 MG/DL — SIGNIFICANT CHANGE UP (ref 0.2–1)
WBC # BLD: 11.69 K/UL — HIGH (ref 3.8–10.5)
WBC # FLD AUTO: 11.69 K/UL — HIGH (ref 3.8–10.5)
WBC UR QL: 9 /HPF — HIGH (ref 0–5)

## 2024-02-06 PROCEDURE — 99285 EMERGENCY DEPT VISIT HI MDM: CPT

## 2024-02-06 PROCEDURE — 71046 X-RAY EXAM CHEST 2 VIEWS: CPT | Mod: 26

## 2024-02-06 RX ORDER — CEFPODOXIME PROXETIL 100 MG
1 TABLET ORAL
Qty: 14 | Refills: 0
Start: 2024-02-06 | End: 2024-02-12

## 2024-02-06 RX ORDER — ACETAMINOPHEN 500 MG
1000 TABLET ORAL ONCE
Refills: 0 | Status: COMPLETED | OUTPATIENT
Start: 2024-02-06 | End: 2024-02-06

## 2024-02-06 RX ORDER — CEFTRIAXONE 500 MG/1
1000 INJECTION, POWDER, FOR SOLUTION INTRAMUSCULAR; INTRAVENOUS ONCE
Refills: 0 | Status: COMPLETED | OUTPATIENT
Start: 2024-02-06 | End: 2024-02-06

## 2024-02-06 RX ORDER — SODIUM CHLORIDE 9 MG/ML
1000 INJECTION INTRAMUSCULAR; INTRAVENOUS; SUBCUTANEOUS ONCE
Refills: 0 | Status: COMPLETED | OUTPATIENT
Start: 2024-02-06 | End: 2024-02-06

## 2024-02-06 RX ADMIN — CEFTRIAXONE 100 MILLIGRAM(S): 500 INJECTION, POWDER, FOR SOLUTION INTRAMUSCULAR; INTRAVENOUS at 19:20

## 2024-02-06 RX ADMIN — Medication 400 MILLIGRAM(S): at 19:08

## 2024-02-06 RX ADMIN — SODIUM CHLORIDE 1000 MILLILITER(S): 9 INJECTION INTRAMUSCULAR; INTRAVENOUS; SUBCUTANEOUS at 19:20

## 2024-02-06 NOTE — ED ADULT NURSE NOTE - OBJECTIVE STATEMENT
Pt received to room 7A. Pt A&O x 2, confused, ambulatory at baseline. Pt c/o high glucose from PMD. Pt endorses having 600 FS.  with EMS. Pt endorsing lower back pain and LE numbness. Hx of DM. Pt denies fevers, chills, headache, vision changes, dizziness, chest pain, SOB, numbness or tingling, abdominal pain, N/V/D/C, or urinary symptoms. PERRLA observed. No neuro deficits. Lung sounds clear bilaterally. S1 and S2 heart sounds noted. NSR on cardiac monitor. Abdomen soft, nontender, nondistended. +2 pulses in all extremities. 20G IV placed in the _AC. Labs drawn and sent. Comfort measure provided. Safety maintained. Pending_.

## 2024-02-06 NOTE — ED ADULT TRIAGE NOTE - CHIEF COMPLAINT QUOTE
as per MD office pt F/S was 600 EMS arrived pt f/s was 215 at the scene  c/o right leg pain NoT a CoDe StRokE as per Dr Foster

## 2024-02-06 NOTE — ED PROVIDER NOTE - OBJECTIVE STATEMENT
74 y/o female pt past medical history diabetes type 2, hypertension brought in by daughter complains of altered mental status.  Patient is alert oriented x 4 at baseline.  Patient was referred to ED by PCP.  Fingerstick 600 in office, fingerstick 215 by EMS, fingerstick 200s in triage.  No insulin was given interim.  As per daughter patient started vomiting at around midnight after eating dinner. Was alert and oriented X4 last night. Today she has had waxing and waning orientation. In triage at baseline, upon gathering hx pt is oriented only to self. Pt is compliant on insulin except for last night because she was vomiting. Pt has been admitted for UTIs in the past; no known dysuria or hematuria at this time.

## 2024-02-06 NOTE — ED PROVIDER NOTE - IV ALTEPLASE ADMIN OUTSIDE HIDDEN
Visit Information Date & Time Provider Department Dept. Phone Encounter #  
 10/6/2017  8:15 AM Georgi Fam  [address] [phone] [id number] Follow-up Instructions Return if symptoms worsen or fail to improve. Upcoming Health Maintenance Date Due  
 HPV AGE 9Y-34Y (1 of 3 - Female 3 Dose Series) 2/25/2007 DTaP/Tdap/Td series (1 - Tdap) 2/25/2017 PAP AKA CERVICAL CYTOLOGY 2/25/2017 INFLUENZA AGE 9 TO ADULT 8/1/2017 Allergies as of 10/6/2017  Review Complete On: 10/6/2017 By: Georgi Fam MD  
 No Known Allergies Current Immunizations  Never Reviewed No immunizations on file. Not reviewed this visit You Were Diagnosed With   
  
 Codes Comments Corneal abrasion of right eye due to contact lens    -  Primary ICD-10-CM: G92.674 ICD-9-CM: 371.82 Tonsillitis     ICD-10-CM: J03.90 ICD-9-CM: 286 Vitals BP Pulse Temp Resp Height(growth percentile) Weight(growth percentile) 113/65 (BP 1 Location: Right arm, BP Patient Position: Sitting) 72 97.6 °F (36.4 °C) (Oral) 16 5' 4\" (1.626 m) 141 lb (64 kg) LMP SpO2 BMI OB Status Smoking Status 09/27/2017 (Approximate) 97% 24.2 kg/m2 Having regular periods Never Smoker Vitals History BMI and BSA Data Body Mass Index Body Surface Area  
 24.2 kg/m 2 1.7 m 2 Preferred Pharmacy Pharmacy Name Phone [id number] [address] [place] [phone] Your Updated Medication List  
  
   
This list is accurate as of: 10/6/17  8:38 AM.  Always use your most recent med list.  
  
  
  
  
 erythromycin ophthalmic ointment Commonly known as:  ILOTYCIN Apply small strip to R eye TID prn abrasion  
  
 meloxicam 7.5 mg tablet Commonly known as:  MOBIC  
TAKE 1 TABLET BY MOUTH DAILY  
  
 TRI-PREVIFEM (28) 0.18/0.215/0.25 mg-35 mcg (28) Tab Generic drug:  norgestimate-ethinyl estradiol Take 1 tablet by mouth daily. Prescriptions Sent to Pharmacy Refills  
 erythromycin (ILOTYCIN) ophthalmic ointment 0 Sig: Apply small strip to R eye TID prn abrasion Class: Normal  
 Pharmacy: [address] [place] [address], [address] [place] [address] #: [phone] We Performed the Following AMB POC RAPID STREP A [26654 CPT(R)] Follow-up Instructions Return if symptoms worsen or fail to improve. Patient Instructions Apply a small amount of the eye ointment to R eye 3-4 times today and tomorrow as needed. Recheck Monday if it's not better. Introducing \[facility]\"" & HEALTH SERVICES! [employer] introduces [facility] patient portal. Now you can access parts of your medical record, email your doctor's office, and request medication refills online. 1. In your internet browser, go to [email or web address] 2. Click on the First Time User? Click Here link in the Sign In box. You will see the New Member Sign Up page. 3. Enter your [facility] Access Code exactly as it appears below. You will not need to use this code after youve completed the sign-up process. If you do not sign up before the expiration date, you must request a new code. · [facility] Access Code: [email or web address] Expires: 12/20/2017 12:48 PM 
 
4. Enter the last four digits of your Social Security Number (xxxx) and Date of Birth (mm/dd/yyyy) as indicated and click Submit. You will be taken to the next sign-up page. 5. Create a [employer] ID. This will be your [facility] login ID and cannot be changed, so think of one that is secure and easy to remember. 6. Create a [facility] password. You can change your password at any time. 7. Enter your Password Reset Question and Answer. This can be used at a later time if you forget your password. 8. Enter your e-mail address. You will receive e-mail notification when new information is available in [address] 9. Click Sign Up. You can now view and download portions of your medical record. 10. Click the Download Summary menu link to download a portable copy of your medical information. If you have questions, please visit the Frequently Asked Questions section of the [employer] website. Remember, [employer] is NOT to be used for urgent needs. For medical emergencies, dial 911. Now available from your iPhone and Android! Please provide this summary of care documentation to your next provider. Your primary care clinician is listed as NONE. If you have any questions after today's visit, please call [phone]. show

## 2024-02-06 NOTE — ED PROVIDER NOTE - NSFOLLOWUPINSTRUCTIONS_ED_ALL_ED_FT
You came to the emergency room because of concern for elevated blood glucose level and confusion.     Your symptoms are likely due to viral gastroenteritis.      Your lab work, x-ray results are unremarkable.  Your urine study shows that you may have possible urinary tract infection.      Please take the prescribed antibiotic cefpodoxime 200 mg twice daily for the next 7 days.      Please take your home medication as prescribed by your doctor.      Please follow-up with your primary medical doctor in the clinic within the next 7 days for further monitoring evaluation.      Please come back to the emergency room if your symptoms get worse.

## 2024-02-06 NOTE — ED PROVIDER NOTE - CLINICAL SUMMARY MEDICAL DECISION MAKING FREE TEXT BOX
72 y/o female pt past medical history diabetes type 2, hypertension brought in by daughter complains of altered mental status.  Patient is alert oriented x 4 at baseline.  Patient was referred to ED by PCP.  Fingerstick 600 in office, fingerstick 215 by EMS, fingerstick 200s in triage.  No insulin was given interim.  As per daughter patient started vomiting at around midnight after eating dinner. Was alert and oriented X4 last night. Today she has had waxing and waning orientation. In triage at baseline, upon gathering hx pt is oriented only to self. Pt is compliant on insulin except for last night because she was vomiting. Pt has been admitted for UTIs in the past; no known dysuria or hematuria at this time.    basic labs to r/o infectious etiology. UA to r/o UTI. VBG to evaluate for DKA. IVF

## 2024-02-06 NOTE — ED ADULT NURSE NOTE - NSICDXPASTSURGICALHX_GEN_ALL_CORE_FT
Message   Recorded as Task   Date: 10/08/2016 07:47 AM, Created By: Sunni Smith   Task Name: Call Patient with results   Assigned To: Brendan Weeks   Regarding Patient: Laya Bhardwaj, Status: Active   Comment:    Brendan Weeks - 08 Oct 2016 7:47 AM     Patient Phone: [phone]      UGI, normal anatomy, 1 episode of reflux during study   Juliet Churchill - 10 Oct 2016 8:41 AM     TASK EDITED   LM for parents to  call our office with any questions and results was left on voice mail  Active Problems    1  Gastroesophageal reflux disease, esophagitis presence not specified (530 81) (K21 9)   2  Periumbilical pain (802 17) (R10 33)    Current Meds   1  Citra pH 2 7 GM/30ML Oral Solution; Therapy: (Recorded:70Dvj3414) to Recorded   2  Hydrochlorothiazide TABS; Therapy: (Recorded:64Xmm5711) to Recorded   3  Omeprazole 20 MG Oral Capsule Delayed Release; take one capsule daily by mouth; Therapy: 25SIA4774 to (Berlin Meeks)  Requested for: 05AHV2709; Last   Rx:[id number] Ordered   4  Probiotic CAPS; Therapy: (Recorded:97Xtw2228) to Recorded    Allergies    1   No Known Drug Allergies    Signatures   Electronically signed by : Ivis Choi, ; Oct 10 2016  8:41AM EST                       (Author) PAST SURGICAL HISTORY:  H/O:      History of hysteroscopy     S/P appendectomy     S/P cholecystectomy     S/P rotator cuff surgery

## 2024-02-06 NOTE — ED PROVIDER NOTE - PROGRESS NOTE DETAILS
Hank PGY3: lab and imaging reviewed. Patient's lab work unremarkable, CXR neg, flu-covid neg. UA shows many bacterial with many epithelial cells. Patient reassessed. Patient is well-appearing, NAD, denies any symptoms, AAOx3, mentation back to baseline per daughter. Patient ambulating, tolerating PO intake. Her presenting symptoms were likely due to gastroenteritis which is now resolved. Will discharge with Cefpodoxime for possible UTI.

## 2024-02-06 NOTE — ED PROVIDER NOTE - ATTENDING CONTRIBUTION TO CARE
The patient is a 73y Female who has a past medical and surgery history of  HTN HLD DM2 CAD S/P cath Osteoarthritis Urinary incontinence  cholecystectomy appendectomy rotator cuff surgery hysteroscopy PTED from MDs office w/ reported 600 EMS arrived pt f/s was 215 at the scene  c/o right leg pain A&ox4 pt seemed off slow to respond "at MD office   Vital Signs Last 24 Hrs  T(F): 99.7 HR: 98 BP: 125/77 RR: 20 SpO2: 96% (2024 17:52)   PE: as described; my additions and exceptions are noted in the chart    DATA:  EKG: pending at time of evaluation  LAB: Pending at time of evaluation    IMPRESSION/RISK:  Dx= AMS    Consideration include: sugar almost normalized AMS in past due to infectious sources so will w/u for same   Plan  2 liter bolus  Antibiotics if indicated will review prior culture results from last admission   Blood, urine cultures, CXR RVP   lactate initial and followup  Keep Pox> 90, HR in 90's, SBP >90 (MAP>60) and 1/2 cc/kg/hr urine output   Admit  Reassess

## 2024-02-06 NOTE — ED PROVIDER NOTE - PHYSICAL EXAMINATION
GENERAL: NAD  HEENT:  Atraumatic, pupils equal, round, reactive to light  CHEST/LUNG: Chest rise equal bilaterally, clear breath sounds b/l  HEART: Regular rate and rhythm  ABDOMEN: Soft, Nontender, Nondistended  EXTREMITIES:  Extremities warm  PSYCH: A&Ox1 (self)  SKIN: No obvious rashes or lesions  MSK: No cervical spine TTP, able to range neck to the left and right/ No midline spinal TTP/ No swelling, redness, pain or discharge from   NEUROLOGY: strength and sensation intact in all extremities.

## 2024-02-06 NOTE — ED ADULT TRIAGE NOTE - PAIN RATING/NUMBER SCALE (0-10): ACTIVITY
Labs and annual appt last seen ? Labs?
Spoke with patient made aware he needs an appt and labs patient stated he will call back to set up an appointment 
10 (severe pain)

## 2024-02-08 LAB
CULTURE RESULTS: SIGNIFICANT CHANGE UP
SPECIMEN SOURCE: SIGNIFICANT CHANGE UP

## 2024-02-12 ENCOUNTER — APPOINTMENT (OUTPATIENT)
Dept: ENDOCRINOLOGY | Facility: CLINIC | Age: 74
End: 2024-02-12
Payer: MEDICARE

## 2024-02-12 VITALS
BODY MASS INDEX: 35.46 KG/M2 | HEIGHT: 68 IN | WEIGHT: 234 LBS | OXYGEN SATURATION: 96 % | HEART RATE: 98 BPM | DIASTOLIC BLOOD PRESSURE: 86 MMHG | SYSTOLIC BLOOD PRESSURE: 139 MMHG

## 2024-02-12 DIAGNOSIS — E04.2 NONTOXIC MULTINODULAR GOITER: ICD-10-CM

## 2024-02-12 DIAGNOSIS — E11.65 TYPE 2 DIABETES MELLITUS WITH HYPERGLYCEMIA: ICD-10-CM

## 2024-02-12 DIAGNOSIS — Z79.4 TYPE 2 DIABETES MELLITUS WITH HYPERGLYCEMIA: ICD-10-CM

## 2024-02-12 LAB
CULTURE RESULTS: SIGNIFICANT CHANGE UP
CULTURE RESULTS: SIGNIFICANT CHANGE UP
HBA1C MFR BLD HPLC: 8.8
SPECIMEN SOURCE: SIGNIFICANT CHANGE UP
SPECIMEN SOURCE: SIGNIFICANT CHANGE UP

## 2024-02-12 PROCEDURE — 99215 OFFICE O/P EST HI 40 MIN: CPT

## 2024-02-12 PROCEDURE — G2211 COMPLEX E/M VISIT ADD ON: CPT

## 2024-02-12 PROCEDURE — 83036 HEMOGLOBIN GLYCOSYLATED A1C: CPT | Mod: QW

## 2024-02-12 RX ORDER — METFORMIN HYDROCHLORIDE 1000 MG/1
1000 TABLET, COATED ORAL
Qty: 180 | Refills: 0 | Status: DISCONTINUED | COMMUNITY
Start: 2017-09-19 | End: 2024-02-12

## 2024-02-12 RX ORDER — BLOOD SUGAR DIAGNOSTIC
STRIP MISCELLANEOUS
Qty: 100 | Refills: 5 | Status: ACTIVE | COMMUNITY
Start: 2024-02-12 | End: 1900-01-01

## 2024-02-12 RX ORDER — EXENATIDE 2 MG/.65ML
INJECTION, SUSPENSION, EXTENDED RELEASE SUBCUTANEOUS
Refills: 0 | Status: DISCONTINUED | COMMUNITY
End: 2024-02-12

## 2024-02-12 RX ORDER — ELECTROLYTES/DEXTROSE
32G X 4 MM SOLUTION, ORAL ORAL
Qty: 1 | Refills: 5 | Status: ACTIVE | COMMUNITY
Start: 2024-02-12 | End: 1900-01-01

## 2024-02-12 RX ORDER — METFORMIN HYDROCHLORIDE 1000 MG/1
1000 TABLET, COATED ORAL
Qty: 1 | Refills: 3 | Status: ACTIVE | COMMUNITY
Start: 2024-02-12 | End: 1900-01-01

## 2024-02-12 RX ORDER — INSULIN GLARGINE 100 [IU]/ML
100 INJECTION, SOLUTION SUBCUTANEOUS DAILY
Qty: 1 | Refills: 5 | Status: ACTIVE | COMMUNITY
Start: 2024-02-12 | End: 1900-01-01

## 2024-02-12 RX ORDER — LANCETS 33 GAUGE
EACH MISCELLANEOUS
Qty: 180 | Refills: 2 | Status: ACTIVE | COMMUNITY
Start: 2024-02-12 | End: 1900-01-01

## 2024-02-12 RX ORDER — SEMAGLUTIDE 1.34 MG/ML
4 INJECTION, SOLUTION SUBCUTANEOUS
Qty: 1 | Refills: 5 | Status: ACTIVE | COMMUNITY
Start: 2023-11-22 | End: 1900-01-01

## 2024-02-12 RX ORDER — ISOPROPYL ALCOHOL 70 ML/100ML
SWAB TOPICAL
Qty: 1 | Refills: 9 | Status: ACTIVE | COMMUNITY
Start: 2024-02-12 | End: 1900-01-01

## 2024-02-12 NOTE — END OF VISIT
[Time Spent: ___ minutes] : I have spent [unfilled] minutes of time on the encounter. [] : Fellow [FreeTextEntry3] : I agree with the assessment and plan as noted above in fellow's note.

## 2024-02-12 NOTE — ASSESSMENT
[Diabetes Foot Care] : diabetes foot care [Long Term Vascular Complications] : long term vascular complications of diabetes [Carbohydrate Consistent Diet] : carbohydrate consistent diet [Importance of Diet and Exercise] : importance of diet and exercise to improve glycemic control, achieve weight loss and improve cardiovascular health [Exercise/Effect on Glucose] : exercise/effect on glucose [Hypoglycemia Management] : hypoglycemia management [Self Monitoring of Blood Glucose] : self monitoring of blood glucose [Retinopathy Screening] : Patient was referred to ophthalmology for retinopathy screening [FreeTextEntry1] : 72 yo F with PMH of HTN, HLD, CAD s/p PCI (2022), h/o venous insufficiency, GERD, OA, uterine cancer s/p hysterectomy, h/o urinary incontinence, s/p appendectomy, s/p cholecystectomy, here for hospital follow-up of Type 2 Diabetes c/b recent HHS.  Type 2 Diabetes, uncontrolled: - A1c 8.8% today <-- 12.0% (11/2/2023) <-- 15.7% (10/4/2023) <-- 13.7% (6/5/2023) <-- 9.0% (12/2019).  - fasting glucose 90s-150s - Increase Ozempic to 1.0mg weekly for further glycemic control, cardiac benefit, and weight loss benefit (BMI 35).  Denies personal or family history of thyroid cancer or pancreatitis. - Continue Lantus 30 units qhs  - Continue metformin 1000 mg BID.  - Not a great candidate for SGLT2i at this time given h/o recent UTIs and h/o urinary incontinence. - Discussed hypoglycemia management.  - Discussed diet modification, carb consistent diet, and exercise.  - [employer] provided training for [employer] 3 today (pt has it with her) - F/u with ophthalmology and podiatry annually.  UTD with ophtho.  Given h/o mod NPDR and on Ozempic, recommend close follow-up with ophtho.  HTN: - Goal BP <130/80, /90 today. - On HCTZ 12.5 mg qd and amlodipine 10 mg qd.  Continue management for PCP. - eGFR 78 (11/2/2023). Check urine alb/cr next visit  HLD: - Goal LDL <70.  LDL 75, HDL 50,  (11/2/2023). - On pravastatin 40 mg qd.  Continue management as per PCP.  Thyroid: -H/o possible tiny thyroid nodules. - repeat thyroid US

## 2024-02-12 NOTE — REVIEW OF SYSTEMS
tolerated procedure well  pain meds as needed  DVT and GI prophylaxis  physical therapy as tolerated [As Noted in HPI] : as noted in HPI [Negative] : Heme/Lymph

## 2024-02-12 NOTE — HISTORY OF PRESENT ILLNESS
[FreeTextEntry1] : 72 yo F with PMH of HTN, HLD, CAD s/p PCI (), h/o venous insufficiency, GERD, OA, uterine cancer s/p hysterectomy, h/o urinary incontinence, s/p appendectomy, s/p cholecystectomy, here for hospital follow-up of Type 2 Diabetes c/b recent HHS.  Hospital Course (10/5/23-10/8/23): This is a 74 y/o F with PMHx of CAD s/p PCI on aspirin, poorly-controlled T2DM on insulin, HTN and HLD who presented to the ED for acute metabolic encephalopathy secondary to UTI vs hyperglycemia. Presented with altered mental status, glucose >600, no acidosis, minimal ketonuria, BHB 0.5. Initial AMS c/w HHS. Endocrinology consulted for uncontrolled T2DM w/ A1c 15.7%.  Patient was diagnosed w/ type 2 diabetes about 2 decades ago. Daughter noted patient's A1c usually fell in the 9% range, but in the past year, has been more elevated than before. She was hospitalized in 2023 at [facility] for altered mental status, secondary to UTI and on admission, was found to be hyperglycemic w/ blood sugar >500. She was discharged home on lantus 25 units at bedtime, januvia 100mg daily and metformin 1000mg twice a day. She was also referred to an endocrinology clinic but failed to follow up. The daughter noted she prepares the pill box, and patient self administers insulin. When asked about her diabetes medications, patient was unable to recall the specific doses of her medications, but noted taking metformin, januvia and insulin on a daily basis. She denied routine blood sugar checks. Her blood sugars have been managed by her primary care provider Dr. Salgado, though she has not seen him recently. She also previously followed with an endocrinologist (does not remember name), most recently earlier this year. Patient mostly eats take out. Discharge DM Meds: Ideally, recommend basal/bolus insulin, however patient is only amenable to once daily insulin injections. Patient may need extra assistance either through daughter or home health aid. Ozempic 0.25 mg sq q week. Would not recommend SGLT2i given recent UTI. Can continue metformin 1000 mg PO BID. STOP JANUVIA as patient will be dcd on GLP1.   CURRENT HPI: Diagnosis: >20 years ago with T2DM Previous Endo: Dr. Watson Current DM Medications: Ozempic 0.5mg weekly (started 0.25mg weekly on 10/2023, icnreased to 0.5mg on 2023, Basaglar 30 units qhs (started in ), metformin 1000 mg BID (on this for a long time, tolerating well)  Past DM medications: Bydureon weekly (was on it a while ago, was on it >1 year from previous endo), Januvia 100 mg daily (stopped 10/2023 due to starting Ozempic) Adherence: good  A1c: 8.8% today <- 12.0% (2023) <-- 15.7% (10/4/2023) <-- 13.7% (2023) <-- 9.0% (2019).  Pertinent labs: ARIAN 0.01, ZT8 <15 (2023).   SMBG (Sifteouch Verio Flex): checks 1x/day Did not bring glucometer today, patient recall as per below: fasting-   Hypoglycemia: Denies   Complications: +HHS (10/2023). Denies h/o DKA. Macrovascular Complications: +CAD s/p PCI (~). Denies CVA. F/b cardiologist Dr. Elena ([place] [facility]). Also had pulmonologist. Eyes: Last ophthalmology visit 10/2023 Dr. Johnson (see scanned report) - has appt next week . +Moderate NPDR s/p laser R eye. Denies blurry vision. Feet: Last podiatry visit a while ago. Denies h/o neuropathy. Denies h/o foot ulcers or sores. Nephrology: eGFR 78 (2023). +UTI x 2 (2023 and 10/2023, still resolving, f/b urology Dr. Baeza). +Urinary incontinence. Denies polyuria or polydipsia.  Weight Change: 234lbs today, down from 250lbs 2023. Used to be 350 lbs,. Reports a while back had 100 lbs with lifestyle changes and phentermine, now off phentermine given cardiac history.  BP: 139/86 LDL 75 in 2023, , TChol 155 - pt taking pravastatin 40 mg qd ACE-  was on lisinopril in the past, was stopped by cardiologist, does not know why  Current Diet: usually 2 meals per day, watching carb intake brunch (1pm)- coffee (stopped sugar, uses 1 saccharine) and half bagel or half sandwich dinner (7:30pm)- soup, grilled cheese, shared chicken platter, salad, sometimes veggies, often takeout (states she is too lazy to cook) snacks- sometimes at night, small glass of diet iced tea, 2 regular cookies or low sugar  drinks- mostly water, no soda or juices Exercise: not as much, has R knee pain and sciatica (sees pain management)  Thyroid: Patient reports that a while back her previous endocrinologist did a thyroid ultrasound, found " tiny thyroid nodules" that were not concerning and was told she did not need repeat thyroid ultrasound at that time.  Denies any other known thyroid conditions.   PMH: HTN, HLD, CAD s/p PCI (~), h/o venous insufficiency, GERD, OA, uterine cancer s/p hysterectomy (4-5 years ago, has not seen GYN recently), h/o urinary incontinence, ?h/o small thyroid nodules Surgical Hx: s/p RCT surgeries x 3, s/p , s/p cholecystectomy, s/p appendectomy  Social Hx: former smoker (quit about 40 years ago, 1/2 ppd x 20 years), denies alcohol use FHx: mom (DM), mat gma (DM) Current Meds: HCTZ 12.5 mg qd (restarted by PCP post-hospitalization), amlodipine 10 mg qd, aspirin 81 mg qd, dexlansopazole 60 mg qd, duloxetine 30 mg qd, gabapentin 600 mg BID, hydroxyzine 10 mg qd, pravastatin 40 mg qd, tolterodine 4 mg qd Supplements: lysine

## 2024-02-12 NOTE — PHYSICAL EXAM
[Alert] : alert [No Acute Distress] : no acute distress [Normal Sclera/Conjunctiva] : normal sclera/conjunctiva [No Proptosis] : no proptosis [Thyroid Not Enlarged] : the thyroid was not enlarged [No Respiratory Distress] : no respiratory distress [No Accessory Muscle Use] : no accessory muscle use [Clear to Auscultation] : lungs were clear to auscultation bilaterally [Normal S1, S2] : normal S1 and S2 [Normal Rate] : heart rate was normal [Regular Rhythm] : with a regular rhythm [No Edema] : no peripheral edema [Normal Bowel Sounds] : normal bowel sounds [Not Tender] : non-tender [Soft] : abdomen soft [No Spinal Tenderness] : no spinal tenderness [Normal Gait] : normal gait [No Rash] : no rash [Right foot was examined, including] : right foot ~C was examined, including visual inspection with sensory and pulse exams [Left foot was examined, including] : left foot ~C was examined, including visual inspection with sensory and pulse exams [Normal] : normal [2+] : 2+ in the dorsalis pedis [Normal Reflexes] : deep tendon reflexes were 2+ and symmetric [Oriented x3] : oriented to person, place, and time [Normal Affect] : the affect was normal [Normal Insight/Judgement] : insight and judgment were intact [Normal Mood] : the mood was normal [Normal Sensation on Monofilament Testing] : normal sensation on monofilament testing of lower extremities [Diminished Throughout Both Feet] : normal tactile sensation with monofilament testing throughout both feet [de-identified] : MFT done today 11/21/2023

## 2024-03-06 NOTE — ED ADULT NURSE NOTE - NSSEPSISCRITERIAMET_ED_A_ED
"Mammogram shows an area of \"focal asymmetry\".  Nothing suspicious otherwise.  May be nothing but the radiologist recommends repeat spot compression mammogram on the left side with ultrasound.  Please order thank you" Pt called and requested refill of hydrocodone and diazepam to be sent to:  [id number], [place] [id number] [facility] -  [phone] - F [phone]      I do not see hydrocodone in his chart and am wondering if he meant oxycodone?   Abnormal Lactate Abnormal VS & WBC/Abnormal Lactate

## 2024-03-11 ENCOUNTER — APPOINTMENT (OUTPATIENT)
Dept: ENDOCRINOLOGY | Facility: CLINIC | Age: 74
End: 2024-03-11
Payer: MEDICARE

## 2024-03-11 VITALS — WEIGHT: 250 LBS | BODY MASS INDEX: 38.01 KG/M2

## 2024-03-11 PROCEDURE — 95251 CONT GLUC MNTR ANALYSIS I&R: CPT

## 2024-03-11 PROCEDURE — G0108 DIAB MANAGE TRN  PER INDIV: CPT

## 2024-04-16 NOTE — ED PROCEDURE NOTE - CPROC ED POST PROC CARE GUIDE1
Melvin Ty (:  1981) is a 42 y.o. female,Established patient, here for evaluation of the following chief complaint(s):  Back Pain (Patient states sciactic nerve feels better , still has some stiffness. )         ASSESSMENT/PLAN:  1. Acute left-sided low back pain with left-sided sciatica  Comments:  Patient is about 50% improved since last office visit.  Has 2 days left on steroid Dosepak.  2. Pure hypercholesterolemia  Comments:  Discussed lipid panel with patient.  LDL was 103.  Advised diet exercise changes and we will recheck lipid panel in 6 to 12 months.      Return in about 6 months (around 10/16/2024) for to monitor medical conditions.         Subjective   SUBJECTIVE/OBJECTIVE:  HPI 42-year-old female here for Back Pain (Patient states sciactic nerve feels better , still has some stiffness. )    Review of Systems   Constitutional:  Negative for activity change, appetite change, chills, diaphoresis, fatigue and fever.   Respiratory:  Negative for cough, choking, chest tightness, shortness of breath and wheezing.    Cardiovascular:  Negative for chest pain, palpitations and leg swelling.   Genitourinary:  Negative for difficulty urinating.   Musculoskeletal:  Positive for back pain (sciatica pain is at least 50% better since being on the steroids and exercises/stretches given to her last week.). Negative for neck pain.   Skin:  Negative for rash and wound.   Allergic/Immunologic: Negative for environmental allergies, food allergies and immunocompromised state.   Neurological:  Negative for dizziness, seizures, syncope, speech difficulty, weakness, light-headedness, numbness and headaches.   Psychiatric/Behavioral:  Negative for agitation, behavioral problems, confusion, self-injury, sleep disturbance and suicidal ideas.           Objective   /82   Pulse 88   Temp 98.5 °F (36.9 °C) (Temporal)   Resp 18   Ht 1.651 m (5' 5\")   Wt 68.2 kg (150 lb 6.4 oz)   LMP 2024   SpO2 99%   Verbal/written post procedure instructions were given to patient/caregiver.

## 2024-05-13 ENCOUNTER — APPOINTMENT (OUTPATIENT)
Dept: ENDOCRINOLOGY | Facility: CLINIC | Age: 74
End: 2024-05-13

## 2024-07-22 ENCOUNTER — APPOINTMENT (OUTPATIENT)
Dept: ENDOCRINOLOGY | Facility: CLINIC | Age: 74
End: 2024-07-22
Payer: MEDICARE

## 2024-07-22 VITALS
DIASTOLIC BLOOD PRESSURE: 62 MMHG | BODY MASS INDEX: 37.89 KG/M2 | HEIGHT: 68 IN | OXYGEN SATURATION: 95 % | SYSTOLIC BLOOD PRESSURE: 134 MMHG | WEIGHT: 250 LBS | HEART RATE: 94 BPM

## 2024-07-22 DIAGNOSIS — I10 ESSENTIAL (PRIMARY) HYPERTENSION: ICD-10-CM

## 2024-07-22 DIAGNOSIS — E04.1 NONTOXIC SINGLE THYROID NODULE: ICD-10-CM

## 2024-07-22 DIAGNOSIS — Z79.4 TYPE 2 DIABETES MELLITUS WITH HYPERGLYCEMIA: ICD-10-CM

## 2024-07-22 DIAGNOSIS — E78.5 HYPERLIPIDEMIA, UNSPECIFIED: ICD-10-CM

## 2024-07-22 DIAGNOSIS — E11.65 TYPE 2 DIABETES MELLITUS WITH HYPERGLYCEMIA: ICD-10-CM

## 2024-07-22 LAB — HBA1C MFR BLD HPLC: 8.3

## 2024-07-22 PROCEDURE — G2211 COMPLEX E/M VISIT ADD ON: CPT

## 2024-07-22 PROCEDURE — 95251 CONT GLUC MNTR ANALYSIS I&R: CPT

## 2024-07-22 PROCEDURE — 83036 HEMOGLOBIN GLYCOSYLATED A1C: CPT | Mod: QW

## 2024-07-22 PROCEDURE — 99215 OFFICE O/P EST HI 40 MIN: CPT

## 2024-07-22 RX ORDER — SEMAGLUTIDE 2.68 MG/ML
8 INJECTION, SOLUTION SUBCUTANEOUS
Qty: 1 | Refills: 1 | Status: ACTIVE | COMMUNITY
Start: 2024-07-22 | End: 1900-01-01

## 2024-07-22 NOTE — HISTORY OF PRESENT ILLNESS
[FreeTextEntry1] : 72 yo F with PMH of HTN, HLD, CAD s/p PCI (), h/o venous insufficiency, GERD, OA, uterine cancer s/p hysterectomy, h/o urinary incontinence, s/p appendectomy, s/p cholecystectomy, here for hospital follow-up of Type 2 Diabetes   #Type 2 DM Diagnosis: >20 years ago with T2DM Current DM Medications:  1. Ozempic 1 mg weekly  2.  Basaglar 30 units qhs   3. metformin 1000 mg BID   Past DM medications:  Bydureon weekly (was on it a while ago, was on it >1 year from previous endo) Januvia 100 mg daily (stopped 10/2023 due to starting Ozempic) SGLT-2i- UTI  Adherence: good   A1c: 8.3% today <-- 8.8% <- 12.0% (2023) <-- 15.7% (10/4/2023) <-- 13.7% (2023) <-- 9.0% (2019).  Pertinent labs: ARIAN 0.01, ZT8 <15 (2023).   SMBG  Patient uses a freestyle kia.  I have downloaded and reviewed her kia data.  Her time in range is 71%, high 28%, very high 1%, low 0%, very low 0%.  Average glucose is 165 with a GMI of 7.3% and a glucose variability of 19.8%.  Based on patient's glucose data, she is having mostly postprandial hyperglycemia.  Fasting blood sugar also ranging in the 140s to 150s.  Hypoglycemia: Denies   Complications: +HHS (10/2023). Denies h/o DKA. Macrovascular Complications: +CAD s/p PCI (~). Denies CVA. F/b cardiologist Dr. Elena ([facility]). Also had pulmonologist. Eyes UTD Dr. Johnson (see scanned report) - has appt next week   +Moderate NPDR s/p laser R eye. Denies blurry vision  Feet: Last podiatry visit a while ago.  Denies h/o neuropathy Denies h/o foot ulcers or sores.  Nephrology: eGFR 78 (2023). +UTI x 2 (2023 and 10/2023, still resolving, f/b urology Dr. Baeza). +Urinary incontinence. Denies polyuria or polydipsia.  Weight Change: 250lbs  BP: 134/62 LDL 75 in 2023, , TChol 155 - pt taking pravastatin 40 mg qd  ACE-  was on lisinopril in the past  Current Diet: usually 2 meals per day, watching carb intake brunch - coffee with bagel  dinner (7:30pm)- soup, grilled cheese, shared chicken platter, salad, sometimes veggies, often takeout (states she is too lazy to cook) drinks- mostly water, no soda or juices Exercise: not as much   Thyroid: Patient reports that a while back her previous endocrinologist did a thyroid ultrasound, found " tiny thyroid nodules" that were not concerning and was told she did not need repeat thyroid ultrasound at that time.  Denies any other known thyroid conditions.   PMH: HTN, HLD, CAD s/p PCI (~), h/o venous insufficiency, GERD, OA, uterine cancer s/p hysterectomy (4-5 years ago, has not seen GYN recently), h/o urinary incontinence, ?h/o small thyroid nodules Surgical Hx: s/p RCT surgeries x 3, s/p , s/p cholecystectomy, s/p appendectomy  Social Hx: former smoker (quit about 40 years ago, 1/2 ppd x 20 years), denies alcohol use FHx: mom (DM), mat gma (DM)  #HTN - Lisinopril 30mg and hctz   #HLD - pravastatin

## 2024-07-22 NOTE — ASSESSMENT
[Diabetes Foot Care] : diabetes foot care [Long Term Vascular Complications] : long term vascular complications of diabetes [Carbohydrate Consistent Diet] : carbohydrate consistent diet [Importance of Diet and Exercise] : importance of diet and exercise to improve glycemic control, achieve weight loss and improve cardiovascular health [Exercise/Effect on Glucose] : exercise/effect on glucose [Hypoglycemia Management] : hypoglycemia management [Self Monitoring of Blood Glucose] : self monitoring of blood glucose [Retinopathy Screening] : Patient was referred to ophthalmology for retinopathy screening [FreeTextEntry1] : 72 yo F with PMH of HTN, HLD, CAD s/p PCI (2022), h/o venous insufficiency, GERD, OA, uterine cancer s/p hysterectomy, h/o urinary incontinence, s/p appendectomy, s/p cholecystectomy, here for hospital follow-up of Type 2 Diabetes c/b HHS.  Type 2 Diabetes, uncontrolled: - A1c 8.3% today <--8.8% <-- 12.0% (11/2/2023) <-- 15.7% (10/4/2023) <-- 13.7% (6/5/2023) <-- 9.0% (12/2019).  - Patient uses a freestyle kia.  I have downloaded and reviewed her kia data.  Her time in range is 71%, high 28%, very high 1%, low 0%, very low 0%.  Average glucose is 165 with a GMI of 7.3% and a glucose variability of 19.8%.  Based on patient's glucose data, she is having mostly postprandial hyperglycemia.  Fasting blood sugar also ranging in the 140s to 150s. Plan: - Increase Ozempic to 2mg weekly  - Continue Lantus 30 units qhs  - Continue metformin 1000 mg BID.  - Not a good candidate for SGLT2i at this time given h/o recent UTIs and h/o urinary incontinence. - Discussed diet modification, carb consistent diet, and exercise.  - F/u with ophthalmology and podiatry annually.  UTD with ophtho.  Given h/o mod NPDR and on Ozempic, recommend close follow-up with ophtho.  HTN: - On HCTZ 12.5 mg qd and lisinopril.  Continue management for PCP. - eGFR 78 (11/2/2023). Check urine alb/cr   HLD: - Goal LDL <70 - On pravastatin 40 mg qd - Check lipid panel   Thyroid: -H/o possible tiny thyroid nodules. - recommended thyroid US last visit but patient didn't get it done  - repeat thyroid US

## 2024-07-22 NOTE — PHYSICAL EXAM
[Alert] : alert [No Acute Distress] : no acute distress [Normal Sclera/Conjunctiva] : normal sclera/conjunctiva [No Proptosis] : no proptosis [Thyroid Not Enlarged] : the thyroid was not enlarged [No Respiratory Distress] : no respiratory distress [No Accessory Muscle Use] : no accessory muscle use [Clear to Auscultation] : lungs were clear to auscultation bilaterally [Normal S1, S2] : normal S1 and S2 [Normal Rate] : heart rate was normal [Regular Rhythm] : with a regular rhythm [No Edema] : no peripheral edema [Normal Bowel Sounds] : normal bowel sounds [Not Tender] : non-tender [Soft] : abdomen soft [No Spinal Tenderness] : no spinal tenderness [Normal Gait] : normal gait [No Rash] : no rash [Right foot was examined, including] : right foot ~C was examined, including visual inspection with sensory and pulse exams [Left foot was examined, including] : left foot ~C was examined, including visual inspection with sensory and pulse exams [Normal] : normal [2+] : 2+ in the dorsalis pedis [Normal Reflexes] : deep tendon reflexes were 2+ and symmetric [Normal Sensation on Monofilament Testing] : normal sensation on monofilament testing of lower extremities [Oriented x3] : oriented to person, place, and time [Normal Affect] : the affect was normal [Normal Insight/Judgement] : insight and judgment were intact [Normal Mood] : the mood was normal [Diminished Throughout Both Feet] : normal tactile sensation with monofilament testing throughout both feet [de-identified] : MFT done today 11/21/2023

## 2024-07-23 DIAGNOSIS — E11.9 TYPE 2 DIABETES MELLITUS W/OUT COMPLICATIONS: ICD-10-CM

## 2024-07-23 LAB
ALBUMIN SERPL ELPH-MCNC: 3.6 G/DL
ALP BLD-CCNC: 73 U/L
ALT SERPL-CCNC: 14 U/L
ANION GAP SERPL CALC-SCNC: 19 MMOL/L
AST SERPL-CCNC: 28 U/L
BILIRUB SERPL-MCNC: 0.2 MG/DL
BUN SERPL-MCNC: 27 MG/DL
CALCIUM SERPL-MCNC: 9.2 MG/DL
CHLORIDE SERPL-SCNC: 104 MMOL/L
CHOLEST SERPL-MCNC: 161 MG/DL
CO2 SERPL-SCNC: 16 MMOL/L
CREAT SERPL-MCNC: 0.93 MG/DL
CREAT SPEC-SCNC: 136 MG/DL
EGFR: 65 ML/MIN/1.73M2
GLUCOSE SERPL-MCNC: 168 MG/DL
HDLC SERPL-MCNC: 40 MG/DL
LDLC SERPL CALC-MCNC: 85 MG/DL
MICROALBUMIN 24H UR DL<=1MG/L-MCNC: 3.7 MG/DL
MICROALBUMIN/CREAT 24H UR-RTO: 27 MG/G
NONHDLC SERPL-MCNC: 121 MG/DL
POTASSIUM SERPL-SCNC: 6 MMOL/L
PROT SERPL-MCNC: 7.4 G/DL
SODIUM SERPL-SCNC: 139 MMOL/L
TRIGL SERPL-MCNC: 214 MG/DL

## 2024-07-30 LAB
ANION GAP SERPL CALC-SCNC: 14 MMOL/L
BUN SERPL-MCNC: 16 MG/DL
CALCIUM SERPL-MCNC: 9.3 MG/DL
CHLORIDE SERPL-SCNC: 102 MMOL/L
CO2 SERPL-SCNC: 26 MMOL/L
CREAT SERPL-MCNC: 0.87 MG/DL
EGFR: 70 ML/MIN/1.73M2
GLUCOSE SERPL-MCNC: 160 MG/DL
POTASSIUM SERPL-SCNC: 4.8 MMOL/L
SODIUM SERPL-SCNC: 142 MMOL/L
TSH SERPL-ACNC: 2.62 UIU/ML

## 2024-09-28 NOTE — ED PROVIDER NOTE - DATE/TIME 1
Goal Outcome Evaluation: pt is cleared for discharge and will follow up with the orthopedic office on Monday. Pt is A/O x4, is agreeable to the plan of care and verbalizes understanding.       Problem: Adult Inpatient Plan of Care  Goal: Plan of Care Review  Outcome: Met  Goal: Patient-Specific Goal (Individualized)  Outcome: Met  Goal: Absence of Hospital-Acquired Illness or Injury  Outcome: Met  Intervention: Identify and Manage Fall Risk  Recent Flowsheet Documentation  Taken 9/28/2024 0933 by Maya Cam RN  Safety Promotion/Fall Prevention:   room organization consistent   safety round/check completed  Taken 9/28/2024 0849 by Maya Cam RN  Safety Promotion/Fall Prevention:   room organization consistent   safety round/check completed  Taken 9/28/2024 0730 by Maya Cam RN  Safety Promotion/Fall Prevention:   room organization consistent   safety round/check completed  Intervention: Prevent Skin Injury  Recent Flowsheet Documentation  Taken 9/28/2024 0933 by Maya Cam RN  Body Position: position changed independently  Taken 9/28/2024 0849 by Maya Cam RN  Body Position: position changed independently  Taken 9/28/2024 0730 by Maya Cam RN  Body Position: position changed independently  Intervention: Prevent and Manage VTE (Venous Thromboembolism) Risk  Recent Flowsheet Documentation  Taken 9/28/2024 0933 by Maya Cam RN  Activity Management: activity encouraged  Taken 9/28/2024 0849 by Maya Cam RN  Activity Management:   activity encouraged   up ad maxine  VTE Prevention/Management: sequential compression devices off  Range of Motion: active ROM (range of motion) encouraged  Taken 9/28/2024 0730 by Maya Cam RN  Activity Management: activity encouraged  Intervention: Prevent Infection  Recent Flowsheet Documentation  Taken 9/28/2024 0933 by Maya Cam RN  Infection Prevention: single patient room provided  Taken 9/28/2024 0849 by Tutu  SANDRO Trejo  Infection Prevention: single patient room provided  Taken 9/28/2024 0730 by Maya Cam RN  Infection Prevention: single patient room provided  Goal: Optimal Comfort and Wellbeing  Outcome: Met  Intervention: Monitor Pain and Promote Comfort  Recent Flowsheet Documentation  Taken 9/28/2024 0849 by Maya Cam RN  Pain Management Interventions: see MAR  Intervention: Provide Person-Centered Care  Recent Flowsheet Documentation  Taken 9/28/2024 0849 by Maya Cam RN  Trust Relationship/Rapport:   care explained   emotional support provided   empathic listening provided  Goal: Readiness for Transition of Care  Outcome: Met     Problem: Pain Acute  Goal: Acceptable Pain Control and Functional Ability  Outcome: Met  Intervention: Prevent or Manage Pain  Recent Flowsheet Documentation  Taken 9/28/2024 0849 by Maya Cam RN  Medication Review/Management: medications reviewed  Taken 9/28/2024 0730 by Maya Cam RN  Medication Review/Management: medications reviewed  Intervention: Develop Pain Management Plan  Recent Flowsheet Documentation  Taken 9/28/2024 0849 by Maya Cam RN  Pain Management Interventions: see MAR     Problem: Skin Injury Risk Increased  Goal: Skin Health and Integrity  Outcome: Met  Intervention: Optimize Skin Protection  Recent Flowsheet Documentation  Taken 9/28/2024 0933 by Maya Cam RN  Head of Bed (HOB) Positioning: HOB elevated  Taken 9/28/2024 0849 by Maya Cam RN  Head of Bed (HOB) Positioning: HOB elevated  Taken 9/28/2024 0730 by Maya Cam RN  Head of Bed (HOB) Positioning: HOB elevated                                                  06-Feb-2024 22:41

## 2024-10-10 ENCOUNTER — RX RENEWAL (OUTPATIENT)
Age: 74
End: 2024-10-10

## 2025-01-13 ENCOUNTER — RX RENEWAL (OUTPATIENT)
Age: 75
End: 2025-01-13

## 2025-01-15 NOTE — DISCHARGE NOTE NURSING/CASE MANAGEMENT/SOCIAL WORK - NSPROMEDSBROUGHTTOHOSP_GEN_A_NUR
[facility]  [address]   [address] [place] [address]  Tel: [phone]   Fax: [phone]    Subjective     CC: Diabetic foot exam and painful and elongated toe nails    Interval:   - Recent A1C of around 7% per pt  - Patient states her toenails are elongated and painful    HPI:  Moy Mckinney is a 60 y.o. year old female who presents to clinic today for diabetic foot examination and also complaining of painful and elongated toenails.  The patient is a diabetic, and they're last HgbA1c was 8.1% in (2023). The patient states their digits are painful when the toenails are elongated, causing rubbing in shoe gear. The patient admits to tingling and numbness in the lower extremities. Patient denies any rest pain or claudication symptoms.The patient denies any history of acute trauma. The patient denies any other pedal complaints.     Primary care physician is Iona Arroyo APRN - CNP.    ROS:    Constitutional: Denies nausea, vomiting, fever, chills.  Neurologic: Admits to numbness, tingling, and burning in the feet.    Vascular: Denies symptoms of lower extremity claudication.    Skin: Denies open wounds.  Otherwise negative except as noted in the HPI.     PMH:  Past Medical History:   Diagnosis Date    Anxiety     Chronic back pain     Chronic kidney disease     Hypertension     Kidney stone     Mitral valve prolapse 1982    Stroke (cerebrum) ([facility])        Surgical History:   Past Surgical History:   Procedure Laterality Date     SECTION      HYSTERECTOMY (CERVIX STATUS UNKNOWN)  2008    TONSILLECTOMY         Social History:  Social History     Tobacco Use    Smoking status: Never    Smokeless tobacco: Never   Substance Use Topics    Alcohol use: Not Currently    Drug use: Never       Medications:  Prior to Admission medications    Medication Sig Start Date End Date Taking? Authorizing Provider   acetaminophen (TYLENOL) 500 MG tablet Take 2 tablets by mouth 3 times  no

## 2025-02-10 ENCOUNTER — APPOINTMENT (OUTPATIENT)
Dept: ENDOCRINOLOGY | Facility: CLINIC | Age: 75
End: 2025-02-10

## 2025-02-10 VITALS
WEIGHT: 247 LBS | OXYGEN SATURATION: 96 % | DIASTOLIC BLOOD PRESSURE: 77 MMHG | HEIGHT: 68 IN | HEART RATE: 83 BPM | SYSTOLIC BLOOD PRESSURE: 161 MMHG | BODY MASS INDEX: 37.44 KG/M2

## 2025-02-10 DIAGNOSIS — Z79.4 TYPE 2 DIABETES MELLITUS WITH HYPERGLYCEMIA: ICD-10-CM

## 2025-02-10 DIAGNOSIS — I10 ESSENTIAL (PRIMARY) HYPERTENSION: ICD-10-CM

## 2025-02-10 DIAGNOSIS — E11.65 TYPE 2 DIABETES MELLITUS WITH HYPERGLYCEMIA: ICD-10-CM

## 2025-02-10 DIAGNOSIS — E78.5 HYPERLIPIDEMIA, UNSPECIFIED: ICD-10-CM

## 2025-02-10 DIAGNOSIS — E11.9 TYPE 2 DIABETES MELLITUS W/OUT COMPLICATIONS: ICD-10-CM

## 2025-02-10 DIAGNOSIS — E04.1 NONTOXIC SINGLE THYROID NODULE: ICD-10-CM

## 2025-02-10 PROCEDURE — 99214 OFFICE O/P EST MOD 30 MIN: CPT

## 2025-02-10 PROCEDURE — 82962 GLUCOSE BLOOD TEST: CPT

## 2025-02-10 PROCEDURE — 83036 HEMOGLOBIN GLYCOSYLATED A1C: CPT | Mod: QW

## 2025-02-10 PROCEDURE — 95251 CONT GLUC MNTR ANALYSIS I&R: CPT

## 2025-02-10 PROCEDURE — G2211 COMPLEX E/M VISIT ADD ON: CPT

## 2025-02-10 RX ORDER — TIRZEPATIDE 12.5 MG/.5ML
12.5 INJECTION, SOLUTION SUBCUTANEOUS
Qty: 1 | Refills: 0 | Status: ACTIVE | COMMUNITY
Start: 2025-02-10 | End: 1900-01-01

## 2025-02-11 LAB
GLUCOSE BLDC GLUCOMTR-MCNC: 193
HBA1C MFR BLD HPLC: 7.5

## 2025-03-31 RX ORDER — TIRZEPATIDE 15 MG/.5ML
15 INJECTION, SOLUTION SUBCUTANEOUS
Qty: 1 | Refills: 3 | Status: ACTIVE | COMMUNITY
Start: 2025-03-31 | End: 1900-01-01

## 2025-07-03 RX ORDER — SEMAGLUTIDE 1.34 MG/ML
4 INJECTION, SOLUTION SUBCUTANEOUS
Qty: 3 | Refills: 1 | Status: ACTIVE | COMMUNITY
Start: 2025-07-03 | End: 1900-01-01

## 2025-07-17 ENCOUNTER — RX RENEWAL (OUTPATIENT)
Age: 75
End: 2025-07-17